# Patient Record
Sex: FEMALE | Race: WHITE | NOT HISPANIC OR LATINO | Employment: PART TIME | URBAN - METROPOLITAN AREA
[De-identification: names, ages, dates, MRNs, and addresses within clinical notes are randomized per-mention and may not be internally consistent; named-entity substitution may affect disease eponyms.]

---

## 2023-05-15 ENCOUNTER — APPOINTMENT (OUTPATIENT)
Dept: PRIMARY CARE | Facility: CLINIC | Age: 73
End: 2023-05-15
Payer: MEDICARE

## 2023-08-14 ENCOUNTER — OFFICE VISIT (OUTPATIENT)
Dept: PRIMARY CARE | Facility: CLINIC | Age: 73
End: 2023-08-14
Payer: MEDICARE

## 2023-08-14 ENCOUNTER — LAB (OUTPATIENT)
Dept: LAB | Facility: LAB | Age: 73
End: 2023-08-14
Payer: MEDICARE

## 2023-08-14 VITALS
OXYGEN SATURATION: 99 % | HEIGHT: 67 IN | WEIGHT: 124 LBS | TEMPERATURE: 97.2 F | HEART RATE: 88 BPM | BODY MASS INDEX: 19.46 KG/M2 | DIASTOLIC BLOOD PRESSURE: 81 MMHG | SYSTOLIC BLOOD PRESSURE: 159 MMHG

## 2023-08-14 DIAGNOSIS — R53.83 OTHER FATIGUE: ICD-10-CM

## 2023-08-14 DIAGNOSIS — Z13.31 SCREENING FOR DEPRESSION: ICD-10-CM

## 2023-08-14 DIAGNOSIS — Z13.31 DEPRESSION SCREEN: ICD-10-CM

## 2023-08-14 DIAGNOSIS — Z12.11 COLON CANCER SCREENING: ICD-10-CM

## 2023-08-14 DIAGNOSIS — E55.9 VITAMIN D DEFICIENCY: ICD-10-CM

## 2023-08-14 DIAGNOSIS — M81.0 AGE-RELATED OSTEOPOROSIS WITHOUT CURRENT PATHOLOGICAL FRACTURE: ICD-10-CM

## 2023-08-14 DIAGNOSIS — I10 PRIMARY HYPERTENSION: ICD-10-CM

## 2023-08-14 DIAGNOSIS — Z00.00 WELL ADULT EXAM: Primary | ICD-10-CM

## 2023-08-14 DIAGNOSIS — Z23 NEED FOR VACCINATION: ICD-10-CM

## 2023-08-14 DIAGNOSIS — R87.610 ATYPICAL SQUAMOUS CELLS OF UNDETERMINED SIGNIFICANCE ON CYTOLOGIC SMEAR OF CERVIX (ASC-US): ICD-10-CM

## 2023-08-14 DIAGNOSIS — K21.9 GASTROESOPHAGEAL REFLUX DISEASE WITHOUT ESOPHAGITIS: ICD-10-CM

## 2023-08-14 DIAGNOSIS — Z12.31 SCREENING MAMMOGRAM, ENCOUNTER FOR: ICD-10-CM

## 2023-08-14 DIAGNOSIS — Z90.5 SINGLE KIDNEY: ICD-10-CM

## 2023-08-14 PROBLEM — B94.8 POST-VIRAL DISORDER: Status: ACTIVE | Noted: 2023-08-14

## 2023-08-14 PROBLEM — B97.7: Status: ACTIVE | Noted: 2023-08-14

## 2023-08-14 PROBLEM — N94.9 VAGINAL DISCOMFORT: Status: ACTIVE | Noted: 2023-08-14

## 2023-08-14 PROBLEM — E61.1 LOW IRON: Status: RESOLVED | Noted: 2023-08-14 | Resolved: 2023-08-14

## 2023-08-14 PROBLEM — R87.620 VAGINAL PAP SMEAR WITH ASC-US: Status: ACTIVE | Noted: 2023-08-14

## 2023-08-14 PROBLEM — N95.1 MENOPAUSAL SYMPTOMS: Status: ACTIVE | Noted: 2023-08-14

## 2023-08-14 PROBLEM — E61.1 LOW IRON: Status: ACTIVE | Noted: 2023-08-14

## 2023-08-14 PROBLEM — B94.8 POST-VIRAL DISORDER: Status: RESOLVED | Noted: 2023-08-14 | Resolved: 2023-08-14

## 2023-08-14 LAB
ALANINE AMINOTRANSFERASE (SGPT) (U/L) IN SER/PLAS: 13 U/L (ref 7–45)
ALBUMIN (G/DL) IN SER/PLAS: 4.3 G/DL (ref 3.4–5)
ALKALINE PHOSPHATASE (U/L) IN SER/PLAS: 56 U/L (ref 33–136)
ANION GAP IN SER/PLAS: 14 MMOL/L (ref 10–20)
APPEARANCE, URINE: CLEAR
ASPARTATE AMINOTRANSFERASE (SGOT) (U/L) IN SER/PLAS: 17 U/L (ref 9–39)
BACTERIA, URINE: ABNORMAL /HPF
BASOPHILS (10*3/UL) IN BLOOD BY AUTOMATED COUNT: 0.04 X10E9/L (ref 0–0.1)
BASOPHILS/100 LEUKOCYTES IN BLOOD BY AUTOMATED COUNT: 0.6 % (ref 0–2)
BILIRUBIN TOTAL (MG/DL) IN SER/PLAS: 0.8 MG/DL (ref 0–1.2)
BILIRUBIN, URINE: NEGATIVE
BLOOD, URINE: NEGATIVE
CALCIDIOL (25 OH VITAMIN D3) (NG/ML) IN SER/PLAS: 48 NG/ML
CALCIUM (MG/DL) IN SER/PLAS: 9.6 MG/DL (ref 8.6–10.3)
CARBON DIOXIDE, TOTAL (MMOL/L) IN SER/PLAS: 27 MMOL/L (ref 21–32)
CHLORIDE (MMOL/L) IN SER/PLAS: 101 MMOL/L (ref 98–107)
CHOLESTEROL (MG/DL) IN SER/PLAS: 204 MG/DL (ref 0–199)
CHOLESTEROL IN HDL (MG/DL) IN SER/PLAS: 66 MG/DL
CHOLESTEROL/HDL RATIO: 3.1
COBALAMIN (VITAMIN B12) (PG/ML) IN SER/PLAS: 251 PG/ML (ref 211–911)
COLOR, URINE: ABNORMAL
CREATININE (MG/DL) IN SER/PLAS: 0.74 MG/DL (ref 0.5–1.05)
EOSINOPHILS (10*3/UL) IN BLOOD BY AUTOMATED COUNT: 0.32 X10E9/L (ref 0–0.4)
EOSINOPHILS/100 LEUKOCYTES IN BLOOD BY AUTOMATED COUNT: 4.7 % (ref 0–6)
ERYTHROCYTE DISTRIBUTION WIDTH (RATIO) BY AUTOMATED COUNT: 12.8 % (ref 11.5–14.5)
ERYTHROCYTE MEAN CORPUSCULAR HEMOGLOBIN CONCENTRATION (G/DL) BY AUTOMATED: 33.1 G/DL (ref 32–36)
ERYTHROCYTE MEAN CORPUSCULAR VOLUME (FL) BY AUTOMATED COUNT: 93 FL (ref 80–100)
ERYTHROCYTES (10*6/UL) IN BLOOD BY AUTOMATED COUNT: 4.23 X10E12/L (ref 4–5.2)
GFR FEMALE: 86 ML/MIN/1.73M2
GLUCOSE (MG/DL) IN SER/PLAS: 95 MG/DL (ref 74–99)
GLUCOSE, URINE: NEGATIVE MG/DL
HEMATOCRIT (%) IN BLOOD BY AUTOMATED COUNT: 39.3 % (ref 36–46)
HEMOGLOBIN (G/DL) IN BLOOD: 13 G/DL (ref 12–16)
IMMATURE GRANULOCYTES/100 LEUKOCYTES IN BLOOD BY AUTOMATED COUNT: 0.1 % (ref 0–0.9)
KETONES, URINE: NEGATIVE MG/DL
LDL: 117 MG/DL (ref 0–99)
LEUKOCYTE ESTERASE, URINE: ABNORMAL
LEUKOCYTES (10*3/UL) IN BLOOD BY AUTOMATED COUNT: 6.8 X10E9/L (ref 4.4–11.3)
LYMPHOCYTES (10*3/UL) IN BLOOD BY AUTOMATED COUNT: 1.79 X10E9/L (ref 0.8–3)
LYMPHOCYTES/100 LEUKOCYTES IN BLOOD BY AUTOMATED COUNT: 26.5 % (ref 13–44)
MAGNESIUM (MG/DL) IN SER/PLAS: 1.86 MG/DL (ref 1.6–2.4)
MONOCYTES (10*3/UL) IN BLOOD BY AUTOMATED COUNT: 0.55 X10E9/L (ref 0.05–0.8)
MONOCYTES/100 LEUKOCYTES IN BLOOD BY AUTOMATED COUNT: 8.1 % (ref 2–10)
NEUTROPHILS (10*3/UL) IN BLOOD BY AUTOMATED COUNT: 4.05 X10E9/L (ref 1.6–5.5)
NEUTROPHILS/100 LEUKOCYTES IN BLOOD BY AUTOMATED COUNT: 60 % (ref 40–80)
NITRITE, URINE: NEGATIVE
PH, URINE: 7 (ref 5–8)
PLATELETS (10*3/UL) IN BLOOD AUTOMATED COUNT: 228 X10E9/L (ref 150–450)
POTASSIUM (MMOL/L) IN SER/PLAS: 3.6 MMOL/L (ref 3.5–5.3)
PROTEIN TOTAL: 7.4 G/DL (ref 6.4–8.2)
PROTEIN, URINE: NEGATIVE MG/DL
RBC, URINE: ABNORMAL /HPF (ref 0–5)
SODIUM (MMOL/L) IN SER/PLAS: 138 MMOL/L (ref 136–145)
SPECIFIC GRAVITY, URINE: 1 (ref 1–1.03)
SQUAMOUS EPITHELIAL CELLS, URINE: <1 /HPF
THYROTROPIN (MIU/L) IN SER/PLAS BY DETECTION LIMIT <= 0.05 MIU/L: 3.25 MIU/L (ref 0.44–3.98)
TRIGLYCERIDE (MG/DL) IN SER/PLAS: 105 MG/DL (ref 0–149)
UREA NITROGEN (MG/DL) IN SER/PLAS: 10 MG/DL (ref 6–23)
UROBILINOGEN, URINE: <2 MG/DL (ref 0–1.9)
VLDL: 21 MG/DL (ref 0–40)
WBC, URINE: 6 /HPF (ref 0–5)

## 2023-08-14 PROCEDURE — G0446 INTENS BEHAVE THER CARDIO DX: HCPCS | Performed by: INTERNAL MEDICINE

## 2023-08-14 PROCEDURE — 1159F MED LIST DOCD IN RCRD: CPT | Performed by: INTERNAL MEDICINE

## 2023-08-14 PROCEDURE — G0439 PPPS, SUBSEQ VISIT: HCPCS | Performed by: INTERNAL MEDICINE

## 2023-08-14 PROCEDURE — 36415 COLL VENOUS BLD VENIPUNCTURE: CPT

## 2023-08-14 PROCEDURE — 80061 LIPID PANEL: CPT

## 2023-08-14 PROCEDURE — 3077F SYST BP >= 140 MM HG: CPT | Performed by: INTERNAL MEDICINE

## 2023-08-14 PROCEDURE — 90471 IMMUNIZATION ADMIN: CPT | Performed by: INTERNAL MEDICINE

## 2023-08-14 PROCEDURE — 1036F TOBACCO NON-USER: CPT | Performed by: INTERNAL MEDICINE

## 2023-08-14 PROCEDURE — 81001 URINALYSIS AUTO W/SCOPE: CPT

## 2023-08-14 PROCEDURE — 99214 OFFICE O/P EST MOD 30 MIN: CPT | Performed by: INTERNAL MEDICINE

## 2023-08-14 PROCEDURE — 1160F RVW MEDS BY RX/DR IN RCRD: CPT | Performed by: INTERNAL MEDICINE

## 2023-08-14 PROCEDURE — 1126F AMNT PAIN NOTED NONE PRSNT: CPT | Performed by: INTERNAL MEDICINE

## 2023-08-14 PROCEDURE — G0444 DEPRESSION SCREEN ANNUAL: HCPCS | Performed by: INTERNAL MEDICINE

## 2023-08-14 PROCEDURE — 90715 TDAP VACCINE 7 YRS/> IM: CPT | Performed by: INTERNAL MEDICINE

## 2023-08-14 PROCEDURE — 80053 COMPREHEN METABOLIC PANEL: CPT

## 2023-08-14 PROCEDURE — 93000 ELECTROCARDIOGRAM COMPLETE: CPT | Performed by: INTERNAL MEDICINE

## 2023-08-14 PROCEDURE — 82607 VITAMIN B-12: CPT

## 2023-08-14 PROCEDURE — 84443 ASSAY THYROID STIM HORMONE: CPT

## 2023-08-14 PROCEDURE — 83735 ASSAY OF MAGNESIUM: CPT

## 2023-08-14 PROCEDURE — 85025 COMPLETE CBC W/AUTO DIFF WBC: CPT

## 2023-08-14 PROCEDURE — 3079F DIAST BP 80-89 MM HG: CPT | Performed by: INTERNAL MEDICINE

## 2023-08-14 PROCEDURE — 82306 VITAMIN D 25 HYDROXY: CPT

## 2023-08-14 RX ORDER — CHOLECALCIFEROL (VITAMIN D3) 125 MCG
125 CAPSULE ORAL EVERY OTHER DAY
COMMUNITY
Start: 2022-02-02

## 2023-08-14 RX ORDER — ASCORBIC ACID 250 MG
250 TABLET ORAL DAILY
COMMUNITY

## 2023-08-14 RX ORDER — BUTYROSPERMUM PARKII(SHEA BUTTER), SIMMONDSIA CHINENSIS (JOJOBA) SEED OIL, ALOE BARBADENSIS LEAF EXTRACT .01; 1; 3.5 G/100G; G/100G; G/100G
250 LIQUID TOPICAL 2 TIMES DAILY
COMMUNITY
End: 2024-02-13 | Stop reason: ALTCHOICE

## 2023-08-14 RX ORDER — LISINOPRIL 2.5 MG/1
2.5 TABLET ORAL DAILY
COMMUNITY
End: 2023-08-14 | Stop reason: DRUGHIGH

## 2023-08-14 RX ORDER — LISINOPRIL 5 MG/1
5 TABLET ORAL DAILY
Qty: 30 TABLET | Refills: 5 | Status: SHIPPED | OUTPATIENT
Start: 2023-08-14 | End: 2023-09-08

## 2023-08-14 RX ORDER — LYSINE HCL 500 MG
TABLET ORAL
COMMUNITY
Start: 2018-05-04

## 2023-08-14 ASSESSMENT — PATIENT HEALTH QUESTIONNAIRE - PHQ9
SUM OF ALL RESPONSES TO PHQ9 QUESTIONS 1 AND 2: 0
1. LITTLE INTEREST OR PLEASURE IN DOING THINGS: NOT AT ALL
2. FEELING DOWN, DEPRESSED OR HOPELESS: NOT AT ALL

## 2023-08-14 NOTE — PROGRESS NOTES
Chief Complaint:   Medicare Wellness Exam/Comprehensive Problem Focused Follow Up and Physical Exam    HPI:  72 y/o F with essential HTN, GERD, osteoporosis here for Medicare Wellness. In 2021 she had a Pap smear revealing ASCUS and HPV (not type 16 or 18) and saw OB/GYN for colposcopy. Would like to return to OB/GYN for monitoring of this. Reports intermittent GERD sx for which she takes dahiana but would like another OTC recommendation. On Calcium and Vit D for osteoporosis- declined bisphosphonate at last visit.    Home BP readings: average 130s/70s-80s    Dahiana helps reflux    Social: lives with , works at a [employer] service in [place], drinks 1 glass of wine per day, former social smoking (quit 1990 or 1992) for approx 20 yrs    Exercise: corine chi once per week, daily weight lifting, sit ups and other home exercises    Diet: primary plant-based, occasional dairy    Patient Care Team:  Hailey Stern MD as PCP - General  Hailey Stern MD as PCP - [facility] [place]   Active Problem List  Patient Active Problem List   Diagnosis    Single kidney    Menopausal symptoms    Age-related osteoporosis without current pathological fracture    Essential hypertension    Vaginal discomfort    Vitamin D deficiency    Vaginal Pap smear with ASC-US    Allergic rhinitis    Bicornuate uterus    Endometriosis    Esophageal reflux    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     Comprehensive Medical/Surgical/Social/Family History  Past Medical History:   Diagnosis Date    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 08/14/2023    Hypo-osmolality and hyponatremia 02/02/2022    Low sodium levels    Iron deficiency 01/31/2022    Low iron    Low iron 08/14/2023    Other conditions influencing health status     Patient denies significant medical history    Personal history of other endocrine, nutritional and metabolic disease 01/31/2022    History of hypokalemia    Personal history of other medical  "treatment     H/O bone density study    Personal history of other medical treatment     H/O chest x-ray    Personal history of other specified conditions 05/04/2018    History of fatigue    Post-viral disorder 08/14/2023     Past Surgical History:   Procedure Laterality Date    BREAST LUMPECTOMY  05/04/2018    Breast Surgery Lumpectomy    OTHER SURGICAL HISTORY  05/04/2018    Hysteroscopy     Social History     Social History Narrative    Not on file     Tobacco/Alcohol/Opioid use, as well as Illicit Drug Use was screened for/reviewed and documented in Social Documentation section of the chart and medication list as appropriate    Allergies and Medications  Cat dander, Nickel, and Other  Current Outpatient Medications   Medication Instructions    ascorbic acid (VITAMIN C) 250 mg, oral, Daily    calcium carbonate-vit D3-min 600 mg calcium- 400 unit tablet oral    cholecalciferol (VITAMIN D-3) 125 mcg, oral, Every other day    HAWTHORN NATHAN ORAL oral    lisinopril 5 mg, oral, Daily    saccharomyces boulardii (FLORASTOR) 250 mg, oral, 2 times daily     Medications and Supplements  prescribed by me and other practitioners or clinical pharmacist (such as prescriptions, OTC's, herbal therapies and supplements) were reviewed and documented in the medical record.      Activities of Daily Living  In your present state of health, do you have any difficulty performing the following activities?:   Preparing food and eating?: No  Bathing yourself: No  Getting dressed: No  Using the toilet:No  Moving around from place to place: No  In the past year have you fallen or had a near fall?:No  Able to manage finances independently: Yes  Able to perform grocery shopping: Yes  Able to manage medications independently: Yes  Able to do housework independently: Yes  Patient self-assessment of health status? Excellent    Depression Screen  (Note: if answer to either of the following is \"Yes\", then a more complete depression screening is " indicated)   Q1: Over the past two weeks, have you felt down, depressed or hopeless? No  Q2: Over the past two weeks, have you felt little interest or pleasure in doing things? No    Current exercise habits: [employer] Chi, home exercises   Dietary issues discussed: Yes  Hearing difficulties: No  Safe in current home environment: Yes  Visual Acuity assessed: No  Cognitive Impairment No    Advance directives  Advanced Care Planning (including a Living Will, Healthcare POA, as well as specific end of life choices and/or directives), was discussed with the patient and/or surrogate, voluntarily, and documented in the Problem List of the medical record.      Cardiac Risk Assessment  Cardiovascular risk was discussed and, if needed, lifestyle modifications recommended, including nutritional choices, exercise, and elimination of habits contributing to risk. We agreed on a plan to reduce the current cardiovascular risk based on above discussion as needed.  Aspirin use/disuse was discussed and documented in the Problem List of the medical record after reviewing the updated guidelines below:     Consider low dose Aspirin ( mg) use if the benefit for cardiovascular disease prevention outweighs risk for bleeding complications.   In general, low dose ASA should be considered:  In patients WITHOUT prior MI/stroke/PAD (primary prevention):   a. Age <60: Use if 10-year cardiovascular disease risk >20%, with discussion of risks and benefits with patient  b. Age 60-<70: Use if 10-year cardiovascular disease risk >20% and low bleeding (e.g., gastrointenstinal) risk, with discussion of risks and benefits with patient  c. Age >=70: Do not use    In patients WITH prior MI/stroke/PAD (secondary prevention):   Generally use unless extremely high bleeding (e.g., gastrointenstinal) risk, with discussion of risks and benefits with patient    ROS otherwise negative aside from what was mentioned above in HPI.    Gen:  no  fever  HEENT:  no trouble swallowing  CV:  no dyspnea, cyanosis  Lungs:  no shortness of breath  GI:  no constipation, no blood in stool  Vascular:  no edema  Neuro:   no weakness  Skin:  no rash  MS:no joint swelling  Gu:  no urinary complaints  All other systems have been reviewed and are negative for complaint    Vitals  Vitals:    08/14/23 1011   BP: 159/81   Pulse: 88   Temp: 36.2 °C (97.2 °F)   SpO2: 99%     Objective   Physical Exam  General Appearance:  Alert and oriented.  NAD  HEENT:  Tm's normal , throat clear, no erythema  Lungs, CTAB  Skin:  no suspicious lesions,  warm and dry  Head :  Normocephalic  Oral Cavity; Clear mucosa moist  Neck/thyroid:  neck supple, full rom, no cervical lymphadenopathy  no thyromegaly  Heart:  RRR  no murmurs  Abdomen:  Normal , bs present, soft, nontender, not distended, no masses palpated  Extremities:  No clubbing, cyanosis, or edema  Neurologic:  Nonfocal  Psych: alert, normal mood    A/P:  72 y/o F with HTN, osteoporosis, GERD- overall doing well. With regards to her ASCUS and HPV infection- will refer to OB/GYN for monitoring. Patient declines pneumonia and shingles vaccines but accepts Tdap today. Also declines bisphosphonate therapy for her osteoporosis- will continue Vit D and Ca supplementation and repeat DEXA at next wellness visit. Also increase lisinopril to 5 mg due to elevated BP here and at home. Rest of plan as below:    Iona was seen today for medicare annual wellness visit subsequent.  Diagnoses and all orders for this visit:  Well adult exam (Primary)  Vitamin D deficiency  -     Vitamin D, Total; Future  -     Vitamin B12; Future  -     TSH with reflex to Free T4 if abnormal; Future  -     Comprehensive Metabolic Panel; Future  -     CBC and Auto Differential; Future  -     Magnesium; Future  -     Lipid Panel; Future  Age-related osteoporosis without current pathological fracture  -     Vitamin D, Total; Future  -     Vitamin B12; Future  -     TSH  with reflex to Free T4 if abnormal; Future  -     Comprehensive Metabolic Panel; Future  -     CBC and Auto Differential; Future  -     Magnesium; Future  -     Lipid Panel; Future  Single kidney  -     Vitamin D, Total; Future  -     Vitamin B12; Future  -     TSH with reflex to Free T4 if abnormal; Future  -     Comprehensive Metabolic Panel; Future  -     CBC and Auto Differential; Future  -     Urinalysis with Reflex Microscopic; Future  -     Magnesium; Future  -     Lipid Panel; Future  Other fatigue  -     TSH with reflex to Free T4 if abnormal; Future  -     Magnesium; Future  -     Lipid Panel; Future  Depression screen  -     Magnesium; Future  -     Lipid Panel; Future  Screening mammogram, encounter for  -     BI mammo bilateral screening tomosynthesis; Future  -     Magnesium; Future  -     Lipid Panel; Future  Colon cancer screening  -     Cologuard® colon cancer screening; Future  -     Cologuard® colon cancer screening  -     Magnesium; Future  -     Lipid Panel; Future  Primary hypertension  -     lisinopril 5 mg tablet; Take 1 tablet (5 mg) by mouth once daily.  -     ECG 12 lead  -     CT cardiac scoring wo IV contrast; Future  -     Magnesium; Future  -     Lipid Panel; Future  Atypical squamous cells of undetermined significance on cytologic smear of cervix (ASC-US)  -     Referral to Gynecology; Future  -     Magnesium; Future  -     Lipid Panel; Future  Need for vaccination  -     Tdap vaccine, age 10 years and older (BOOSTRIX)  -     Magnesium; Future  -     Lipid Panel; Future  Screening for depression  Gastroesophageal reflux disease without esophagitis  Comments:  use prilosec for 2 weeks otc then prn, fu if not better       During the course of the visit the patient was educated and counseled about age appropriate screening and preventive services. Completed preventive screenings were documented in the chart and orders were placed for outstanding screenings/procedures as documented in the  Assessment and Plan.    Patient Instructions (the written plan) was given to the patient at check out.    Iona was seen today for medicare annual wellness visit subsequent.  Diagnoses and all orders for this visit:  Well adult exam (Primary)  Vitamin D deficiency  -     Vitamin D, Total; Future  -     Vitamin B12; Future  -     TSH with reflex to Free T4 if abnormal; Future  -     Comprehensive Metabolic Panel; Future  -     CBC and Auto Differential; Future  -     Magnesium; Future  -     Lipid Panel; Future  Age-related osteoporosis without current pathological fracture  -     Vitamin D, Total; Future  -     Vitamin B12; Future  -     TSH with reflex to Free T4 if abnormal; Future  -     Comprehensive Metabolic Panel; Future  -     CBC and Auto Differential; Future  -     Magnesium; Future  -     Lipid Panel; Future  Single kidney  -     Vitamin D, Total; Future  -     Vitamin B12; Future  -     TSH with reflex to Free T4 if abnormal; Future  -     Comprehensive Metabolic Panel; Future  -     CBC and Auto Differential; Future  -     Urinalysis with Reflex Microscopic; Future  -     Magnesium; Future  -     Lipid Panel; Future  Other fatigue  -     TSH with reflex to Free T4 if abnormal; Future  -     Magnesium; Future  -     Lipid Panel; Future  Depression screen  -     Magnesium; Future  -     Lipid Panel; Future  Screening mammogram, encounter for  -     BI mammo bilateral screening tomosynthesis; Future  -     Magnesium; Future  -     Lipid Panel; Future  Colon cancer screening  -     Cologuard® colon cancer screening; Future  -     Cologuard® colon cancer screening  -     Magnesium; Future  -     Lipid Panel; Future  Primary hypertension  -     lisinopril 5 mg tablet; Take 1 tablet (5 mg) by mouth once daily.  -     ECG 12 lead  -     CT cardiac scoring wo IV contrast; Future  -     Magnesium; Future  -     Lipid Panel; Future  Atypical squamous cells of undetermined significance on cytologic smear of  cervix (ASC-US)  -     Referral to Gynecology; Future  -     Magnesium; Future  -     Lipid Panel; Future  Need for vaccination  -     Tdap vaccine, age 10 years and older (BOOSTRIX)  -     Magnesium; Future  -     Lipid Panel; Future  Screening for depression  Gastroesophageal reflux disease without esophagitis  Comments:  use prilosec for 2 weeks otc then prn, fu if not better    I have reviewed the residents h and p.  I agree and have edited any necessary changes.     Chronic conditions reviewed in the assessment and plan.    Continue medications unless specified otherwise.  Previous labs reviewed.      Annual Wellness exam completed   Preventive Health history reviewed:  Vaccines today: none  Labs ordered    Depression Screening done  Advanced Directives Discussion Completed  Cardiovascular risk discussed and if needed, lifestyle modifications recommended, including nutritional choices, exercise, and elimination of habits contributing to risk.  We agreed on a plan to reduce the current cardiovascular risk.  See ecalc ASCVD Risk  Plus for data discussed regarding risk and risk reduction opportunities.  Aspirin use/disuse was discussed after reviewing the updated guidelines.

## 2023-09-08 DIAGNOSIS — I10 PRIMARY HYPERTENSION: ICD-10-CM

## 2023-09-08 RX ORDER — LISINOPRIL 5 MG/1
5 TABLET ORAL DAILY
Qty: 30 TABLET | Refills: 11 | Status: SHIPPED | OUTPATIENT
Start: 2023-09-08

## 2023-10-20 NOTE — PROGRESS NOTES
Iona Hilton is a 73 y.o. G     HPI:    Last Gyn Visit:  12/9/2021 colposcopy   atrophy but otherwise nml; no Bx done  Last pap: 11/3/2021 ASCUS +HR other  Mammogram: 5/10/2021  Colonoscopy:    See above history with first abnormal Pap  Patient has a lifelong history of normal Paps  She has been in a monogamous relationship for the last 30 years  No postmenopausal bleeding  Colposcopy in follow-up was unremarkable patient is here today to  to repeat Pap  Dr. Baker had recommended local estrogen but unfortunately the patient did not use any  She does have problems with vaginal atrophy    CONSTITUTIONAL: Alert and in no acute distress. Well developed, well nourished.   HEAD AND FACE: Head and face: Normal.    EYES: Normal external exam - nonicteric sclera, extraocular movements intact (EOMI) and no ptosis.   EARS, NOSE, MOUTH, AND THROAT: External inspection of ears and nose: Normal.    NECK: No neck asymmetry. Supple.   PULMONARY: No respiratory distress.   MUSCULOSKELETAL: No joint swelling seen, normal movements of all extremities.   SKIN: Normal skin color and pigmentation, normal skin turgor, and no rash.   NEUROLOGIC: Non-focal. Grossly intact.   PSYCHIATRIC: Alert and oriented x 3. Affect normal to patient baseline. Mood: Appropriate.      Assessment and Plan:   Pap likely secondary to vaginal atrophy and not HR HPV   I offered to repeat her Pap today but think it would be more prudent for her to take estrogen for at least 3 months and then return for repeat Pap  Patient agrees and will start Vagifem as directed; return to the office in 3 to 4 months for repeat Pap

## 2023-10-24 ENCOUNTER — OFFICE VISIT (OUTPATIENT)
Dept: OBSTETRICS AND GYNECOLOGY | Facility: CLINIC | Age: 73
End: 2023-10-24
Payer: MEDICARE

## 2023-10-24 VITALS
HEIGHT: 67 IN | BODY MASS INDEX: 20.25 KG/M2 | DIASTOLIC BLOOD PRESSURE: 56 MMHG | SYSTOLIC BLOOD PRESSURE: 118 MMHG | WEIGHT: 129 LBS

## 2023-10-24 DIAGNOSIS — N95.2 VAGINAL ATROPHY: ICD-10-CM

## 2023-10-24 PROCEDURE — 1126F AMNT PAIN NOTED NONE PRSNT: CPT | Performed by: OBSTETRICS & GYNECOLOGY

## 2023-10-24 PROCEDURE — 1159F MED LIST DOCD IN RCRD: CPT | Performed by: OBSTETRICS & GYNECOLOGY

## 2023-10-24 PROCEDURE — 3074F SYST BP LT 130 MM HG: CPT | Performed by: OBSTETRICS & GYNECOLOGY

## 2023-10-24 PROCEDURE — 1160F RVW MEDS BY RX/DR IN RCRD: CPT | Performed by: OBSTETRICS & GYNECOLOGY

## 2023-10-24 PROCEDURE — 3078F DIAST BP <80 MM HG: CPT | Performed by: OBSTETRICS & GYNECOLOGY

## 2023-10-24 PROCEDURE — 99213 OFFICE O/P EST LOW 20 MIN: CPT | Performed by: OBSTETRICS & GYNECOLOGY

## 2023-10-24 PROCEDURE — 1036F TOBACCO NON-USER: CPT | Performed by: OBSTETRICS & GYNECOLOGY

## 2023-10-24 RX ORDER — ESTRADIOL 10 UG/1
10 INSERT VAGINAL NIGHTLY
Qty: 24 TABLET | Refills: 3 | Status: SHIPPED | OUTPATIENT
Start: 2023-10-24 | End: 2024-02-13

## 2023-10-24 ASSESSMENT — PAIN SCALES - GENERAL: PAINLEVEL: 0-NO PAIN

## 2023-11-10 LAB — NONINV COLON CA DNA+OCC BLD SCRN STL QL: NEGATIVE

## 2024-02-13 ENCOUNTER — LAB (OUTPATIENT)
Dept: LAB | Facility: LAB | Age: 74
End: 2024-02-13
Payer: MEDICARE

## 2024-02-13 ENCOUNTER — OFFICE VISIT (OUTPATIENT)
Dept: PRIMARY CARE | Facility: CLINIC | Age: 74
End: 2024-02-13
Payer: MEDICARE

## 2024-02-13 ENCOUNTER — HOSPITAL ENCOUNTER (OUTPATIENT)
Dept: RADIOLOGY | Facility: CLINIC | Age: 74
Discharge: HOME | End: 2024-02-13
Payer: MEDICARE

## 2024-02-13 VITALS
BODY MASS INDEX: 20.09 KG/M2 | DIASTOLIC BLOOD PRESSURE: 73 MMHG | OXYGEN SATURATION: 99 % | HEART RATE: 88 BPM | HEIGHT: 66 IN | SYSTOLIC BLOOD PRESSURE: 145 MMHG | WEIGHT: 125 LBS | TEMPERATURE: 97.7 F

## 2024-02-13 DIAGNOSIS — E55.9 VITAMIN D DEFICIENCY: ICD-10-CM

## 2024-02-13 DIAGNOSIS — R93.89 ABNORMAL CHEST X-RAY: Primary | ICD-10-CM

## 2024-02-13 DIAGNOSIS — T78.40XA ALLERGY, INITIAL ENCOUNTER: ICD-10-CM

## 2024-02-13 DIAGNOSIS — R05.9 COUGH, UNSPECIFIED TYPE: ICD-10-CM

## 2024-02-13 DIAGNOSIS — J01.00 SUBACUTE MAXILLARY SINUSITIS: Primary | ICD-10-CM

## 2024-02-13 DIAGNOSIS — J01.00 SUBACUTE MAXILLARY SINUSITIS: ICD-10-CM

## 2024-02-13 DIAGNOSIS — E53.8 VITAMIN B 12 DEFICIENCY: ICD-10-CM

## 2024-02-13 DIAGNOSIS — R93.89 ABNORMAL CHEST X-RAY: ICD-10-CM

## 2024-02-13 DIAGNOSIS — R03.0 WHITE COAT SYNDROME WITHOUT DIAGNOSIS OF HYPERTENSION: ICD-10-CM

## 2024-02-13 LAB
25(OH)D3 SERPL-MCNC: 50 NG/ML (ref 30–100)
BASOPHILS # BLD AUTO: 0.04 X10*3/UL (ref 0–0.1)
BASOPHILS NFR BLD AUTO: 0.6 %
EOSINOPHIL # BLD AUTO: 0.35 X10*3/UL (ref 0–0.4)
EOSINOPHIL NFR BLD AUTO: 5.3 %
ERYTHROCYTE [DISTWIDTH] IN BLOOD BY AUTOMATED COUNT: 13 % (ref 11.5–14.5)
HCT VFR BLD AUTO: 36.6 % (ref 36–46)
HGB BLD-MCNC: 12.1 G/DL (ref 12–16)
IMM GRANULOCYTES # BLD AUTO: 0.02 X10*3/UL (ref 0–0.5)
IMM GRANULOCYTES NFR BLD AUTO: 0.3 % (ref 0–0.9)
LYMPHOCYTES # BLD AUTO: 1.76 X10*3/UL (ref 0.8–3)
LYMPHOCYTES NFR BLD AUTO: 26.8 %
MCH RBC QN AUTO: 30.1 PG (ref 26–34)
MCHC RBC AUTO-ENTMCNC: 33.1 G/DL (ref 32–36)
MCV RBC AUTO: 91 FL (ref 80–100)
MONOCYTES # BLD AUTO: 0.68 X10*3/UL (ref 0.05–0.8)
MONOCYTES NFR BLD AUTO: 10.4 %
NEUTROPHILS # BLD AUTO: 3.71 X10*3/UL (ref 1.6–5.5)
NEUTROPHILS NFR BLD AUTO: 56.6 %
NRBC BLD-RTO: 0 /100 WBCS (ref 0–0)
PLATELET # BLD AUTO: 204 X10*3/UL (ref 150–450)
RBC # BLD AUTO: 4.02 X10*6/UL (ref 4–5.2)
VIT B12 SERPL-MCNC: 359 PG/ML (ref 211–911)
WBC # BLD AUTO: 6.6 X10*3/UL (ref 4.4–11.3)

## 2024-02-13 PROCEDURE — 99214 OFFICE O/P EST MOD 30 MIN: CPT | Performed by: INTERNAL MEDICINE

## 2024-02-13 PROCEDURE — 82785 ASSAY OF IGE: CPT

## 2024-02-13 PROCEDURE — 1126F AMNT PAIN NOTED NONE PRSNT: CPT | Performed by: INTERNAL MEDICINE

## 2024-02-13 PROCEDURE — 82607 VITAMIN B-12: CPT

## 2024-02-13 PROCEDURE — 82565 ASSAY OF CREATININE: CPT

## 2024-02-13 PROCEDURE — 71046 X-RAY EXAM CHEST 2 VIEWS: CPT | Performed by: RADIOLOGY

## 2024-02-13 PROCEDURE — 36415 COLL VENOUS BLD VENIPUNCTURE: CPT

## 2024-02-13 PROCEDURE — 3078F DIAST BP <80 MM HG: CPT | Performed by: INTERNAL MEDICINE

## 2024-02-13 PROCEDURE — 1159F MED LIST DOCD IN RCRD: CPT | Performed by: INTERNAL MEDICINE

## 2024-02-13 PROCEDURE — 1036F TOBACCO NON-USER: CPT | Performed by: INTERNAL MEDICINE

## 2024-02-13 PROCEDURE — 85025 COMPLETE CBC W/AUTO DIFF WBC: CPT

## 2024-02-13 PROCEDURE — 82306 VITAMIN D 25 HYDROXY: CPT

## 2024-02-13 PROCEDURE — 71046 X-RAY EXAM CHEST 2 VIEWS: CPT

## 2024-02-13 PROCEDURE — 3077F SYST BP >= 140 MM HG: CPT | Performed by: INTERNAL MEDICINE

## 2024-02-13 PROCEDURE — 86003 ALLG SPEC IGE CRUDE XTRC EA: CPT

## 2024-02-13 RX ORDER — AZITHROMYCIN 250 MG/1
TABLET, FILM COATED ORAL
Qty: 6 TABLET | Refills: 0 | Status: SHIPPED | OUTPATIENT
Start: 2024-02-13 | End: 2024-02-18

## 2024-02-13 ASSESSMENT — PATIENT HEALTH QUESTIONNAIRE - PHQ9
1. LITTLE INTEREST OR PLEASURE IN DOING THINGS: NOT AT ALL
SUM OF ALL RESPONSES TO PHQ9 QUESTIONS 1 AND 2: 0
2. FEELING DOWN, DEPRESSED OR HOPELESS: NOT AT ALL

## 2024-02-13 NOTE — PROGRESS NOTES
"Subjective   Patient ID: Iona Hilton is a 73 y.o. female who presents for Cough (Was ill in  and has a lingering cough and sometimes has mucous and sometimes is a dry cough/Has had a few episodes of choking while eating).  HPI   flu like sx no testing done  Cough, congestion, no fever    Fatigue until now  Chest overall better  Throat still w phlegm  No fever  Hard to swallow    Review of Systems  Gen:  no fever  HEENT:  no trouble swallowing  CV:  no dyspnea, cyanosis  Lungs:  no shortness of breath  GI:  no constipation, no blood in stool  Vascular:  no edema  Neuro:   no weakness  Skin:  no rash  MS:no joint swelling  Gu:  no urinary complaints  All other systems have been reviewed and are negative for complaint    /73   Pulse 88   Temp 36.5 °C (97.7 °F) (Temporal)   Ht 1.664 m (5' 5.5\")   Wt 56.7 kg (125 lb)   SpO2 99%   BMI 20.48 kg/m²   Objective   Physical Exam  Lab Results   Component Value Date    WBC 6.6 2024    HGB 12.1 2024    HCT 36.6 2024    MCV 91 2024     2024     Lab Results   Component Value Date    GLUCOSE 95 2023    CALCIUM 9.6 2023     2023    K 3.6 2023    CO2 27 2023     2023    BUN 10 2023    CREATININE 0.84 2024     Social History     Socioeconomic History    Marital status:      Spouse name: None    Number of children: None    Years of education: None    Highest education level: None   Occupational History    None   Tobacco Use    Smoking status: Former     Types: Cigarettes     Quit date:      Years since quittin.1    Smokeless tobacco: Never   Vaping Use    Vaping Use: Never used   Substance and Sexual Activity    Alcohol use: Yes     Comment: wine one per day    Drug use: Never    Sexual activity: Yes   Other Topics Concern    None   Social History Narrative    None     Social Determinants of Health     Financial Resource Strain: Not on file   Food " Insecurity: Not on file   Transportation Needs: Not on file   Physical Activity: Not on file   Stress: Not on file   Social Connections: Not on file   Intimate Partner Violence: Not on file   Housing Stability: Not on file     Family History   Problem Relation Name Age of Onset    Other (heart valve replct) Mother  90    Heart attack Father  47    Hypertension Sister         General:  Alert and in  NAD  Heent:  tms nl, throat clear.     Lungs, CTAB  Skin:  no suspicious lesions,  warm and dry  Head :  Normocephalic  Neck/thyroid:  neck supple, full rom, no cervical lymphadenopathy  no thyromegaly  Heart:  RRR  no murmurs  Abdomen:  Normal , bs present, soft, nontender, not distended, no masses palpated  Extremities:  No clubbing, cyanosis, or edema  Neurologic:  Nonfocal  Psych: alert, normal mood      Iona was seen today for cough.  Diagnoses and all orders for this visit:  Subacute maxillary sinusitis (Primary)  -     azithromycin (Zithromax) 250 mg tablet; Take 2 tablets (500 mg) by mouth once daily for 1 day, THEN 1 tablet (250 mg) once daily for 4 days. Take 2 tabs (500 mg) by mouth today, than 1 daily for 4 days..  -     CBC and Auto Differential; Future  -     Food Allergy Profile IgE; Future  -     Respiratory Allergy Profile IgE; Future  -     Vitamin B12; Future  -     Vitamin D 25-Hydroxy,Total (for eval of Vitamin D levels); Future  Cough, unspecified type  -     azithromycin (Zithromax) 250 mg tablet; Take 2 tablets (500 mg) by mouth once daily for 1 day, THEN 1 tablet (250 mg) once daily for 4 days. Take 2 tabs (500 mg) by mouth today, than 1 daily for 4 days..  -     XR chest 2 views; Future  -     CBC and Auto Differential; Future  -     Food Allergy Profile IgE; Future  -     Respiratory Allergy Profile IgE; Future  -     Vitamin B12; Future  -     Vitamin D 25-Hydroxy,Total (for eval of Vitamin D levels); Future  Vitamin B 12 deficiency  -     CBC and Auto Differential; Future  -     Food  Allergy Profile IgE; Future  -     Respiratory Allergy Profile IgE; Future  -     Vitamin B12; Future  -     Vitamin D 25-Hydroxy,Total (for eval of Vitamin D levels); Future  Allergy, initial encounter  -     Food Allergy Profile IgE; Future  -     Respiratory Allergy Profile IgE; Future  Vitamin D deficiency  -     Vitamin D 25-Hydroxy,Total (for eval of Vitamin D levels); Future  White coat syndrome without diagnosis of hypertension  Comments:  check bp at home  fu if numbers above 135/85    Follow up if symptoms do not resolve or worsen.

## 2024-02-14 LAB
A ALTERNATA IGE QN: 0.64 KU/L
A FUMIGATUS IGE QN: <0.1 KU/L
BERMUDA GRASS IGE QN: <0.1 KU/L
BOXELDER IGE QN: 0.1 KU/L
C HERBARUM IGE QN: <0.1 KU/L
CALIF WALNUT POLN IGE QN: 0.17 KU/L
CAT DANDER IGE QN: <0.1 KU/L
CLAM IGE QN: <0.1 KU/L
CMN PIGWEED IGE QN: 0.1 KU/L
CODFISH IGE QN: <0.1 KU/L
COMMON RAGWEED IGE QN: 0.58 KU/L
CORN IGE QN: <0.1
COTTONWOOD IGE QN: 0.18 KU/L
CREAT SERPL-MCNC: 0.84 MG/DL (ref 0.5–1.05)
D FARINAE IGE QN: <0.1 KU/L
D PTERONYSS IGE QN: <0.1 KU/L
DOG DANDER IGE QN: <0.1 KU/L
EGFRCR SERPLBLD CKD-EPI 2021: 74 ML/MIN/1.73M*2
EGG WHITE IGE QN: <0.1 KU/L
ENGL PLANTAIN IGE QN: <0.1 KU/L
GOOSEFOOT IGE QN: <0.1 KU/L
JOHNSON GRASS IGE QN: <0.1 KU/L
KENT BLUE GRASS IGE QN: 0.21 KU/L
LONDON PLANE IGE QN: 0.14 KU/L
MILK IGE QN: 0.13 KU/L
MT JUNIPER IGE QN: 0.15 KU/L
P NOTATUM IGE QN: <0.1 KU/L
PEANUT IGE QN: 0.24 KU/L
PECAN/HICK TREE IGE QN: 0.15 KU/L
ROACH IGE QN: <0.1 KU/L
SALTWORT IGE QN: 0.11 KU/L
SCALLOP IGE QN: <0.1 KU/L
SESAME SEED IGE QN: 0.24 KU/L
SHEEP SORREL IGE QN: <0.1 KU/L
SHRIMP IGE QN: <0.1 KU/L
SILVER BIRCH IGE QN: <0.1 KU/L
SOYBEAN IGE QN: <0.1 KU/L
TIMOTHY IGE QN: 0.24 KU/L
TOTAL IGE SMQN RAST: 153 KU/L
WALNUT IGE QN: 0.11 KU/L
WHEAT IGE QN: 0.16 KU/L
WHITE ASH IGE QN: 0.88 KU/L
WHITE ELM IGE QN: 0.19 KU/L
WHITE MULBERRY IGE QN: <0.1 KU/L
WHITE OAK IGE QN: <0.1 KU/L

## 2024-02-15 ENCOUNTER — TELEPHONE (OUTPATIENT)
Dept: PRIMARY CARE | Facility: CLINIC | Age: 74
End: 2024-02-15
Payer: MEDICARE

## 2024-02-15 NOTE — TELEPHONE ENCOUNTER
Patient states you wanted to know what Vitamin D3 brand with K2  [facility] solution on [employer] or [employer] time  And 5,000 IU

## 2024-02-16 DIAGNOSIS — Z91.018 MULTIPLE FOOD ALLERGIES: ICD-10-CM

## 2024-03-04 ENCOUNTER — HOSPITAL ENCOUNTER (OUTPATIENT)
Dept: RADIOLOGY | Facility: CLINIC | Age: 74
Discharge: HOME | End: 2024-03-04
Payer: MEDICARE

## 2024-03-04 DIAGNOSIS — R93.89 ABNORMAL CHEST X-RAY: ICD-10-CM

## 2024-03-04 PROCEDURE — 71260 CT THORAX DX C+: CPT

## 2024-03-04 PROCEDURE — 71260 CT THORAX DX C+: CPT | Performed by: RADIOLOGY

## 2024-03-04 PROCEDURE — 2550000001 HC RX 255 CONTRASTS: Performed by: INTERNAL MEDICINE

## 2024-03-04 RX ADMIN — IOHEXOL 50 ML: 350 INJECTION, SOLUTION INTRAVENOUS at 10:29

## 2024-03-18 DIAGNOSIS — R91.1 PULMONARY NODULE: ICD-10-CM

## 2024-03-19 ENCOUNTER — CONSULT (OUTPATIENT)
Dept: ALLERGY | Facility: CLINIC | Age: 74
End: 2024-03-19
Payer: MEDICARE

## 2024-03-19 VITALS
TEMPERATURE: 97.8 F | BODY MASS INDEX: 20.09 KG/M2 | RESPIRATION RATE: 17 BRPM | OXYGEN SATURATION: 97 % | SYSTOLIC BLOOD PRESSURE: 120 MMHG | WEIGHT: 125 LBS | HEIGHT: 66 IN | DIASTOLIC BLOOD PRESSURE: 78 MMHG | HEART RATE: 73 BPM

## 2024-03-19 DIAGNOSIS — Z91.018 MULTIPLE FOOD ALLERGIES: ICD-10-CM

## 2024-03-19 PROCEDURE — 99203 OFFICE O/P NEW LOW 30 MIN: CPT | Performed by: ALLERGY & IMMUNOLOGY

## 2024-03-19 NOTE — PATIENT INSTRUCTIONS
No food allergy-continue current diet. No need for EPI PEN.  Mild allergy to Ragweed and Alternaria mold only. Other labs equivocal so may or may not cause rhinits.  Use as needed medication.

## 2024-03-19 NOTE — PROGRESS NOTES
Patient ID: Iona Hilton is a 73 y.o. female.     Chief Complaint: NPV her for allergy evaluataion  History Of Present Illness  Iona Hilton is a 73 y.o. female with PMx allergy symptoms presenting for consultation.   Right after New year, had lingering pnd and cough.  Had CXR/CT chest and labs. Here to review labs results.  The CT of chest showed scattered pulmonary nodules < 3mm, mild atherosclerosis and mild hiatal hernia.      Food Allergy  No.  No history of food reactions and consumes a plant based diet.    Eczema/ Atopic Dermatitis  No    Asthma  No    Rhinoconjunctivitis  Intermittent  Saline daily    Drug Allergy   No    Insect Allergy   No    Infections  No history of frequent or recurrent infections      Review of Systems    Pertinent positives and negatives have been assessed in the HPI. All other systems have been reviewed and are negative except as noted in the HPI.    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    Family History  Family History   Problem Relation Name Age of Onset    Other (heart valve replct) Mother  90    Heart attack Father  47    Hypertension Sister         No history of food allergy or atopic disease in the family.    Surgical History  She has a past surgical history that includes Breast lumpectomy (05/04/2018) and Other surgical history (05/04/2018).    Social/Environmental History  She reports that she quit smoking about 32 years ago. Her smoking use  "included cigarettes. She has never used smokeless tobacco. She reports current alcohol use. She reports that she does not use drugs.    Home: Lives in a house   Floors: Wood and carpeting mixed  Air Conditioning: Central  Smoker: No  Pets: No  Infestations: No  Molds: No  Occupation: mentor/training managers    MEDICATIONS  Current Outpatient Medications on File Prior to Visit   Medication Sig Dispense Refill    ascorbic acid (Vitamin C) 250 mg tablet Take 1 tablet (250 mg) by mouth once daily.      calcium carbonate-vit D3-min 600 mg calcium- 400 unit tablet Take by mouth.      cholecalciferol (Vitamin D-3) 125 MCG (5000 UT) capsule Take 1 capsule (125 mcg) by mouth every other day.      HAWTHORN BERRY ORAL Take by mouth.      lisinopril 5 mg tablet TAKE 1 TABLET BY MOUTH EVERY DAY 30 tablet 11     No current facility-administered medications on file prior to visit.       Physical Exam  Visit Vitals  /78   Pulse 73   Temp 36.6 °C (97.8 °F) (Temporal)   Resp 17   Ht 1.664 m (5' 5.5\")   Wt 56.7 kg (125 lb)   SpO2 97%   BMI 20.48 kg/m²   OB Status Postmenopausal   Smoking Status Former   BSA 1.62 m²       Wt Readings from Last 1 Encounters:   03/19/24 56.7 kg (125 lb)       Physical Exam    General: Well appearing, no acute distress  Head: Normocephalic, atraumatic, neck supple without lymphadenopathy  Eyes: EOMI, non-injected  Ears: Tm's pale  Nose: No nasal crease, nares patent,  minimal discharge  Throat: Normal dentition, no erythema  Heart: Regular rate and rhythm  Lungs: Clear to auscultation bilaterally, effort normal  Abdomen: Soft, non-tender, normal bowel sounds  Extremities: Moves all extremities symmetrically, no edema  Skin: No rashes/lesions  Psych: normal mood and affect    LAB RESULTS:  CBC:  Recent Labs     02/13/24  1017 08/14/23  1134 01/31/22  1047   WBC 6.6 6.8 6.8   HGB 12.1 13.0 12.4   HCT 36.6 39.3 37.4    228 196   MCV 91 93 95   EOSABS 0.35 0.32 0.33       CMP:  Recent Labs     " 02/13/24  1017 08/14/23  1134 01/09/23  0937 02/09/22  1330 01/31/22  1047 01/05/22  1250 05/05/21  1134   NA  --  138  --  139 134*   < > 139   K  --  3.6  --  4.0 4.4   < > 3.7   CL  --  101  --  103 100   < > 104   CO2  --  27  --  28 29   < > 27   ANIONGAP  --  14  --  12 9*   < > 12   BUN  --  10  --  12 11   < > 11   CREATININE 0.84 0.74  --  0.68 0.71   < > 0.67   EGFR 74  --   --   --   --   --   --    MG  --  1.86 1.97  --   --   --  2.00    < > = values in this interval not displayed.     Recent Labs     08/14/23  1134 01/31/22  1047 01/05/22  1250   ALBUMIN 4.3 4.0 4.1   ALKPHOS 56 52 59   ALT 13 11 15   AST 17 14 18   BILITOT 0.8 0.7 0.5       ALLERGY:   Lab Results   Component Value Date    ICIGE 153 02/13/2024    WHITEASH 0.88 (Mod) 02/13/2024    SILVERBIRCH <0.10 02/13/2024    BOXELDER 0.10 (Equiv IgE) 02/13/2024    MOUNTJUNIPER 0.15 (Equiv IgE) 02/13/2024    COTTONWOOD 0.18 (Equiv IgE) 02/13/2024    ELM 0.19 (Equiv IgE) 02/13/2024    MULBERRY <0.10 02/13/2024    PECANHICKORY 0.15 (Equiv IgE) 02/13/2024    MAPLESYCAMOR 0.14 (Equiv IgE) 02/13/2024    OAK <0.10 02/13/2024    BERMUDAGR <0.10 02/13/2024    JOHNSONGR <0.10 02/13/2024    BLUEGRASS 0.21 (Equiv IgE) 02/13/2024    TIMOTHYGRASS 0.24 (Equiv IgE) 02/13/2024     Lab Results   Component Value Date    LAMBQUART <0.10 02/13/2024    PIGWEED 0.10 (Equiv IgE) 02/13/2024    COMRAGWEED 0.58 (Low) 02/13/2024    RUSSIANT 0.11 (Equiv IgE) 02/13/2024    SHEEPSOR <0.10 02/13/2024    PLANTAIN <0.10 02/13/2024    CATEPI <0.10 02/13/2024    DOGEPI <0.10 02/13/2024    ALTERNA 0.64 (Low) 02/13/2024    CLADHERB <0.10 02/13/2024    ICA04 <0.10 02/13/2024    PENICILLIUM <0.10 02/13/2024    DERMFAR <0.10 02/13/2024    DERMPTE <0.10 02/13/2024    COCKR <0.10 02/13/2024     Lab Results   Component Value Date    PEANUT 0.24 (Equiv IgE) 02/13/2024    WALNUT 0.11 (Equiv IgE) 02/13/2024    WALNUT 0.17 (Equiv IgE) 02/13/2024    SHRIMP <0.10 02/13/2024    CLAM <0.10 02/13/2024     SCALLOP <0.10 02/13/2024       Recent Labs     02/13/24  1017   ICIGE 153     Recent Labs     02/13/24  1017 08/14/23  1134 01/31/22  1047   EOSABS 0.35 0.32 0.33       HEME/ENDO:  Recent Labs     08/14/23  1134 01/31/22  1047 08/24/21  1738 04/08/21  1515   TSH 3.25 2.19  --  2.11   FERRITIN  --  72 75 98   IRONSAT  --  42 21* 10*         Assessment/Plan   Assessment:  Low positive immunoCAP for rosalio tree and Alternaria mold reviewed. Discussed avoidance and medications.  Remainder of immunoCAP for food and environmental triggers are negative or equivocal and not considered significant.  Cough has now resolved s/p illness.  Small nodules on lung scan.  Plan:  Rosalio tree pollen and Alternaria avoidance with as needed medication  Resume regular diet without concern for food allergy  Continued general monitoring based on risk factors for pulmonary nodules        Carlene Cano DO

## 2024-05-28 ENCOUNTER — APPOINTMENT (OUTPATIENT)
Dept: ALLERGY | Facility: CLINIC | Age: 74
End: 2024-05-28
Payer: MEDICARE

## 2024-08-19 ENCOUNTER — APPOINTMENT (OUTPATIENT)
Dept: PRIMARY CARE | Facility: CLINIC | Age: 74
End: 2024-08-19
Payer: MEDICARE

## 2024-08-29 DIAGNOSIS — Z12.31 ENCOUNTER FOR SCREENING MAMMOGRAM FOR BREAST CANCER: ICD-10-CM

## 2024-09-12 DIAGNOSIS — I10 PRIMARY HYPERTENSION: ICD-10-CM

## 2024-09-12 RX ORDER — LISINOPRIL 5 MG/1
5 TABLET ORAL DAILY
Qty: 90 TABLET | Refills: 1 | Status: SHIPPED | OUTPATIENT
Start: 2024-09-12

## 2024-10-22 ENCOUNTER — APPOINTMENT (OUTPATIENT)
Dept: CARDIOLOGY | Facility: HOSPITAL | Age: 74
DRG: 522 | End: 2024-10-22
Payer: MEDICARE

## 2024-10-22 ENCOUNTER — APPOINTMENT (OUTPATIENT)
Dept: RADIOLOGY | Facility: HOSPITAL | Age: 74
DRG: 522 | End: 2024-10-22
Payer: MEDICARE

## 2024-10-22 ENCOUNTER — HOSPITAL ENCOUNTER (INPATIENT)
Facility: HOSPITAL | Age: 74
DRG: 522 | End: 2024-10-22
Attending: EMERGENCY MEDICINE | Admitting: STUDENT IN AN ORGANIZED HEALTH CARE EDUCATION/TRAINING PROGRAM
Payer: MEDICARE

## 2024-10-22 DIAGNOSIS — S72.001A CLOSED FRACTURE OF NECK OF RIGHT FEMUR, INITIAL ENCOUNTER: Primary | ICD-10-CM

## 2024-10-22 DIAGNOSIS — S72.001A CLOSED DISPLACED FRACTURE OF RIGHT FEMORAL NECK: ICD-10-CM

## 2024-10-22 LAB
ALBUMIN SERPL BCP-MCNC: 3.9 G/DL (ref 3.4–5)
ALP SERPL-CCNC: 48 U/L (ref 33–136)
ALT SERPL W P-5'-P-CCNC: 13 U/L (ref 7–45)
ANION GAP SERPL CALC-SCNC: 13 MMOL/L (ref 10–20)
AST SERPL W P-5'-P-CCNC: 13 U/L (ref 9–39)
BASOPHILS # BLD AUTO: 0.02 X10*3/UL (ref 0–0.1)
BASOPHILS NFR BLD AUTO: 0.2 %
BILIRUB SERPL-MCNC: 0.3 MG/DL (ref 0–1.2)
BUN SERPL-MCNC: 18 MG/DL (ref 6–23)
CALCIUM SERPL-MCNC: 9 MG/DL (ref 8.6–10.3)
CHLORIDE SERPL-SCNC: 103 MMOL/L (ref 98–107)
CO2 SERPL-SCNC: 25 MMOL/L (ref 21–32)
CREAT SERPL-MCNC: 0.76 MG/DL (ref 0.5–1.05)
EGFRCR SERPLBLD CKD-EPI 2021: 83 ML/MIN/1.73M*2
EOSINOPHIL # BLD AUTO: 0.21 X10*3/UL (ref 0–0.4)
EOSINOPHIL NFR BLD AUTO: 1.7 %
ERYTHROCYTE [DISTWIDTH] IN BLOOD BY AUTOMATED COUNT: 12.9 % (ref 11.5–14.5)
GLUCOSE SERPL-MCNC: 141 MG/DL (ref 74–99)
HCT VFR BLD AUTO: 33 % (ref 36–46)
HGB BLD-MCNC: 11.3 G/DL (ref 12–16)
IMM GRANULOCYTES # BLD AUTO: 0.04 X10*3/UL (ref 0–0.5)
IMM GRANULOCYTES NFR BLD AUTO: 0.3 % (ref 0–0.9)
LYMPHOCYTES # BLD AUTO: 1.59 X10*3/UL (ref 0.8–3)
LYMPHOCYTES NFR BLD AUTO: 13.2 %
MCH RBC QN AUTO: 31.4 PG (ref 26–34)
MCHC RBC AUTO-ENTMCNC: 34.2 G/DL (ref 32–36)
MCV RBC AUTO: 92 FL (ref 80–100)
MONOCYTES # BLD AUTO: 1 X10*3/UL (ref 0.05–0.8)
MONOCYTES NFR BLD AUTO: 8.3 %
NEUTROPHILS # BLD AUTO: 9.17 X10*3/UL (ref 1.6–5.5)
NEUTROPHILS NFR BLD AUTO: 76.3 %
NRBC BLD-RTO: 0 /100 WBCS (ref 0–0)
PLATELET # BLD AUTO: 205 X10*3/UL (ref 150–450)
POTASSIUM SERPL-SCNC: 3.9 MMOL/L (ref 3.5–5.3)
PROT SERPL-MCNC: 6.5 G/DL (ref 6.4–8.2)
RBC # BLD AUTO: 3.6 X10*6/UL (ref 4–5.2)
SODIUM SERPL-SCNC: 137 MMOL/L (ref 136–145)
WBC # BLD AUTO: 12 X10*3/UL (ref 4.4–11.3)

## 2024-10-22 PROCEDURE — 85025 COMPLETE CBC W/AUTO DIFF WBC: CPT | Performed by: EMERGENCY MEDICINE

## 2024-10-22 PROCEDURE — 71045 X-RAY EXAM CHEST 1 VIEW: CPT

## 2024-10-22 PROCEDURE — 93005 ELECTROCARDIOGRAM TRACING: CPT

## 2024-10-22 PROCEDURE — 99285 EMERGENCY DEPT VISIT HI MDM: CPT

## 2024-10-22 PROCEDURE — 73502 X-RAY EXAM HIP UNI 2-3 VIEWS: CPT | Mod: RT

## 2024-10-22 PROCEDURE — 73700 CT LOWER EXTREMITY W/O DYE: CPT | Mod: RT

## 2024-10-22 PROCEDURE — 73502 X-RAY EXAM HIP UNI 2-3 VIEWS: CPT | Mod: RIGHT SIDE | Performed by: RADIOLOGY

## 2024-10-22 PROCEDURE — 36415 COLL VENOUS BLD VENIPUNCTURE: CPT | Performed by: EMERGENCY MEDICINE

## 2024-10-22 PROCEDURE — 80053 COMPREHEN METABOLIC PANEL: CPT | Performed by: EMERGENCY MEDICINE

## 2024-10-22 PROCEDURE — 99285 EMERGENCY DEPT VISIT HI MDM: CPT | Performed by: EMERGENCY MEDICINE

## 2024-10-22 ASSESSMENT — LIFESTYLE VARIABLES
EVER FELT BAD OR GUILTY ABOUT YOUR DRINKING: NO
HAVE PEOPLE ANNOYED YOU BY CRITICIZING YOUR DRINKING: NO
EVER HAD A DRINK FIRST THING IN THE MORNING TO STEADY YOUR NERVES TO GET RID OF A HANGOVER: NO
TOTAL SCORE: 0
HAVE YOU EVER FELT YOU SHOULD CUT DOWN ON YOUR DRINKING: NO

## 2024-10-22 ASSESSMENT — COLUMBIA-SUICIDE SEVERITY RATING SCALE - C-SSRS
2. HAVE YOU ACTUALLY HAD ANY THOUGHTS OF KILLING YOURSELF?: NO
1. IN THE PAST MONTH, HAVE YOU WISHED YOU WERE DEAD OR WISHED YOU COULD GO TO SLEEP AND NOT WAKE UP?: NO
6. HAVE YOU EVER DONE ANYTHING, STARTED TO DO ANYTHING, OR PREPARED TO DO ANYTHING TO END YOUR LIFE?: NO

## 2024-10-22 ASSESSMENT — PAIN - FUNCTIONAL ASSESSMENT: PAIN_FUNCTIONAL_ASSESSMENT: 0-10

## 2024-10-22 ASSESSMENT — PAIN SCALES - GENERAL: PAINLEVEL_OUTOF10: 6

## 2024-10-22 ASSESSMENT — PAIN DESCRIPTION - ORIENTATION: ORIENTATION: RIGHT

## 2024-10-22 ASSESSMENT — PAIN DESCRIPTION - LOCATION: LOCATION: HIP

## 2024-10-22 ASSESSMENT — PAIN DESCRIPTION - DESCRIPTORS: DESCRIPTORS: THROBBING

## 2024-10-23 ENCOUNTER — ANESTHESIA EVENT (OUTPATIENT)
Dept: OPERATING ROOM | Facility: HOSPITAL | Age: 74
End: 2024-10-23
Payer: MEDICARE

## 2024-10-23 ENCOUNTER — APPOINTMENT (OUTPATIENT)
Dept: RADIOLOGY | Facility: HOSPITAL | Age: 74
DRG: 522 | End: 2024-10-23
Payer: MEDICARE

## 2024-10-23 ENCOUNTER — ANESTHESIA (OUTPATIENT)
Dept: OPERATING ROOM | Facility: HOSPITAL | Age: 74
End: 2024-10-23
Payer: MEDICARE

## 2024-10-23 PROBLEM — N30.00 ACUTE CYSTITIS WITHOUT HEMATURIA: Status: ACTIVE | Noted: 2024-10-23

## 2024-10-23 PROBLEM — S72.001A CLOSED FRACTURE OF NECK OF RIGHT FEMUR: Status: RESOLVED | Noted: 2024-10-22 | Resolved: 2024-10-23

## 2024-10-23 PROBLEM — D72.828 NEUTROPHILIC LEUKOCYTOSIS: Status: ACTIVE | Noted: 2024-10-23

## 2024-10-23 PROBLEM — S72.001A CLOSED FRACTURE OF NECK OF RIGHT FEMUR, INITIAL ENCOUNTER: Status: ACTIVE | Noted: 2024-10-23

## 2024-10-23 PROBLEM — S72.001A CLOSED DISPLACED FRACTURE OF RIGHT FEMORAL NECK: Status: RESOLVED | Noted: 2024-10-22 | Resolved: 2024-10-23

## 2024-10-23 LAB
ABO GROUP (TYPE) IN BLOOD: NORMAL
ANION GAP SERPL CALC-SCNC: 12 MMOL/L (ref 10–20)
ANTIBODY SCREEN: NORMAL
APPEARANCE UR: CLEAR
APTT PPP: 28 SECONDS (ref 27–38)
ATRIAL RATE: 95 BPM
BACTERIA #/AREA URNS AUTO: ABNORMAL /HPF
BILIRUB UR STRIP.AUTO-MCNC: NEGATIVE MG/DL
BUN SERPL-MCNC: 14 MG/DL (ref 6–23)
CALCIUM SERPL-MCNC: 8.8 MG/DL (ref 8.6–10.3)
CHLORIDE SERPL-SCNC: 107 MMOL/L (ref 98–107)
CO2 SERPL-SCNC: 24 MMOL/L (ref 21–32)
COLOR UR: YELLOW
CREAT SERPL-MCNC: 0.59 MG/DL (ref 0.5–1.05)
EGFRCR SERPLBLD CKD-EPI 2021: >90 ML/MIN/1.73M*2
ERYTHROCYTE [DISTWIDTH] IN BLOOD BY AUTOMATED COUNT: 12.9 % (ref 11.5–14.5)
GLUCOSE SERPL-MCNC: 125 MG/DL (ref 74–99)
GLUCOSE UR STRIP.AUTO-MCNC: NORMAL MG/DL
HCT VFR BLD AUTO: 32.9 % (ref 36–46)
HGB BLD-MCNC: 11.2 G/DL (ref 12–16)
HOLD SPECIMEN: NORMAL
INR PPP: 1 (ref 0.9–1.1)
KETONES UR STRIP.AUTO-MCNC: ABNORMAL MG/DL
LEUKOCYTE ESTERASE UR QL STRIP.AUTO: ABNORMAL
MAGNESIUM SERPL-MCNC: 1.88 MG/DL (ref 1.6–2.4)
MCH RBC QN AUTO: 30.6 PG (ref 26–34)
MCHC RBC AUTO-ENTMCNC: 34 G/DL (ref 32–36)
MCV RBC AUTO: 90 FL (ref 80–100)
MUCOUS THREADS #/AREA URNS AUTO: ABNORMAL /LPF
NITRITE UR QL STRIP.AUTO: ABNORMAL
NRBC BLD-RTO: 0 /100 WBCS (ref 0–0)
P AXIS: 74 DEGREES
P OFFSET: 190 MS
P ONSET: 141 MS
PH UR STRIP.AUTO: 6 [PH]
PHOSPHATE SERPL-MCNC: 3.6 MG/DL (ref 2.5–4.9)
PLATELET # BLD AUTO: 201 X10*3/UL (ref 150–450)
POTASSIUM SERPL-SCNC: 4.2 MMOL/L (ref 3.5–5.3)
PR INTERVAL: 150 MS
PROT UR STRIP.AUTO-MCNC: NEGATIVE MG/DL
PROTHROMBIN TIME: 11.1 SECONDS (ref 9.8–12.8)
Q ONSET: 216 MS
QRS COUNT: 16 BEATS
QRS DURATION: 86 MS
QT INTERVAL: 374 MS
QTC CALCULATION(BAZETT): 469 MS
QTC FREDERICIA: 435 MS
R AXIS: 84 DEGREES
RBC # BLD AUTO: 3.66 X10*6/UL (ref 4–5.2)
RBC # UR STRIP.AUTO: NEGATIVE /UL
RBC #/AREA URNS AUTO: ABNORMAL /HPF
RH FACTOR (ANTIGEN D): NORMAL
SODIUM SERPL-SCNC: 139 MMOL/L (ref 136–145)
SP GR UR STRIP.AUTO: 1.02
T AXIS: 27 DEGREES
T OFFSET: 403 MS
UROBILINOGEN UR STRIP.AUTO-MCNC: NORMAL MG/DL
VENTRICULAR RATE: 95 BPM
WBC # BLD AUTO: 11.9 X10*3/UL (ref 4.4–11.3)
WBC #/AREA URNS AUTO: ABNORMAL /HPF

## 2024-10-23 PROCEDURE — 85610 PROTHROMBIN TIME: CPT

## 2024-10-23 PROCEDURE — 99100 ANES PT EXTEME AGE<1 YR&>70: CPT | Performed by: ANESTHESIOLOGY

## 2024-10-23 PROCEDURE — 2780000003 HC OR 278 NO HCPCS: Performed by: ORTHOPAEDIC SURGERY

## 2024-10-23 PROCEDURE — A27236 PR FEMORAL FX, OPEN TX: Performed by: NURSE ANESTHETIST, CERTIFIED REGISTERED

## 2024-10-23 PROCEDURE — 27236 TREAT THIGH FRACTURE: CPT | Performed by: PHYSICIAN ASSISTANT

## 2024-10-23 PROCEDURE — 83735 ASSAY OF MAGNESIUM: CPT

## 2024-10-23 PROCEDURE — 72170 X-RAY EXAM OF PELVIS: CPT | Performed by: RADIOLOGY

## 2024-10-23 PROCEDURE — 2500000004 HC RX 250 GENERAL PHARMACY W/ HCPCS (ALT 636 FOR OP/ED): Mod: JZ | Performed by: PHYSICIAN ASSISTANT

## 2024-10-23 PROCEDURE — 1100000001 HC PRIVATE ROOM DAILY

## 2024-10-23 PROCEDURE — C1776 JOINT DEVICE (IMPLANTABLE): HCPCS | Performed by: ORTHOPAEDIC SURGERY

## 2024-10-23 PROCEDURE — 36415 COLL VENOUS BLD VENIPUNCTURE: CPT

## 2024-10-23 PROCEDURE — 3600000010 HC OR TIME - EACH INCREMENTAL 1 MINUTE - PROCEDURE LEVEL FIVE: Performed by: ORTHOPAEDIC SURGERY

## 2024-10-23 PROCEDURE — 2720000007 HC OR 272 NO HCPCS: Performed by: ORTHOPAEDIC SURGERY

## 2024-10-23 PROCEDURE — 7100000002 HC RECOVERY ROOM TIME - EACH INCREMENTAL 1 MINUTE: Performed by: ORTHOPAEDIC SURGERY

## 2024-10-23 PROCEDURE — 81001 URINALYSIS AUTO W/SCOPE: CPT

## 2024-10-23 PROCEDURE — 2500000005 HC RX 250 GENERAL PHARMACY W/O HCPCS: Performed by: ANESTHESIOLOGY

## 2024-10-23 PROCEDURE — A6213 FOAM DRG >16<=48 SQ IN W/BDR: HCPCS | Performed by: ORTHOPAEDIC SURGERY

## 2024-10-23 PROCEDURE — 2500000004 HC RX 250 GENERAL PHARMACY W/ HCPCS (ALT 636 FOR OP/ED): Mod: JZ | Performed by: NURSE PRACTITIONER

## 2024-10-23 PROCEDURE — 2500000004 HC RX 250 GENERAL PHARMACY W/ HCPCS (ALT 636 FOR OP/ED): Performed by: NURSE ANESTHETIST, CERTIFIED REGISTERED

## 2024-10-23 PROCEDURE — 82374 ASSAY BLOOD CARBON DIOXIDE: CPT

## 2024-10-23 PROCEDURE — 2500000005 HC RX 250 GENERAL PHARMACY W/O HCPCS

## 2024-10-23 PROCEDURE — 85027 COMPLETE CBC AUTOMATED: CPT

## 2024-10-23 PROCEDURE — 86900 BLOOD TYPING SEROLOGIC ABO: CPT

## 2024-10-23 PROCEDURE — 27236 TREAT THIGH FRACTURE: CPT | Performed by: ORTHOPAEDIC SURGERY

## 2024-10-23 PROCEDURE — A27236 PR FEMORAL FX, OPEN TX: Performed by: ANESTHESIOLOGY

## 2024-10-23 PROCEDURE — 3600000005 HC OR TIME - INITIAL BASE CHARGE - PROCEDURE LEVEL FIVE: Performed by: ORTHOPAEDIC SURGERY

## 2024-10-23 PROCEDURE — 3700000001 HC GENERAL ANESTHESIA TIME - INITIAL BASE CHARGE: Performed by: ORTHOPAEDIC SURGERY

## 2024-10-23 PROCEDURE — 99223 1ST HOSP IP/OBS HIGH 75: CPT | Performed by: ORTHOPAEDIC SURGERY

## 2024-10-23 PROCEDURE — 84100 ASSAY OF PHOSPHORUS: CPT

## 2024-10-23 PROCEDURE — 3700000002 HC GENERAL ANESTHESIA TIME - EACH INCREMENTAL 1 MINUTE: Performed by: ORTHOPAEDIC SURGERY

## 2024-10-23 PROCEDURE — 99221 1ST HOSP IP/OBS SF/LOW 40: CPT

## 2024-10-23 PROCEDURE — 72170 X-RAY EXAM OF PELVIS: CPT

## 2024-10-23 PROCEDURE — 7100000001 HC RECOVERY ROOM TIME - INITIAL BASE CHARGE: Performed by: ORTHOPAEDIC SURGERY

## 2024-10-23 PROCEDURE — 87186 SC STD MICRODIL/AGAR DIL: CPT | Mod: STJLAB

## 2024-10-23 PROCEDURE — 2500000001 HC RX 250 WO HCPCS SELF ADMINISTERED DRUGS (ALT 637 FOR MEDICARE OP): Performed by: PHYSICIAN ASSISTANT

## 2024-10-23 PROCEDURE — 2500000001 HC RX 250 WO HCPCS SELF ADMINISTERED DRUGS (ALT 637 FOR MEDICARE OP)

## 2024-10-23 PROCEDURE — 2500000001 HC RX 250 WO HCPCS SELF ADMINISTERED DRUGS (ALT 637 FOR MEDICARE OP): Performed by: NURSE PRACTITIONER

## 2024-10-23 PROCEDURE — P9045 ALBUMIN (HUMAN), 5%, 250 ML: HCPCS | Mod: JZ | Performed by: NURSE ANESTHETIST, CERTIFIED REGISTERED

## 2024-10-23 PROCEDURE — 99222 1ST HOSP IP/OBS MODERATE 55: CPT

## 2024-10-23 PROCEDURE — 2500000004 HC RX 250 GENERAL PHARMACY W/ HCPCS (ALT 636 FOR OP/ED)

## 2024-10-23 PROCEDURE — 2500000004 HC RX 250 GENERAL PHARMACY W/ HCPCS (ALT 636 FOR OP/ED): Performed by: ANESTHESIOLOGY

## 2024-10-23 DEVICE — 132 DEGREE CEMENTED HIP STEM
Type: IMPLANTABLE DEVICE | Site: HIP | Status: FUNCTIONAL
Brand: ACCOLADE

## 2024-10-23 DEVICE — DISTAL SPACER
Type: IMPLANTABLE DEVICE | Site: HIP | Status: FUNCTIONAL
Brand: ACCOLADE

## 2024-10-23 DEVICE — V40 FEMORAL HEAD
Type: IMPLANTABLE DEVICE | Site: HIP | Status: FUNCTIONAL
Brand: V40 HEAD

## 2024-10-23 DEVICE — SIMPLEX® HV IS A FAST-SETTING ACRYLIC RESIN FOR USE IN BONE SURGERY. MIXING THE TWO SEPARATE STERILE COMPONENTS PRODUCES A DUCTILE BONE CEMENT WHICH, AFTER HARDENING, FIXES THE IMPLANT AND TRANSFERS STRESSES PRODUCED DURING MOVEMENT EVENLY TO THE BONE. SIMPLEX® HV CEMENT POWDER ALSO CONTAINS INSOLUBLE ZIRCONIUM DIOXIDE AS AN X-RAY CONTRAST MEDIUM. SIMPLEX® HV DOES NOT EMIT A SIGNAL AND DOES NOT POSE A SAFETY RISK IN A MAGNETIC RESONANCE ENVIRONMENT.
Type: IMPLANTABLE DEVICE | Site: HIP | Status: FUNCTIONAL
Brand: SIMPLEX HV

## 2024-10-23 DEVICE — BONE CEMENT, PREP KIT, FEMORAL: Type: IMPLANTABLE DEVICE | Site: HIP | Status: FUNCTIONAL

## 2024-10-23 DEVICE — BIPOLAR COMPONENT
Type: IMPLANTABLE DEVICE | Site: HIP | Status: FUNCTIONAL
Brand: UHR

## 2024-10-23 RX ORDER — ENOXAPARIN SODIUM 100 MG/ML
40 INJECTION SUBCUTANEOUS DAILY
Status: DISCONTINUED | OUTPATIENT
Start: 2024-10-23 | End: 2024-10-28 | Stop reason: HOSPADM

## 2024-10-23 RX ORDER — ALBUMIN HUMAN 50 G/1000ML
SOLUTION INTRAVENOUS AS NEEDED
Status: DISCONTINUED | OUTPATIENT
Start: 2024-10-23 | End: 2024-10-23

## 2024-10-23 RX ORDER — TALC
3 POWDER (GRAM) TOPICAL NIGHTLY PRN
Status: DISCONTINUED | OUTPATIENT
Start: 2024-10-23 | End: 2024-10-23

## 2024-10-23 RX ORDER — MORPHINE SULFATE 2 MG/ML
2 INJECTION, SOLUTION INTRAMUSCULAR; INTRAVENOUS EVERY 2 HOUR PRN
Status: DISCONTINUED | OUTPATIENT
Start: 2024-10-23 | End: 2024-10-28 | Stop reason: HOSPADM

## 2024-10-23 RX ORDER — ONDANSETRON HYDROCHLORIDE 2 MG/ML
4 INJECTION, SOLUTION INTRAVENOUS EVERY 8 HOURS PRN
Status: DISCONTINUED | OUTPATIENT
Start: 2024-10-23 | End: 2024-10-23

## 2024-10-23 RX ORDER — LIDOCAINE HYDROCHLORIDE 20 MG/ML
INJECTION, SOLUTION INFILTRATION; PERINEURAL AS NEEDED
Status: DISCONTINUED | OUTPATIENT
Start: 2024-10-23 | End: 2024-10-23

## 2024-10-23 RX ORDER — CEFAZOLIN 1 G/1
INJECTION, POWDER, FOR SOLUTION INTRAVENOUS AS NEEDED
Status: DISCONTINUED | OUTPATIENT
Start: 2024-10-23 | End: 2024-10-23

## 2024-10-23 RX ORDER — FENTANYL CITRATE 50 UG/ML
12.5 INJECTION, SOLUTION INTRAMUSCULAR; INTRAVENOUS EVERY 5 MIN PRN
Status: DISCONTINUED | OUTPATIENT
Start: 2024-10-23 | End: 2024-10-23 | Stop reason: HOSPADM

## 2024-10-23 RX ORDER — METOCLOPRAMIDE 10 MG/1
10 TABLET ORAL EVERY 6 HOURS PRN
Status: DISCONTINUED | OUTPATIENT
Start: 2024-10-23 | End: 2024-10-28 | Stop reason: HOSPADM

## 2024-10-23 RX ORDER — PHENYLEPHRINE HCL IN 0.9% NACL 1 MG/10 ML
SYRINGE (ML) INTRAVENOUS AS NEEDED
Status: DISCONTINUED | OUTPATIENT
Start: 2024-10-23 | End: 2024-10-23

## 2024-10-23 RX ORDER — ROCURONIUM BROMIDE 10 MG/ML
INJECTION, SOLUTION INTRAVENOUS AS NEEDED
Status: DISCONTINUED | OUTPATIENT
Start: 2024-10-23 | End: 2024-10-23

## 2024-10-23 RX ORDER — BISACODYL 10 MG/1
10 SUPPOSITORY RECTAL DAILY PRN
Status: CANCELLED | OUTPATIENT
Start: 2024-10-23

## 2024-10-23 RX ORDER — ACETAMINOPHEN 325 MG/1
650 TABLET ORAL EVERY 4 HOURS PRN
Status: DISCONTINUED | OUTPATIENT
Start: 2024-10-23 | End: 2024-10-28 | Stop reason: HOSPADM

## 2024-10-23 RX ORDER — SODIUM CHLORIDE, SODIUM LACTATE, POTASSIUM CHLORIDE, CALCIUM CHLORIDE 600; 310; 30; 20 MG/100ML; MG/100ML; MG/100ML; MG/100ML
50 INJECTION, SOLUTION INTRAVENOUS CONTINUOUS
Status: ACTIVE | OUTPATIENT
Start: 2024-10-23 | End: 2024-10-24

## 2024-10-23 RX ORDER — SODIUM CHLORIDE 9 MG/ML
100 INJECTION, SOLUTION INTRAVENOUS CONTINUOUS
Status: ACTIVE | OUTPATIENT
Start: 2024-10-23 | End: 2024-10-24

## 2024-10-23 RX ORDER — CEFAZOLIN SODIUM 2 G/100ML
2 INJECTION, SOLUTION INTRAVENOUS ONCE
Status: COMPLETED | OUTPATIENT
Start: 2024-10-23 | End: 2024-10-23

## 2024-10-23 RX ORDER — BISACODYL 10 MG/1
10 SUPPOSITORY RECTAL DAILY PRN
Status: DISCONTINUED | OUTPATIENT
Start: 2024-10-23 | End: 2024-10-28 | Stop reason: HOSPADM

## 2024-10-23 RX ORDER — LIDOCAINE 560 MG/1
2 PATCH PERCUTANEOUS; TOPICAL; TRANSDERMAL DAILY
Status: DISCONTINUED | OUTPATIENT
Start: 2024-10-23 | End: 2024-10-28 | Stop reason: HOSPADM

## 2024-10-23 RX ORDER — MIDAZOLAM HYDROCHLORIDE 1 MG/ML
1 INJECTION, SOLUTION INTRAMUSCULAR; INTRAVENOUS ONCE AS NEEDED
Status: DISCONTINUED | OUTPATIENT
Start: 2024-10-23 | End: 2024-10-23 | Stop reason: HOSPADM

## 2024-10-23 RX ORDER — METOCLOPRAMIDE HYDROCHLORIDE 5 MG/ML
10 INJECTION INTRAMUSCULAR; INTRAVENOUS EVERY 6 HOURS PRN
Status: DISCONTINUED | OUTPATIENT
Start: 2024-10-23 | End: 2024-10-28 | Stop reason: HOSPADM

## 2024-10-23 RX ORDER — HYDROMORPHONE HYDROCHLORIDE 1 MG/ML
INJECTION, SOLUTION INTRAMUSCULAR; INTRAVENOUS; SUBCUTANEOUS AS NEEDED
Status: DISCONTINUED | OUTPATIENT
Start: 2024-10-23 | End: 2024-10-23

## 2024-10-23 RX ORDER — HYDRALAZINE HYDROCHLORIDE 20 MG/ML
5 INJECTION INTRAMUSCULAR; INTRAVENOUS EVERY 30 MIN PRN
Status: DISCONTINUED | OUTPATIENT
Start: 2024-10-23 | End: 2024-10-23 | Stop reason: HOSPADM

## 2024-10-23 RX ORDER — OXYCODONE HYDROCHLORIDE 5 MG/1
5 TABLET ORAL EVERY 6 HOURS PRN
Status: DISCONTINUED | OUTPATIENT
Start: 2024-10-23 | End: 2024-10-24

## 2024-10-23 RX ORDER — BISACODYL 5 MG
10 TABLET, DELAYED RELEASE (ENTERIC COATED) ORAL DAILY PRN
Status: DISCONTINUED | OUTPATIENT
Start: 2024-10-23 | End: 2024-10-28 | Stop reason: HOSPADM

## 2024-10-23 RX ORDER — POLYETHYLENE GLYCOL 3350 17 G/17G
17 POWDER, FOR SOLUTION ORAL DAILY
Status: CANCELLED | OUTPATIENT
Start: 2024-10-23

## 2024-10-23 RX ORDER — OXYCODONE HYDROCHLORIDE 10 MG/1
10 TABLET ORAL EVERY 4 HOURS PRN
Status: DISCONTINUED | OUTPATIENT
Start: 2024-10-23 | End: 2024-10-24

## 2024-10-23 RX ORDER — CEFTRIAXONE 1 G/50ML
1 INJECTION, SOLUTION INTRAVENOUS EVERY 24 HOURS
Status: COMPLETED | OUTPATIENT
Start: 2024-10-23 | End: 2024-10-27

## 2024-10-23 RX ORDER — DIPHENHYDRAMINE HYDROCHLORIDE 50 MG/ML
12.5 INJECTION INTRAMUSCULAR; INTRAVENOUS EVERY 6 HOURS PRN
Status: DISCONTINUED | OUTPATIENT
Start: 2024-10-23 | End: 2024-10-28 | Stop reason: HOSPADM

## 2024-10-23 RX ORDER — ONDANSETRON HYDROCHLORIDE 2 MG/ML
4 INJECTION, SOLUTION INTRAVENOUS EVERY 8 HOURS PRN
Status: DISCONTINUED | OUTPATIENT
Start: 2024-10-23 | End: 2024-10-28 | Stop reason: HOSPADM

## 2024-10-23 RX ORDER — ACETAMINOPHEN 160 MG/5ML
650 SOLUTION ORAL EVERY 6 HOURS
Status: DISCONTINUED | OUTPATIENT
Start: 2024-10-23 | End: 2024-10-28 | Stop reason: HOSPADM

## 2024-10-23 RX ORDER — ACETAMINOPHEN 325 MG/1
975 TABLET ORAL ONCE
Status: DISCONTINUED | OUTPATIENT
Start: 2024-10-23 | End: 2024-10-23 | Stop reason: HOSPADM

## 2024-10-23 RX ORDER — ONDANSETRON HYDROCHLORIDE 2 MG/ML
4 INJECTION, SOLUTION INTRAVENOUS ONCE AS NEEDED
Status: DISCONTINUED | OUTPATIENT
Start: 2024-10-23 | End: 2024-10-23 | Stop reason: HOSPADM

## 2024-10-23 RX ORDER — POLYETHYLENE GLYCOL 3350 17 G/17G
17 POWDER, FOR SOLUTION ORAL DAILY
Status: DISCONTINUED | OUTPATIENT
Start: 2024-10-23 | End: 2024-10-28 | Stop reason: HOSPADM

## 2024-10-23 RX ORDER — ACETAMINOPHEN 160 MG/5ML
650 SOLUTION ORAL EVERY 6 HOURS PRN
Status: DISCONTINUED | OUTPATIENT
Start: 2024-10-23 | End: 2024-10-23

## 2024-10-23 RX ORDER — MIDAZOLAM HYDROCHLORIDE 1 MG/ML
INJECTION, SOLUTION INTRAMUSCULAR; INTRAVENOUS AS NEEDED
Status: DISCONTINUED | OUTPATIENT
Start: 2024-10-23 | End: 2024-10-23

## 2024-10-23 RX ORDER — PROPOFOL 10 MG/ML
INJECTION, EMULSION INTRAVENOUS AS NEEDED
Status: DISCONTINUED | OUTPATIENT
Start: 2024-10-23 | End: 2024-10-23

## 2024-10-23 RX ORDER — DIPHENHYDRAMINE HYDROCHLORIDE 50 MG/ML
12.5 INJECTION INTRAMUSCULAR; INTRAVENOUS ONCE AS NEEDED
Status: DISCONTINUED | OUTPATIENT
Start: 2024-10-23 | End: 2024-10-23 | Stop reason: HOSPADM

## 2024-10-23 RX ORDER — BISACODYL 5 MG
10 TABLET, DELAYED RELEASE (ENTERIC COATED) ORAL DAILY PRN
Status: CANCELLED | OUTPATIENT
Start: 2024-10-23

## 2024-10-23 RX ORDER — HYDROMORPHONE HYDROCHLORIDE 1 MG/ML
1 INJECTION, SOLUTION INTRAMUSCULAR; INTRAVENOUS; SUBCUTANEOUS EVERY 5 MIN PRN
Status: DISCONTINUED | OUTPATIENT
Start: 2024-10-23 | End: 2024-10-23 | Stop reason: HOSPADM

## 2024-10-23 RX ORDER — ALBUTEROL SULFATE 0.83 MG/ML
2.5 SOLUTION RESPIRATORY (INHALATION) ONCE AS NEEDED
Status: DISCONTINUED | OUTPATIENT
Start: 2024-10-23 | End: 2024-10-23 | Stop reason: HOSPADM

## 2024-10-23 RX ORDER — FENTANYL CITRATE 50 UG/ML
INJECTION, SOLUTION INTRAMUSCULAR; INTRAVENOUS AS NEEDED
Status: DISCONTINUED | OUTPATIENT
Start: 2024-10-23 | End: 2024-10-23

## 2024-10-23 RX ORDER — MEPERIDINE HYDROCHLORIDE 50 MG/ML
12.5 INJECTION INTRAMUSCULAR; INTRAVENOUS; SUBCUTANEOUS EVERY 10 MIN PRN
Status: DISCONTINUED | OUTPATIENT
Start: 2024-10-23 | End: 2024-10-23 | Stop reason: HOSPADM

## 2024-10-23 RX ORDER — NALOXONE HYDROCHLORIDE 0.4 MG/ML
0.2 INJECTION, SOLUTION INTRAMUSCULAR; INTRAVENOUS; SUBCUTANEOUS EVERY 5 MIN PRN
Status: DISCONTINUED | OUTPATIENT
Start: 2024-10-23 | End: 2024-10-28 | Stop reason: HOSPADM

## 2024-10-23 RX ORDER — ONDANSETRON 4 MG/1
4 TABLET, ORALLY DISINTEGRATING ORAL EVERY 8 HOURS PRN
Status: DISCONTINUED | OUTPATIENT
Start: 2024-10-23 | End: 2024-10-28 | Stop reason: HOSPADM

## 2024-10-23 RX ORDER — POLYETHYLENE GLYCOL 3350 17 G/17G
17 POWDER, FOR SOLUTION ORAL DAILY PRN
Status: DISCONTINUED | OUTPATIENT
Start: 2024-10-23 | End: 2024-10-23

## 2024-10-23 RX ORDER — LIDOCAINE HYDROCHLORIDE 10 MG/ML
0.1 INJECTION, SOLUTION INFILTRATION; PERINEURAL ONCE
Status: DISCONTINUED | OUTPATIENT
Start: 2024-10-23 | End: 2024-10-23 | Stop reason: HOSPADM

## 2024-10-23 RX ORDER — ONDANSETRON HYDROCHLORIDE 2 MG/ML
INJECTION, SOLUTION INTRAVENOUS AS NEEDED
Status: DISCONTINUED | OUTPATIENT
Start: 2024-10-23 | End: 2024-10-23

## 2024-10-23 RX ORDER — SODIUM CHLORIDE, SODIUM LACTATE, POTASSIUM CHLORIDE, CALCIUM CHLORIDE 600; 310; 30; 20 MG/100ML; MG/100ML; MG/100ML; MG/100ML
100 INJECTION, SOLUTION INTRAVENOUS CONTINUOUS
Status: DISCONTINUED | OUTPATIENT
Start: 2024-10-23 | End: 2024-10-23 | Stop reason: HOSPADM

## 2024-10-23 RX ORDER — CEFAZOLIN SODIUM 2 G/100ML
2 INJECTION, SOLUTION INTRAVENOUS EVERY 8 HOURS
Status: COMPLETED | OUTPATIENT
Start: 2024-10-23 | End: 2024-10-24

## 2024-10-23 RX ORDER — OXYCODONE HYDROCHLORIDE 10 MG/1
10 TABLET ORAL EVERY 4 HOURS PRN
Status: DISCONTINUED | OUTPATIENT
Start: 2024-10-23 | End: 2024-10-23 | Stop reason: HOSPADM

## 2024-10-23 SDOH — ECONOMIC STABILITY: HOUSING INSECURITY: AT ANY TIME IN THE PAST 12 MONTHS, WERE YOU HOMELESS OR LIVING IN A SHELTER (INCLUDING NOW)?: NO

## 2024-10-23 SDOH — SOCIAL STABILITY: SOCIAL INSECURITY: DO YOU FEEL UNSAFE GOING BACK TO THE PLACE WHERE YOU ARE LIVING?: NO

## 2024-10-23 SDOH — SOCIAL STABILITY: SOCIAL INSECURITY: WITHIN THE LAST YEAR, HAVE YOU BEEN HUMILIATED OR EMOTIONALLY ABUSED IN OTHER WAYS BY YOUR PARTNER OR EX-PARTNER?: NO

## 2024-10-23 SDOH — SOCIAL STABILITY: SOCIAL INSECURITY
WITHIN THE LAST YEAR, HAVE YOU BEEN KICKED, HIT, SLAPPED, OR OTHERWISE PHYSICALLY HURT BY YOUR PARTNER OR EX-PARTNER?: NO

## 2024-10-23 SDOH — HEALTH STABILITY: MENTAL HEALTH: CURRENT SMOKER: 0

## 2024-10-23 SDOH — SOCIAL STABILITY: SOCIAL INSECURITY: WITHIN THE LAST YEAR, HAVE YOU BEEN AFRAID OF YOUR PARTNER OR EX-PARTNER?: NO

## 2024-10-23 SDOH — ECONOMIC STABILITY: HOUSING INSECURITY: IN THE LAST 12 MONTHS, WAS THERE A TIME WHEN YOU WERE NOT ABLE TO PAY THE MORTGAGE OR RENT ON TIME?: NO

## 2024-10-23 SDOH — SOCIAL STABILITY: SOCIAL INSECURITY: HAVE YOU HAD THOUGHTS OF HARMING ANYONE ELSE?: NO

## 2024-10-23 SDOH — ECONOMIC STABILITY: FOOD INSECURITY: HOW HARD IS IT FOR YOU TO PAY FOR THE VERY BASICS LIKE FOOD, HOUSING, MEDICAL CARE, AND HEATING?: NOT HARD AT ALL

## 2024-10-23 SDOH — ECONOMIC STABILITY: FOOD INSECURITY: WITHIN THE PAST 12 MONTHS, YOU WORRIED THAT YOUR FOOD WOULD RUN OUT BEFORE YOU GOT THE MONEY TO BUY MORE.: NEVER TRUE

## 2024-10-23 SDOH — ECONOMIC STABILITY: TRANSPORTATION INSECURITY: IN THE PAST 12 MONTHS, HAS LACK OF TRANSPORTATION KEPT YOU FROM MEDICAL APPOINTMENTS OR FROM GETTING MEDICATIONS?: NO

## 2024-10-23 SDOH — SOCIAL STABILITY: SOCIAL INSECURITY: DOES ANYONE TRY TO KEEP YOU FROM HAVING/CONTACTING OTHER FRIENDS OR DOING THINGS OUTSIDE YOUR HOME?: NO

## 2024-10-23 SDOH — SOCIAL STABILITY: SOCIAL INSECURITY: HAVE YOU HAD ANY THOUGHTS OF HARMING ANYONE ELSE?: NO

## 2024-10-23 SDOH — SOCIAL STABILITY: SOCIAL INSECURITY: ABUSE: ADULT

## 2024-10-23 SDOH — SOCIAL STABILITY: SOCIAL INSECURITY
WITHIN THE LAST YEAR, HAVE YOU BEEN RAPED OR FORCED TO HAVE ANY KIND OF SEXUAL ACTIVITY BY YOUR PARTNER OR EX-PARTNER?: NO

## 2024-10-23 SDOH — ECONOMIC STABILITY: INCOME INSECURITY: IN THE PAST 12 MONTHS HAS THE ELECTRIC, GAS, OIL, OR WATER COMPANY THREATENED TO SHUT OFF SERVICES IN YOUR HOME?: NO

## 2024-10-23 SDOH — ECONOMIC STABILITY: FOOD INSECURITY: WITHIN THE PAST 12 MONTHS, THE FOOD YOU BOUGHT JUST DIDN'T LAST AND YOU DIDN'T HAVE MONEY TO GET MORE.: NEVER TRUE

## 2024-10-23 SDOH — SOCIAL STABILITY: SOCIAL INSECURITY: WERE YOU ABLE TO COMPLETE ALL THE BEHAVIORAL HEALTH SCREENINGS?: YES

## 2024-10-23 SDOH — SOCIAL STABILITY: SOCIAL INSECURITY: ARE YOU OR HAVE YOU BEEN THREATENED OR ABUSED PHYSICALLY, EMOTIONALLY, OR SEXUALLY BY ANYONE?: NO

## 2024-10-23 SDOH — SOCIAL STABILITY: SOCIAL INSECURITY: HAS ANYONE EVER THREATENED TO HURT YOUR FAMILY OR YOUR PETS?: NO

## 2024-10-23 SDOH — SOCIAL STABILITY: SOCIAL INSECURITY: DO YOU FEEL ANYONE HAS EXPLOITED OR TAKEN ADVANTAGE OF YOU FINANCIALLY OR OF YOUR PERSONAL PROPERTY?: NO

## 2024-10-23 SDOH — SOCIAL STABILITY: SOCIAL INSECURITY: ARE THERE ANY APPARENT SIGNS OF INJURIES/BEHAVIORS THAT COULD BE RELATED TO ABUSE/NEGLECT?: NO

## 2024-10-23 ASSESSMENT — PAIN SCALES - GENERAL
PAINLEVEL_OUTOF10: 0 - NO PAIN
PAINLEVEL_OUTOF10: 8
PAINLEVEL_OUTOF10: 4
PAINLEVEL_OUTOF10: 5 - MODERATE PAIN
PAINLEVEL_OUTOF10: 0 - NO PAIN
PAINLEVEL_OUTOF10: 6
PAINLEVEL_OUTOF10: 3
PAINLEVEL_OUTOF10: 6
PAINLEVEL_OUTOF10: 9
PAINLEVEL_OUTOF10: 6
PAINLEVEL_OUTOF10: 0 - NO PAIN
PAINLEVEL_OUTOF10: 6
PAINLEVEL_OUTOF10: 8
PAINLEVEL_OUTOF10: 0 - NO PAIN
PAINLEVEL_OUTOF10: 6
PAINLEVEL_OUTOF10: 8
PAINLEVEL_OUTOF10: 6

## 2024-10-23 ASSESSMENT — COGNITIVE AND FUNCTIONAL STATUS - GENERAL
TOILETING: A LITTLE
MOBILITY SCORE: 22
DRESSING REGULAR UPPER BODY CLOTHING: A LITTLE
MOBILITY SCORE: 22
WALKING IN HOSPITAL ROOM: A LITTLE
DAILY ACTIVITIY SCORE: 23
TURNING FROM BACK TO SIDE WHILE IN FLAT BAD: A LITTLE
HELP NEEDED FOR BATHING: A LITTLE
DRESSING REGULAR LOWER BODY CLOTHING: A LITTLE
CLIMB 3 TO 5 STEPS WITH RAILING: A LITTLE
DAILY ACTIVITIY SCORE: 19
WALKING IN HOSPITAL ROOM: A LITTLE
DAILY ACTIVITIY SCORE: 24
PERSONAL GROOMING: A LITTLE
PATIENT BASELINE BEDBOUND: NO
CLIMB 3 TO 5 STEPS WITH RAILING: A LITTLE
MOBILITY SCORE: 19
WALKING IN HOSPITAL ROOM: A LITTLE
MOVING TO AND FROM BED TO CHAIR: A LITTLE
CLIMB 3 TO 5 STEPS WITH RAILING: A LITTLE
TOILETING: A LITTLE
STANDING UP FROM CHAIR USING ARMS: A LITTLE

## 2024-10-23 ASSESSMENT — ENCOUNTER SYMPTOMS
EYES NEGATIVE: 1
FREQUENCY: 0
HEMATURIA: 0
BACK PAIN: 0
HEMATOLOGIC/LYMPHATIC NEGATIVE: 1
DYSURIA: 0
DIFFICULTY URINATING: 0
FLANK PAIN: 0
ENDOCRINE NEGATIVE: 1
COUGH: 0
CONSTITUTIONAL NEGATIVE: 1
CONSTIPATION: 0
ABDOMINAL PAIN: 0
NERVOUS/ANXIOUS: 0
FATIGUE: 0
DYSPHORIC MOOD: 0
CARDIOVASCULAR NEGATIVE: 1
DIARRHEA: 0
ARTHRALGIAS: 1
PSYCHIATRIC NEGATIVE: 1
COLOR CHANGE: 0
RESPIRATORY NEGATIVE: 1
DIZZINESS: 0
LIGHT-HEADEDNESS: 0
AGITATION: 0
PALPITATIONS: 0
GASTROINTESTINAL NEGATIVE: 1
CHEST TIGHTNESS: 0
WOUND: 0
CONFUSION: 0
SORE THROAT: 0
SHORTNESS OF BREATH: 0
WEAKNESS: 0
CHILLS: 0
TROUBLE SWALLOWING: 0
ABDOMINAL DISTENTION: 0
FEVER: 0

## 2024-10-23 ASSESSMENT — ACTIVITIES OF DAILY LIVING (ADL)
HEARING - RIGHT EAR: FUNCTIONAL
JUDGMENT_ADEQUATE_SAFELY_COMPLETE_DAILY_ACTIVITIES: YES
PATIENT'S MEMORY ADEQUATE TO SAFELY COMPLETE DAILY ACTIVITIES?: YES
FEEDING YOURSELF: INDEPENDENT
HEARING - LEFT EAR: FUNCTIONAL
WALKS IN HOME: INDEPENDENT
DRESSING YOURSELF: INDEPENDENT
LACK_OF_TRANSPORTATION: NO
ADEQUATE_TO_COMPLETE_ADL: YES
BATHING: INDEPENDENT
GROOMING: INDEPENDENT
TOILETING: INDEPENDENT
ASSISTIVE_DEVICE: EYEGLASSES;CONTACTS
LACK_OF_TRANSPORTATION: NO
LACK_OF_TRANSPORTATION: NO

## 2024-10-23 ASSESSMENT — PAIN DESCRIPTION - DESCRIPTORS: DESCRIPTORS: ACHING

## 2024-10-23 ASSESSMENT — PAIN DESCRIPTION - LOCATION
LOCATION: HIP

## 2024-10-23 ASSESSMENT — PAIN - FUNCTIONAL ASSESSMENT
PAIN_FUNCTIONAL_ASSESSMENT: 0-10

## 2024-10-23 ASSESSMENT — LIFESTYLE VARIABLES
AUDIT-C TOTAL SCORE: 7
SKIP TO QUESTIONS 9-10: 0
HOW OFTEN DURING THE LAST YEAR HAVE YOU FAILED TO DO WHAT WAS NORMALLY EXPECTED FROM YOU BECAUSE OF DRINKING: NEVER
HOW OFTEN DURING THE LAST YEAR HAVE YOU NEEDED AN ALCOHOLIC DRINK FIRST THING IN THE MORNING TO GET YOURSELF GOING AFTER A NIGHT OF HEAVY DRINKING: NEVER
AUDIT-C TOTAL SCORE: 7
HAS A RELATIVE, FRIEND, DOCTOR, OR ANOTHER HEALTH PROFESSIONAL EXPRESSED CONCERN ABOUT YOUR DRINKING OR SUGGESTED YOU CUT DOWN: NO
HOW OFTEN DO YOU HAVE A DRINK CONTAINING ALCOHOL: 4 OR MORE TIMES A WEEK
HOW OFTEN DURING THE LAST YEAR HAVE YOU BEEN UNABLE TO REMEMBER WHAT HAPPENED THE NIGHT BEFORE BECAUSE YOU HAD BEEN DRINKING: NEVER
HOW OFTEN DURING THE LAST YEAR HAVE YOU FOUND THAT YOU WERE NOT ABLE TO STOP DRINKING ONCE YOU HAD STARTED: NEVER
HAVE YOU OR SOMEONE ELSE BEEN INJURED AS A RESULT OF YOUR DRINKING: NO
HOW OFTEN DO YOU HAVE 6 OR MORE DRINKS ON ONE OCCASION: WEEKLY
AUDIT TOTAL SCORE: 7
HOW MANY STANDARD DRINKS CONTAINING ALCOHOL DO YOU HAVE ON A TYPICAL DAY: 1 OR 2
HOW OFTEN DURING THE LAST YEAR HAVE YOU HAD A FEELING OF GUILT OR REMORSE AFTER DRINKING: NEVER
AUDIT TOTAL SCORE: 0

## 2024-10-23 ASSESSMENT — VISUAL ACUITY: OU: 1

## 2024-10-23 ASSESSMENT — PAIN DESCRIPTION - ORIENTATION
ORIENTATION: RIGHT

## 2024-10-23 ASSESSMENT — PATIENT HEALTH QUESTIONNAIRE - PHQ9
2. FEELING DOWN, DEPRESSED OR HOPELESS: NOT AT ALL
1. LITTLE INTEREST OR PLEASURE IN DOING THINGS: NOT AT ALL
SUM OF ALL RESPONSES TO PHQ9 QUESTIONS 1 & 2: 0

## 2024-10-23 ASSESSMENT — PAIN SCALES - PAIN ASSESSMENT IN ADVANCED DEMENTIA (PAINAD): TOTALSCORE: MEDICATION (SEE MAR)

## 2024-10-23 NOTE — H&P
History Of Present Illness  Iona Hilton is a 74 y.o. female with past medical history of vitamin B12 deficiency, vitamin D deficiency, osteoporosis, congenital solitary kidney, GERD, and HTN (on lisinopril) presents to [facility] on 10/22/2024 from home with after a mechanical fall.    Patient states she was attempting to change her close earlier in the evening when she lost her balance causing her to fall on the right hip/buttocks.  She denies hitting her head or loss of consciousness.  She says she was unable to move after the fall, but attempted to slowly boost herself up against the door frame of the bathroom.  Ultimately, she was able to crawl down 6 stairs to reach her phone to call for help.  Currently she is having a throbbing pain in the right hip region which is rated an 8/10.  Of note, patient does not want to be given any IV or p.o. narcotics at this time.  She also mentions that she has always had a difficulty with swallowing pills since she was a child.  She is agreeable to as needed Tylenol suspension and scheduled lidocaine patch for pain management at this time.  Apart from right hip pain, patient has no other concerns at this time.  She denies any recent fever/chills, headache, dizziness, lightheadedness, chest pain, palpitations, shortness of breath, cough, abdominal pain, nausea, vomiting, diarrhea, constipation, dysuria or hematuria.    She has no prior history of general anesthesia complications.  Denies recent tobacco use.  Denies use of recreational drugs such as marijuana.  She endorses drinking 1 beer with dinner around 7 to 8 PM on 10/22.    ED Course:  Vital signs: Temp. 98.2F, HR 99 bpm, RR 18, /63, SPO2 99% on room air   CMP: Glucose 141; otherwise grossly unremarkable  CBC w/diff: WBC 12.0, H&H 11.3 and 33.0 respectively, neutrophils 9.17  UA: +2 nitrites, 75 leukocyte esterase, +2 bacteria, 11-20 WBC  Urine culture process  CXR: No airspace consolidation or pleural effusion  XR  right hip: Acute right femoral neck fracture  CT right hip: Acute impacted angulated and displaced right femoral neck fracture.  Large rectal stool volume.  EKG: Normal sinus rhythm, ventricular rate 95 bpm,  ms, QRS 86 ms, QTc 469 ms. No ST elevation or depression noted.   Disposition: Patient admitted for orthopedic surgery service with plan for possible surgical intervention with Dr. Rust on 10/23.  Medicine team was consulted for preop clearance.    Past Medical History  Past Medical History:   Diagnosis Date    Allergic     Congenital single kidney     GERD (gastroesophageal reflux disease)     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 08/14/2023    Hypertension     Hypo-osmolality and hyponatremia 02/02/2022    Low sodium levels    Hypokalemia 01/31/2022    Iron deficiency 01/31/2022    Low iron    Low iron 08/14/2023    Other conditions influencing health status     Patient denies significant medical history    Post-viral disorder 08/14/2023    Vitamin B12 deficiency     Vitamin D deficiency       Surgical History  She has a past surgical history that includes Breast lumpectomy (05/04/2018) and Other surgical history (05/04/2018).     Social History  She reports that she quit smoking about 32 years ago. Her smoking use included cigarettes. She has never used smokeless tobacco. She reports current alcohol use. She reports that she does not use drugs.    Family History  Family History   Problem Relation Name Age of Onset    Other (heart valve replct) Mother  90    Heart attack Father  47    Hypertension Sister          Allergies  Nickel and Other    Review of Systems   Constitutional:  Negative for chills, fatigue and fever.   HENT:  Negative for hearing loss, sore throat and trouble swallowing.    Eyes:  Negative for visual disturbance.   Respiratory:  Negative for cough, chest tightness and shortness of breath.    Cardiovascular:  Negative for chest pain and palpitations.   Gastrointestinal:   Negative for abdominal distention, abdominal pain, constipation and diarrhea.   Genitourinary:  Negative for difficulty urinating, dysuria, flank pain, frequency, hematuria and urgency.   Musculoskeletal:  Positive for gait problem. Negative for back pain.        Right sided hip/buttocks pain   Skin:  Negative for color change, rash and wound.   Neurological:  Negative for dizziness, syncope, weakness and light-headedness.   Psychiatric/Behavioral:  Negative for agitation, confusion and dysphoric mood. The patient is not nervous/anxious.       Physical Exam  Vitals reviewed.   Constitutional:       General: She is not in acute distress.     Appearance: Normal appearance.   HENT:      Head: Normocephalic and atraumatic.      Right Ear: Hearing and external ear normal.      Left Ear: Hearing and external ear normal.      Mouth/Throat:      Mouth: Mucous membranes are moist.      Pharynx: Oropharynx is clear.   Eyes:      General: Lids are normal. Vision grossly intact.      Pupils: Pupils are equal, round, and reactive to light.   Cardiovascular:      Rate and Rhythm: Normal rate and regular rhythm.      Pulses: Normal pulses.           Radial pulses are 2+ on the right side and 2+ on the left side.        Dorsalis pedis pulses are 2+ on the right side and 2+ on the left side.        Posterior tibial pulses are 2+ on the right side and 2+ on the left side.      Heart sounds: S1 normal and S2 normal. No murmur heard.  Pulmonary:      Effort: Pulmonary effort is normal. No tachypnea or accessory muscle usage.      Breath sounds: Normal breath sounds and air entry. No wheezing or rales.   Abdominal:      General: Bowel sounds are normal. There is no distension.      Palpations: Abdomen is soft.      Tenderness: There is no abdominal tenderness.   Musculoskeletal:      Right upper leg: Tenderness (to palpation over right trochanter region) present.      Right lower leg: No edema.      Left lower leg: No edema.       "Comments: Limited/decreased ROM of right leg.   Pain with minimal internal/external rotation of right hip.  Unable to bend right lower extremity without pain   Skin:     General: Skin is warm and dry.      Capillary Refill: Capillary refill takes less than 2 seconds.      Coloration: Skin is not cyanotic or jaundiced.      Findings: No bruising, ecchymosis, erythema or rash.   Neurological:      General: No focal deficit present.      Mental Status: She is alert and oriented to person, place, and time. Mental status is at baseline.      Sensory: Sensation is intact.      Comments: bilateral hand grasps and foot push/pulls equal   Psychiatric:         Attention and Perception: Attention normal.         Mood and Affect: Mood and affect normal.         Speech: Speech normal.         Behavior: Behavior normal. Behavior is cooperative.       Last Recorded Vitals  Blood pressure 148/70, pulse 96, temperature 37.3 °C (99.1 °F), temperature source Temporal, resp. rate 17, height 1.702 m (5' 7\"), weight 54.4 kg (120 lb), SpO2 99%.  Visit Vitals  /70 (BP Location: Right arm, Patient Position: Lying)   Pulse 96   Temp 37.3 °C (99.1 °F) (Temporal)   Resp 17   Ht 1.702 m (5' 7\")   Wt 54.4 kg (120 lb)   SpO2 99%   BMI 18.79 kg/m²   OB Status Postmenopausal   Smoking Status Former   BSA 1.6 m²     Weight: 54.4 kg (120 lb)   Pain Assessment: 0-10  0-10 (Numeric) Pain Score: 8  Pain Location: Hip  Pain Orientation: Right  Pain Descriptors: Throbbing    Relevant Results  Results for orders placed or performed during the hospital encounter of 10/22/24 (from the past 24 hours)   CBC and Auto Differential   Result Value Ref Range    WBC 12.0 (H) 4.4 - 11.3 x10*3/uL    nRBC 0.0 0.0 - 0.0 /100 WBCs    RBC 3.60 (L) 4.00 - 5.20 x10*6/uL    Hemoglobin 11.3 (L) 12.0 - 16.0 g/dL    Hematocrit 33.0 (L) 36.0 - 46.0 %    MCV 92 80 - 100 fL    MCH 31.4 26.0 - 34.0 pg    MCHC 34.2 32.0 - 36.0 g/dL    RDW 12.9 11.5 - 14.5 %    Platelets 205 " 150 - 450 x10*3/uL    Neutrophils % 76.3 40.0 - 80.0 %    Immature Granulocytes %, Automated 0.3 0.0 - 0.9 %    Lymphocytes % 13.2 13.0 - 44.0 %    Monocytes % 8.3 2.0 - 10.0 %    Eosinophils % 1.7 0.0 - 6.0 %    Basophils % 0.2 0.0 - 2.0 %    Neutrophils Absolute 9.17 (H) 1.60 - 5.50 x10*3/uL    Immature Granulocytes Absolute, Automated 0.04 0.00 - 0.50 x10*3/uL    Lymphocytes Absolute 1.59 0.80 - 3.00 x10*3/uL    Monocytes Absolute 1.00 (H) 0.05 - 0.80 x10*3/uL    Eosinophils Absolute 0.21 0.00 - 0.40 x10*3/uL    Basophils Absolute 0.02 0.00 - 0.10 x10*3/uL   Comprehensive metabolic panel   Result Value Ref Range    Glucose 141 (H) 74 - 99 mg/dL    Sodium 137 136 - 145 mmol/L    Potassium 3.9 3.5 - 5.3 mmol/L    Chloride 103 98 - 107 mmol/L    Bicarbonate 25 21 - 32 mmol/L    Anion Gap 13 10 - 20 mmol/L    Urea Nitrogen 18 6 - 23 mg/dL    Creatinine 0.76 0.50 - 1.05 mg/dL    eGFR 83 >60 mL/min/1.73m*2    Calcium 9.0 8.6 - 10.3 mg/dL    Albumin 3.9 3.4 - 5.0 g/dL    Alkaline Phosphatase 48 33 - 136 U/L    Total Protein 6.5 6.4 - 8.2 g/dL    AST 13 9 - 39 U/L    Bilirubin, Total 0.3 0.0 - 1.2 mg/dL    ALT 13 7 - 45 U/L   Urinalysis with Reflex Culture and Microscopic   Result Value Ref Range    Color, Urine Yellow Light-Yellow, Yellow, Dark-Yellow    Appearance, Urine Clear Clear    Specific Gravity, Urine 1.019 1.005 - 1.035    pH, Urine 6.0 5.0, 5.5, 6.0, 6.5, 7.0, 7.5, 8.0    Protein, Urine NEGATIVE NEGATIVE, 10 (TRACE), 20 (TRACE) mg/dL    Glucose, Urine Normal Normal mg/dL    Blood, Urine NEGATIVE NEGATIVE    Ketones, Urine 20 (1+) (A) NEGATIVE mg/dL    Bilirubin, Urine NEGATIVE NEGATIVE    Urobilinogen, Urine Normal Normal mg/dL    Nitrite, Urine 2+ (A) NEGATIVE    Leukocyte Esterase, Urine 75 Marcy/µL (A) NEGATIVE   Microscopic Only, Urine   Result Value Ref Range    WBC, Urine 11-20 (A) 1-5, NONE /HPF    RBC, Urine 3-5 NONE, 1-2, 3-5 /HPF    Bacteria, Urine 2+ (A) NONE SEEN /HPF    Mucus, Urine FEW Reference  range not established. /LPF      CT hip right wo IV contrast    Result Date: 10/23/2024  Interpreted By:  Omar Sena, STUDY: CT HIP RIGHT WO IV CONTRAST;  10/22/2024 11:47 pm   INDICATION: Signs/Symptoms:preop planning.   COMPARISON: Same-day hip radiographs   ACCESSION NUMBER(S): [id number]   ORDERING CLINICIAN: ROGERS SINGH   TECHNIQUE: Axial CT images of the right hip with coronal and sagittal reconstructed images obtained without intravenous contrast.   FINDINGS:   There is an acute fracture of the right femoral neck mildly comminuted and impacted. Fracture demonstrates anterosuperior displacement by approximately 1/2 bone width as well as mild apex-anterior angulation and varus angulation. There is soft tissue swelling about the fracture site.   Left parasymphyseal inferior pubic ramus bone island. Large rectal stool volume.       1. Acute impacted angulated and displaced right femoral neck fracture. 2. Large rectal stool volume. Please correlate for fecal impaction.   Signed by: Omar Sena 10/23/2024 12:43 AM Dictation workstation:   [id number]    XR chest 1 view    Result Date: 10/22/2024  Interpreted By:  Adolfo Davis, STUDY: XR CHEST 1 VIEW;  10/22/2024 10:55 pm   INDICATION: Signs/Symptoms:preop.   COMPARISON: 2/13/2024   ACCESSION NUMBER(S): [id number]   ORDERING CLINICIAN: ROGERS SINGH   FINDINGS: The cardiac silhouette is normal in size. No focal airspace consolidation or pleural effusion. No pneumothorax. Scoliotic curvature of the thoracolumbar spine.       No airspace consolidation or pleural effusion.   MACRO: None   Signed by: Adolfo Davis 10/22/2024 11:48 PM Dictation workstation:   [id number]    XR hip right with pelvis when performed 2 or 3 views    Result Date: 10/22/2024  Interpreted By:  Adolfo Davis, STUDY: XR HIP RIGHT WITH PELVIS WHEN PERFORMED 2 OR 3 VIEWS; ;  10/22/2024 10:55 pm   INDICATION: Signs/Symptoms:fall, ttp.     COMPARISON: None.    ACCESSION NUMBER(S): [id number]   ORDERING CLINICIAN: DAMIAN MONROY   FINDINGS: There is acute right femoral neck fracture. There is lateral and superior displacement of distal fracture fragment. Levoconvex curvature of the imaged lower lumbar spine. There is osteopenia.       Acute right femoral neck fracture.     MACRO: None   Signed by: Adolfo Davis 10/22/2024 11:47 PM Dictation workstation:   [id number]        Home Medications  Prior to Admission medications    Medication Sig Start Date End Date Taking? Authorizing Provider   ascorbic acid (Vitamin C) 250 mg tablet Take 1 tablet (250 mg) by mouth once daily.    Historical Provider, MD   calcium carbonate-vit D3-min 600 mg calcium- 400 unit tablet Take by mouth. 5/4/18   Historical Provider, MD   cholecalciferol (Vitamin D-3) 125 MCG (5000 UT) capsule Take 1 capsule (125 mcg) by mouth every other day. 2/2/22   Historical Provider, MD CHARLES NATHAN ORAL Take by mouth. 5/24/11   Historical Provider, MD   lisinopril 5 mg tablet TAKE 1 TABLET BY MOUTH EVERY DAY 9/12/24   Alanna Albrecht, APRN-CNP       Medications  Scheduled medications  cefTRIAXone, 1 g, intravenous, q24h  lidocaine, 2 patch, transdermal, Daily      Continuous medications  sodium chloride 0.9%, 100 mL/hr, Last Rate: 100 mL/hr (10/23/24 0243)      PRN medications  PRN medications: acetaminophen, ondansetron        Assessment/Plan   Assessment & Plan  Closed fracture of neck of right femur, initial encounter    Neutrophilic leukocytosis    Acute cystitis without hematuria        Plan:  Code Status: Full Code   IVFs: 0.9% sodium chloride at 100 mL/h continuous while n.p.o.  Abx: 1g IV rocephin x 5 doses for UTI  Meds: Patient currently refusing IV or PO pain medicine other than Tylenol at this time  Tylenol 650 mg p.o. suspension every 6 hours as needed for pain 1-6 or greater/headaches,   Lidocaine 4% patch x 2 transdermal to right hip region  Ondansetron 4 mg IV every 8 hours as needed  nausea/vomiting  Recommend beginning bowel regimen once patient is no longer n.p.o.  Diet: N.p.o.  Vital signs with BP, HR, & RR Q 4 hours.  Pulse ox Q 4 hours.   Admission height and weight.  Labs ordered: CBC, BMP, Mg, Phos.  Type and screen.  ABO/Rh verification.  Coagulation screen.  -Neutrophilic leukocytosis, likely in the setting of UTI and recent fall  Test ordered: Deferred to attending  Monitor / trend labs while inpatient.  Monitor and replace electrolytes as needed.  Aspiration precautions.  Fall precautions   Strict bedrest until postop PT/OT evaluation  PT/OT eval and treat as indicated-hold until after surgery  Call physician for temperature < 36.5 or >38.0 degrees celsius.  Call physician for pulse <50 or >120.  Call physician for respiratory rate <10 or >22.  Call physician for systolic blood pressure <90 or >170.  Call physician for diastolic blood pressure < 50 or >100.  Call physician for pulse ox <92%.  See orders for further plan of care ordered.     VTE prophylaxis:   Recommend starting subcutaneous Lovenox after surgical intervention, deferred to attending.  BLE SCDs.    Medication reconciliation to be completed when nursing/pharmacy  are able to verify patient's accurate home medications.    See additional orders for further plan of care. Any further evaluation and management per attending and consulting physicians.      This note has been transcribed using Dragon voice recognition system and there is a possibility of unintentional typing misprints.  Any information found to be copied from previous providers is done in the best interest of the patient to provide accurate, quality, and continuity of care.     I spent 45 minutes in the professional and overall care of this patient.      NESSA Ernandez-[facility]

## 2024-10-23 NOTE — OP NOTE
Hip Hemiarthroplasty (R) Operative Note     Date: 10/23/2024  OR Location: [facility] OR    Name: Iona Hilton, : 1950, Age: 74 y.o., MRN: [id number], Sex: female      Preoperative diagnosis: Right femoral neck fracture, displaced    Postoperative diagnosis: Right femoral neck fracture, displaced    Procedure planned: Right hip hemiarthroplasty    Procedure performed: Right hip hemiarthroplasty    Surgeon: Kt Nino D.O.    Assistant: PHOENIX Horton  The physician assistant was present to the entire case. Given the nature of the disease process and the procedure to be performed a skilled surgical assistant was necessary during the case. The assistant was necessary in order to hold retractors and directly assist in the operation. A certified scrub tech was at the back table managing instruments and supplies for the surgical case.    Anesthesia: General    Estimated blood loss: Approximately 150 cc    Drains: None    Tourniquet: None    Specimens: None    Implants: Cemented Liseth bipolar hemiarthroplasty components    Indications for procedure: The patient sustained injury to the right hip.  X-rays demonstrated displaced femoral neck fracture.  Treatment options were discussed including operative and nonoperative strategies.  Recommendations were made for medical optimization and surgical clearance and operative treatment of the injury by way of hip hemiarthroplasty.  After full discussion regarding risks benefits and alternatives the patient elected to proceed forth with surgery.  Informed consent was signed and placed in the chart.    Complications: None noted at the time of surgery    Description of procedure: The patient was taken to the operative suite.  A timeout was performed and the right hip confirmed to be the operative site.  The patient was administered appropriate general anesthesia and appropriate preoperative IV antibiotics.  The patient was then carefully transferred from her hospital  bed to the operating table where she was carefully secured in the lateral common position with the left hip towards the ceiling.  Care was taken to pad all bony prominences and peripheral nerves and to place a well-padded axillary roll to the left thank you chest wall.  The patient was then prepped and draped in the normal sterile fashion.  The planned surgical incision was marked out about the lateral thigh/gluteal region.  This incision would allow for a modified direct lateral approach to the hip.  The 10 blade was used to incise skin.  The Bovie cautery device was used to dissect through the subcutaneous plane down to the iliotibial fascia.  The fascia was divided in line with the incision and the Charnley retractor placed.  The anterior third of the vastus lateralis was then elevated off of the proximal femur along with the anterior half of the gluteus medius.  This exposed the confluence of the capsular plane and the gluteus minimus.  An inverted T shaped capsulotomy was created and at the left leg placed in the anterior leg bag.  The sagittal saw was used to cut the remaining portion of the neck.  The power corkscrew was used to remove the femoral head from the acetabulum.  The femoral head was placed on the back table for sizing.  The box osteotome was used to open the proximal femur followed by the canal finder.  Sequential broaching was undertaken up to the point where we had a good fit.  The trial neck and head components were coupled to the stem and atraumatic techniques used to reduce the construct.  The hip was noted to be stable through range of motion, stable in the sleep position, and leg lengths were deemed equal.  Atraumatic techniques were used to dislocate the construct and the trial components were removed.  Irrigation was performed to the proximal femur and acetabulum.  The cement restrictor was then placed into the femoral canal.  Cement was then applied to the proximal femur using a  pressurized technique.  The definitive femoral stem was then inserted and held in good position until such time as the cement had adequately cured.  The definitive bipolar head component was then coupled to the definitive stem by way of 3 light hammer blows.  Again the construct was atraumatically reduced.  Again the hip was taken through range of motion and deemed stable.  The hip was stable in sleep position and leg lengths deemed equal.  Additional irrigation was performed.  Layered closure was then performed using #2 Ethibond to close the capsular plane and to close the division of the vastus lateralis and gluteus medius.  #1 Vicryl was used to close the iliotibial band and the deep subcutaneous plane.  0 Vicryl was used to close the intermediate subcutaneous plane and 2-0 Vicryl used to close the superficial subcutaneous plane.  Staples were used to close the skin.  A dressing was placed.  The patient was then carefully transferred back to the hospital bed.  The patient was allowed to arise from general anesthesia.  The patient was taken to recovery in stable condition.  Overall the patient tolerated the procedure well.    Disposition: Stable to PACU      Kt Nino  Phone Number: [phone]

## 2024-10-23 NOTE — ANESTHESIA PREPROCEDURE EVALUATION
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    intravenous induction   Anesthetic plan and risks discussed with patient.    Plan discussed with CAA.

## 2024-10-23 NOTE — CARE PLAN
The patient's goals for the shift include sleep, pain control.    The clinical goals for the shift include Pt will demonstrate comfort aeb sleeping in intervals of 3 hrs or greater.    Over the shift, the patient did not make progress toward the following goal of sleeping in intervals or 3 hrs or greater.

## 2024-10-23 NOTE — CONSULTS
Inpatient consult to Medicine  Consult performed by: Armando Bradley DO  Consult ordered by: Dominick Robledo DO  Reason for consult: For pre-op clearance          Reason For Consult  For pre-op clearance     History Of Present Illness  Iona Hilton is a 74 y.o. female  PMH of essential HTN on lisinopril, osteoporosis who presented to [facility] ED s/p ground level fall.  Imaging showed displaced right femoral neck fracture. Plan for surgical repair.  Medicine consulted for surgical risk assessment.  Patient is a relatively healthy 74 YOF on low-dose lisinopril for essential hypertension, she has no prior history of MI/CAD, CHF, CVA or CKD.  Former tobacco smoker (quit 1992).  No history of COPD/emphysema.  She has an active lifestyle and can go up at least 3 flights of stairs without getting out of breath, also notes that she carries laundry baskets while going up stairs without any issue.     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    Surgical History  She has a past surgical history that includes Breast lumpectomy (05/04/2018) and Other surgical history (05/04/2018).     Social History  She reports that she quit smoking about 32 years ago. Her smoking use included cigarettes. She has never used smokeless tobacco. She reports current alcohol use. She reports that she does not use drugs.    Family History  Family History   Problem Relation Name Age of Onset    Other (heart valve replct) Mother  90    Heart attack Father  47    Hypertension Sister          Allergies  Nickel and Other    Review of  Systems   12 pt ROS obtained: positives & pertinent negatives listed in HPI      Physical Exam  Constitutional: A&Ox4, NAD, resting comfortable   Cardiovascular: RRR, S1/S2, no murmurs, rubs, or gallops, radial pulses +2  Pulmonary: CTAB, no respiratory distress, no wheezing, rales or rhonchi, on RA  Abdomen: +BS, soft, non-tender, nondistended, no guarding rigidity or rebound tenderness  Neuro: No focal deficits, normal motor function, normal sensation, follows all commands  Skin- No lesions, contusions, or erythema.  Psychiatric: Judgment intact. Appropriate mood, affect and behavior       Last Recorded Vitals  /71 (BP Location: Right arm, Patient Position: Lying)   Pulse 97   Temp 37.3 °C (99.1 °F) (Temporal)   Resp 16   Wt 54.4 kg (120 lb)   SpO2 97%     Relevant Results  CT hip right wo IV contrast    Result Date: 10/23/2024  Interpreted By:  Omar Sena, STUDY: CT HIP RIGHT WO IV CONTRAST;  10/22/2024 11:47 pm   INDICATION: Signs/Symptoms:preop planning.   COMPARISON: Same-day hip radiographs   ACCESSION NUMBER(S): [id number]   ORDERING CLINICIAN: ROGERS SINGH   TECHNIQUE: Axial CT images of the right hip with coronal and sagittal reconstructed images obtained without intravenous contrast.   FINDINGS:   There is an acute fracture of the right femoral neck mildly comminuted and impacted. Fracture demonstrates anterosuperior displacement by approximately 1/2 bone width as well as mild apex-anterior angulation and varus angulation. There is soft tissue swelling about the fracture site.   Left parasymphyseal inferior pubic ramus bone island. Large rectal stool volume.       1. Acute impacted angulated and displaced right femoral neck fracture. 2. Large rectal stool volume. Please correlate for fecal impaction.   Signed by: Omar Sena 10/23/2024 12:43 AM Dictation workstation:   [id number]    XR chest 1 view    Result Date: 10/22/2024  Interpreted By:  Adolfo Davis, STUDY: XR  CHEST 1 VIEW;  10/22/2024 10:55 pm   INDICATION: Signs/Symptoms:preop.   COMPARISON: 2/13/2024   ACCESSION NUMBER(S): [id number]   ORDERING CLINICIAN: ROGERS SINGH   FINDINGS: The cardiac silhouette is normal in size. No focal airspace consolidation or pleural effusion. No pneumothorax. Scoliotic curvature of the thoracolumbar spine.       No airspace consolidation or pleural effusion.   MACRO: None   Signed by: Adolfo Davis 10/22/2024 11:48 PM Dictation workstation:   [id number]    XR hip right with pelvis when performed 2 or 3 views    Result Date: 10/22/2024  Interpreted By:  Adolfo Davis, STUDY: XR HIP RIGHT WITH PELVIS WHEN PERFORMED 2 OR 3 VIEWS; ;  10/22/2024 10:55 pm   INDICATION: Signs/Symptoms:fall, ttp.     COMPARISON: None.   ACCESSION NUMBER(S): [id number]   ORDERING CLINICIAN: DAMIAN MONROY   FINDINGS: There is acute right femoral neck fracture. There is lateral and superior displacement of distal fracture fragment. Levoconvex curvature of the imaged lower lumbar spine. There is osteopenia.       Acute right femoral neck fracture.     MACRO: None   Signed by: Adolfo Davis 10/22/2024 11:47 PM Dictation workstation:   [id number]       Assessment/Plan     74 YOF PMH of essential HTN on lisinopril, osteoporosis who presented to [facility] ED s/p ground level fall.  Imaging showed displaced right femoral neck fracture. Plan for surgical repair.  Medicine consulted for surgical risk assessment.  Patient is a relatively healthy 74 YOF on low-dose lisinopril for essential hypertension, she has no prior history of MI/CAD, CHF, CVA or CKD.  Former tobacco smoker (quit 1992).  No history of COPD/emphysema.  She has an active lifestyle and can go up at least 3 flights of stairs without getting out of breath, also notes that she carries laundry baskets while going up stairs without any issue.  She denies any exertional chest pain, dyspnea or presyncope.  She has a RCRI score of 0.  She is low risk (3.9%)  for 30 day risk of death, MI, or or cardiac arrest, medically optimized for OR.     Can reconsult medicine if any further medicine related issues.    Armando Bradley, DO

## 2024-10-23 NOTE — H&P
History Of Present Illness  Iona Hilton is a 74 y.o. female presenting status post fall.  X-rays demonstrated displaced right femoral neck fracture.  Orthopedic team was contacted for evaluation and treatment..     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    Surgical History  She has a past surgical history that includes Breast lumpectomy (05/04/2018) and Other surgical history (05/04/2018).     Social History  She reports that she quit smoking about 32 years ago. Her smoking use included cigarettes. She has never used smokeless tobacco. She reports current alcohol use. She reports that she does not use drugs.    Family History  Family History   Problem Relation Name Age of Onset    Other (heart valve replct) Mother  90    Heart attack Father  47    Hypertension Sister          Allergies  Nickel and Other    Review of Systems   Constitutional: Negative.    HENT: Negative.     Eyes: Negative.    Respiratory: Negative.     Cardiovascular: Negative.    Gastrointestinal: Negative.    Endocrine: Negative.    Genitourinary: Negative.    Musculoskeletal:  Positive for arthralgias and gait problem.   Skin: Negative.    Hematological: Negative.    Psychiatric/Behavioral: Negative.          Physical Exam  Constitutional:       Appearance: Normal appearance.   HENT:      Head: Normocephalic and atraumatic.      Nose: Nose normal.      Mouth/Throat:      Mouth: Mucous membranes are moist.   Eyes:      Extraocular Movements: Extraocular movements intact.      Pupils: Pupils are equal, round, and reactive to light.  "  Cardiovascular:      Rate and Rhythm: Normal rate and regular rhythm.   Pulmonary:      Effort: Pulmonary effort is normal.   Abdominal:      General: Abdomen is flat.      Palpations: Abdomen is soft.   Musculoskeletal:         General: Swelling, tenderness and deformity present.      Cervical back: Normal range of motion and neck supple.   Skin:     Capillary Refill: Capillary refill takes less than 2 seconds.   Neurological:      General: No focal deficit present.      Mental Status: She is alert.   Psychiatric:         Mood and Affect: Mood normal.     Evaluation of the right lower extremity finds tenderness about the right hip.  Pain with logroll maneuver.  Unable to perform straight leg raise.  Supple compartments to thigh leg and foot.  No open wounds.  No signs of infection.  Intact to EHL FHL dorsiflexion plantarflexion to motor.  Intact to sensory through tibial sural saphenous deep and superficial peroneal nerves to light touch.  No lymphedema or lymphatic streaking.  No open wounds.     Last Recorded Vitals  Blood pressure 146/70, pulse 81, temperature 36 °C (96.8 °F), temperature source Temporal, resp. rate 18, height 1.702 m (5' 7\"), weight 54.4 kg (120 lb), SpO2 97%.    Relevant Results      Scheduled medications  [Transfer Hold] cefTRIAXone, 1 g, intravenous, q24h  lidocaine, 2 patch, transdermal, Daily      Continuous medications  sodium chloride 0.9%, 100 mL/hr, Last Rate: 100 mL/hr (10/23/24 0513)      PRN medications  PRN medications: acetaminophen, ondansetron  Results for orders placed or performed during the hospital encounter of 10/22/24 (from the past 24 hours)   CBC and Auto Differential   Result Value Ref Range    WBC 12.0 (H) 4.4 - 11.3 x10*3/uL    nRBC 0.0 0.0 - 0.0 /100 WBCs    RBC 3.60 (L) 4.00 - 5.20 x10*6/uL    Hemoglobin 11.3 (L) 12.0 - 16.0 g/dL    Hematocrit 33.0 (L) 36.0 - 46.0 %    MCV 92 80 - 100 fL    MCH 31.4 26.0 - 34.0 pg    MCHC 34.2 32.0 - 36.0 g/dL    RDW 12.9 11.5 - " 14.5 %    Platelets 205 150 - 450 x10*3/uL    Neutrophils % 76.3 40.0 - 80.0 %    Immature Granulocytes %, Automated 0.3 0.0 - 0.9 %    Lymphocytes % 13.2 13.0 - 44.0 %    Monocytes % 8.3 2.0 - 10.0 %    Eosinophils % 1.7 0.0 - 6.0 %    Basophils % 0.2 0.0 - 2.0 %    Neutrophils Absolute 9.17 (H) 1.60 - 5.50 x10*3/uL    Immature Granulocytes Absolute, Automated 0.04 0.00 - 0.50 x10*3/uL    Lymphocytes Absolute 1.59 0.80 - 3.00 x10*3/uL    Monocytes Absolute 1.00 (H) 0.05 - 0.80 x10*3/uL    Eosinophils Absolute 0.21 0.00 - 0.40 x10*3/uL    Basophils Absolute 0.02 0.00 - 0.10 x10*3/uL   Comprehensive metabolic panel   Result Value Ref Range    Glucose 141 (H) 74 - 99 mg/dL    Sodium 137 136 - 145 mmol/L    Potassium 3.9 3.5 - 5.3 mmol/L    Chloride 103 98 - 107 mmol/L    Bicarbonate 25 21 - 32 mmol/L    Anion Gap 13 10 - 20 mmol/L    Urea Nitrogen 18 6 - 23 mg/dL    Creatinine 0.76 0.50 - 1.05 mg/dL    eGFR 83 >60 mL/min/1.73m*2    Calcium 9.0 8.6 - 10.3 mg/dL    Albumin 3.9 3.4 - 5.0 g/dL    Alkaline Phosphatase 48 33 - 136 U/L    Total Protein 6.5 6.4 - 8.2 g/dL    AST 13 9 - 39 U/L    Bilirubin, Total 0.3 0.0 - 1.2 mg/dL    ALT 13 7 - 45 U/L   ECG 12 lead   Result Value Ref Range    Ventricular Rate 95 BPM    Atrial Rate 95 BPM    AL Interval 150 ms    QRS Duration 86 ms    QT Interval 374 ms    QTC Calculation(Bazett) 469 ms    P Axis 74 degrees    R Axis 84 degrees    T Axis 27 degrees    QRS Count 16 beats    Q Onset 216 ms    P Onset 141 ms    P Offset 190 ms    T Offset 403 ms    QTC Fredericia 435 ms   Urinalysis with Reflex Culture and Microscopic   Result Value Ref Range    Color, Urine Yellow Light-Yellow, Yellow, Dark-Yellow    Appearance, Urine Clear Clear    Specific Gravity, Urine 1.019 1.005 - 1.035    pH, Urine 6.0 5.0, 5.5, 6.0, 6.5, 7.0, 7.5, 8.0    Protein, Urine NEGATIVE NEGATIVE, 10 (TRACE), 20 (TRACE) mg/dL    Glucose, Urine Normal Normal mg/dL    Blood, Urine NEGATIVE NEGATIVE    Ketones,  Urine 20 (1+) (A) NEGATIVE mg/dL    Bilirubin, Urine NEGATIVE NEGATIVE    Urobilinogen, Urine Normal Normal mg/dL    Nitrite, Urine 2+ (A) NEGATIVE    Leukocyte Esterase, Urine 75 Marcy/µL (A) NEGATIVE   Microscopic Only, Urine   Result Value Ref Range    WBC, Urine 11-20 (A) 1-5, NONE /HPF    RBC, Urine 3-5 NONE, 1-2, 3-5 /HPF    Bacteria, Urine 2+ (A) NONE SEEN /HPF    Mucus, Urine FEW Reference range not established. /LPF   CBC   Result Value Ref Range    WBC 11.9 (H) 4.4 - 11.3 x10*3/uL    nRBC 0.0 0.0 - 0.0 /100 WBCs    RBC 3.66 (L) 4.00 - 5.20 x10*6/uL    Hemoglobin 11.2 (L) 12.0 - 16.0 g/dL    Hematocrit 32.9 (L) 36.0 - 46.0 %    MCV 90 80 - 100 fL    MCH 30.6 26.0 - 34.0 pg    MCHC 34.0 32.0 - 36.0 g/dL    RDW 12.9 11.5 - 14.5 %    Platelets 201 150 - 450 x10*3/uL   Type and screen   Result Value Ref Range    ABO TYPE B     Rh TYPE POS     ANTIBODY SCREEN NEG    Coagulation Screen   Result Value Ref Range    Protime 11.1 9.8 - 12.8 seconds    INR 1.0 0.9 - 1.1    aPTT 28 27 - 38 seconds   Basic metabolic panel   Result Value Ref Range    Glucose 125 (H) 74 - 99 mg/dL    Sodium 139 136 - 145 mmol/L    Potassium 4.2 3.5 - 5.3 mmol/L    Chloride 107 98 - 107 mmol/L    Bicarbonate 24 21 - 32 mmol/L    Anion Gap 12 10 - 20 mmol/L    Urea Nitrogen 14 6 - 23 mg/dL    Creatinine 0.59 0.50 - 1.05 mg/dL    eGFR >90 >60 mL/min/1.73m*2    Calcium 8.8 8.6 - 10.3 mg/dL   Magnesium   Result Value Ref Range    Magnesium 1.88 1.60 - 2.40 mg/dL   Phosphorus   Result Value Ref Range    Phosphorus 3.6 2.5 - 4.9 mg/dL       Assessment/Plan   Assessment & Plan  Closed fracture of neck of right femur, initial encounter    Neutrophilic leukocytosis    Acute cystitis without hematuria      Assessment: Displaced right femoral neck fracture  Plan: Treatment options were discussed.  We talked about operative and nonoperative strategies.  Recommendations were made for right hip hemiarthroplasty.  Patient elects to proceed forth with right  hip hemiarthroplasty.  We discussed the nickel allergy specifically and how there is some trace nickel within the implant itself.  Patient concedes the risk allergy and elects to proceed forth with standard cemented right hip hemiarthroplasty implant.  Informed consent was signed and placed in the chart.  Plan for right hip hemiarthroplasty later this afternoon.             Kt Nino, DO

## 2024-10-23 NOTE — ANESTHESIA POSTPROCEDURE EVALUATION
Patient: Iona Hilton    Procedure Summary       Date: 10/23/24 Room / Location: [facility] OR 06 / [facility]    Anesthesia Start: 1332 Anesthesia Stop: 1542    Procedure: Hip Hemiarthroplasty (Right: Hip) Diagnosis:       Closed fracture of neck of right femur, initial encounter      Closed displaced fracture of right femoral neck      (Closed fracture of neck of right femur, initial encounter [S72.001A])      (Closed displaced fracture of right femoral neck [S72.001A])    Surgeons: Kt Nino DO Responsible Provider: Wes Hernandez MD    Anesthesia Type: general ASA Status: 2            Anesthesia Type: general    Vitals Value Taken Time   /63 10/23/24 1542   Temp 36.4 10/23/24 1542   Pulse 77 10/23/24 1542   Resp 18 10/23/24 1542   SpO2 100 10/23/24 1542       Anesthesia Post Evaluation    Patient location during evaluation: PACU  Patient participation: complete - patient participated  Level of consciousness: awake  Pain management: adequate  Airway patency: patent  Cardiovascular status: acceptable  Respiratory status: acceptable and face mask  Hydration status: acceptable  Postoperative Nausea and Vomiting: none      No notable events documented.     Hpi Title: Evaluation of Skin Lesions

## 2024-10-23 NOTE — ANESTHESIA PROCEDURE NOTES
Airway  Date/Time: 10/23/2024 1:47 PM  Urgency: elective    Airway not difficult    Staffing  Performed: CAA and NELSON   Authorized by: Wes Hernandez MD    Performed by: AYANA Narvaez  Patient location during procedure: OR    Indications and Patient Condition  Indications for airway management: anesthesia  Spontaneous Ventilation: absent  Sedation level: deep  Preoxygenated: yes  Patient position: sniffing  MILS maintained throughout  Mask difficulty assessment: 1 - vent by mask  Planned trial extubation    Final Airway Details  Final airway type: endotracheal airway      Successful airway: ETT  Cuffed: yes   Successful intubation technique: direct laryngoscopy  Facilitating devices/methods: intubating stylet  Endotracheal tube insertion site: oral  Blade: Nacho  Blade size: #3  ETT size (mm): 7.0  Cormack-Lehane Classification: grade I - full view of glottis  Placement verified by: chest auscultation   Measured from: lips  ETT to lips (cm): 22  Number of attempts at approach: 1

## 2024-10-23 NOTE — PROGRESS NOTES
10/23/24 1050   Discharge Planning   Living Arrangements Spouse/significant other   Support Systems Spouse/significant other   Assistance Needed none   Type of Residence Private residence   Number of Stairs to Enter Residence 3   Number of Stairs Within Residence 13  (7 up and 6 down)   Home or Post Acute Services None   Expected Discharge Disposition Home   Financial Resource Strain   How hard is it for you to pay for the very basics like food, housing, medical care, and heating? Not hard   Housing Stability   In the last 12 months, was there a time when you were not able to pay the mortgage or rent on time? N   In the past 12 months, how many times have you moved where you were living? 1   At any time in the past 12 months, were you homeless or living in a shelter (including now)? N   Transportation Needs   In the past 12 months, has lack of transportation kept you from medical appointments or from getting medications? no   In the past 12 months, has lack of transportation kept you from meetings, work, or from getting things needed for daily living? No     Spoke to patient at bedside to explain my role in discharge planning. Patient states she lives at home with her . Patient does not use any DME, but may be able to borrow a walker after surgery. Surgery scheduled for today around 1pm. Patient states she has wine with dinner most nights, but denies needing any resources or feeling like she could not stop. PCP is Dr Stern. Patient states she prefers to go home when medically ready. Patient will need surgery then therapy evals. CT team to follow.

## 2024-10-23 NOTE — SIGNIFICANT EVENT
74 YOF PMH of essential HTN on lisinopril, osteoporosis who presented to [facility] ED s/p ground level fall.  Imaging showed displaced right femoral neck fracture. Plan for surgical repair.  Medicine consulted for surgical risk assessment.  Patient is a relatively healthy 74 YOF on low-dose lisinopril for essential hypertension, she has no prior history of MI/CAD, CHF, CVA or CKD.  Former tobacco smoker (quit 1992).  No history of COPD/emphysema.  She has an active lifestyle and can go up at least 3 flights of stairs without getting out of breath, also notes that she carries laundry baskets while going up stairs without any issue.  She denies any exertional chest pain, dyspnea or presyncope.  She has a RCRI score of 0.  She is low risk (3.9%) for 30 day risk of death, MI, or or cardiac arrest, medically optimized for OR.    PE  Constitutional: A&Ox4, NAD, resting comfortable   Cardiovascular: RRR, S1/S2, no murmurs, rubs, or gallops, radial pulses +2  Pulmonary: CTAB, no respiratory distress, no wheezing, rales or rhonchi, on RA  Abdomen: +BS, soft, non-tender, nondistended, no guarding rigidity or rebound tenderness  Neuro: No focal deficits, normal motor function, normal sensation, follows all commands  Skin- No lesions, contusions, or erythema.  Psychiatric: Judgment intact. Appropriate mood, affect and behavior    Armando Bradley DO  Internal Medicine

## 2024-10-23 NOTE — ED PROVIDER NOTES
EMERGENCY DEPARTMENT ENCOUNTER      Pt Name: Iona Hilton  MRN: [id number]  Birthdate 1950  Date of evaluation: 10/22/2024  Provider: Dominick Robledo DO    CHIEF COMPLAINT       Chief Complaint   Patient presents with    Injury     Right hip, pt states she was taking off her jeans when she tripped and fell. Landed on right hip     HISTORY OF PRESENT ILLNESS    Iona Hilton is a 74 y.o. year old female who presents to the ER for mechanical ground level fall, lost balance while putting on pajamas, fell on right buttock.  Stood up after falling while leaning against door frame. No head injury, no LOC, no knee pain, no LLE pain. No no numbness or tingling.   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      calcium carbonate-vit D3-min 600 mg calcium- 400 unit tablet Take 1 tablet by mouth once daily.      cholecalciferol (Vitamin D-3) 125 MCG (5000 UT) capsule Take 1 capsule (125 mcg) by mouth every other day.      HAWTHORN NATHAN ORAL Take 1 tablet by mouth once daily.      lisinopril 5 mg tablet TAKE 1 TABLET BY MOUTH EVERY DAY  Qty: 90 tablet, Refills: 1    Associated  Diagnoses: Primary hypertension           SURGICAL HISTORY       Past Surgical History:   Procedure Laterality Date    BREAST LUMPECTOMY  2018    Breast Surgery Lumpectomy    OTHER SURGICAL HISTORY  2018    Hysteroscopy     ALLERGIES     Nickel and Other  FAMILY HISTORY       Family History   Problem Relation Name Age of Onset    Other (heart valve replct) Mother  90    Heart attack Father  47    Hypertension Sister       SOCIAL HISTORY       Social History     Tobacco Use    Smoking status: Former     Current packs/day: 0.00     Types: Cigarettes     Quit date:      Years since quittin.8    Smokeless tobacco: Never   Vaping Use    Vaping status: Never Used   Substance Use Topics    Alcohol use: Yes     Comment: wine one per day    Drug use: Never     PHYSICAL EXAM  (up to 7 for level 4, 8 or more for level 5)     ED Triage Vitals [10/22/24 2205]   Temperature Heart Rate Respirations BP   36.8 °C (98.2 °F) 99 18 115/63      Pulse Ox Temp Source Heart Rate Source Patient Position   99 % Temporal Monitor Sitting      BP Location FiO2 (%)     Right arm --       Physical Exam  Vitals and nursing note reviewed.   Constitutional:       General: She is not in acute distress.     Appearance: Normal appearance. She is not ill-appearing.   HENT:      Head: Normocephalic and atraumatic. No raccoon eyes, Clarke's sign, contusion or laceration.      Jaw: No trismus or malocclusion.      Right Ear: External ear normal.      Left Ear: External ear normal.      Mouth/Throat:      Mouth: Mucous membranes are moist.      Pharynx: Oropharynx is clear.   Eyes:      Extraocular Movements: Extraocular movements intact.      Pupils: Pupils are equal, round, and reactive to light.   Neck:      Trachea: No tracheal deviation.   Cardiovascular:      Rate and Rhythm: Normal rate and regular rhythm.      Pulses: Normal pulses.           Dorsalis pedis pulses are 2+ on the right side and 2+ on the left side.         Posterior tibial pulses are 2+ on the right side and 2+ on the left side.   Pulmonary:      Effort: No respiratory distress.      Breath sounds: No wheezing, rhonchi or rales.   Chest:      Chest wall: No tenderness.   Abdominal:      General: Abdomen is flat.      Palpations: Abdomen is soft. There is no mass.      Tenderness: There is no abdominal tenderness.   Musculoskeletal:         General: No signs of injury. Tenderness: R greater trochanter.     Right hip: Decreased range of motion.      Left hip: Normal.      Right upper leg: Tenderness: Greater trochanter.      Right lower leg: No lacerations or tenderness. No edema.      Left lower leg: No edema.      Right ankle: No tenderness. Normal range of motion.      Right foot: Normal range of motion. No deformity or tenderness.   Skin:     Coloration: Skin is not jaundiced or pale.      Findings: No petechiae, rash or wound.   Neurological:      Mental Status: She is alert.   Psychiatric:         Speech: Speech normal.         Thought Content: Thought content does not include homicidal or suicidal ideation.        DIAGNOSTIC RESULTS   LABS:  Labs Reviewed   URINE CULTURE - Abnormal       Result Value    Urine Culture 20,000 - 80,000 Escherichia coli (*)    CBC WITH AUTO DIFFERENTIAL - Abnormal    WBC 12.0 (*)     nRBC 0.0      RBC 3.60 (*)     Hemoglobin 11.3 (*)     Hematocrit 33.0 (*)     MCV 92      MCH 31.4      MCHC 34.2      RDW 12.9      Platelets 205      Neutrophils % 76.3      Immature Granulocytes %, Automated 0.3      Lymphocytes % 13.2      Monocytes % 8.3      Eosinophils % 1.7      Basophils % 0.2      Neutrophils Absolute 9.17 (*)     Immature Granulocytes Absolute, Automated 0.04      Lymphocytes Absolute 1.59      Monocytes Absolute 1.00 (*)     Eosinophils Absolute 0.21      Basophils Absolute 0.02     COMPREHENSIVE METABOLIC PANEL - Abnormal    Glucose 141 (*)     Sodium 137      Potassium 3.9      Chloride 103      Bicarbonate 25      Anion  Gap 13      Urea Nitrogen 18      Creatinine 0.76      eGFR 83      Calcium 9.0      Albumin 3.9      Alkaline Phosphatase 48      Total Protein 6.5      AST 13      Bilirubin, Total 0.3      ALT 13     BASIC METABOLIC PANEL - Abnormal    Glucose 125 (*)     Sodium 139      Potassium 4.2      Chloride 107      Bicarbonate 24      Anion Gap 12      Urea Nitrogen 14      Creatinine 0.59      eGFR >90      Calcium 8.8     CBC - Abnormal    WBC 11.9 (*)     nRBC 0.0      RBC 3.66 (*)     Hemoglobin 11.2 (*)     Hematocrit 32.9 (*)     MCV 90      MCH 30.6      MCHC 34.0      RDW 12.9      Platelets 201     URINALYSIS WITH REFLEX CULTURE AND MICROSCOPIC - Abnormal    Color, Urine Yellow      Appearance, Urine Clear      Specific Gravity, Urine 1.019      pH, Urine 6.0      Protein, Urine NEGATIVE      Glucose, Urine Normal      Blood, Urine NEGATIVE      Ketones, Urine 20 (1+) (*)     Bilirubin, Urine NEGATIVE      Urobilinogen, Urine Normal      Nitrite, Urine 2+ (*)     Leukocyte Esterase, Urine 75 Marcy/µL (*)    MICROSCOPIC ONLY, URINE - Abnormal    WBC, Urine 11-20 (*)     RBC, Urine 3-5      Bacteria, Urine 2+ (*)     Mucus, Urine FEW     CBC - Abnormal    WBC 8.6      nRBC 0.0      RBC 3.16 (*)     Hemoglobin 9.8 (*)     Hematocrit 29.6 (*)     MCV 94      MCH 31.0      MCHC 33.1      RDW 13.2      Platelets 156     BASIC METABOLIC PANEL - Abnormal    Glucose 114 (*)     Sodium 139      Potassium 4.1      Chloride 106      Bicarbonate 27      Anion Gap 10      Urea Nitrogen 9      Creatinine 0.63      eGFR >90      Calcium 8.6     MAGNESIUM - Normal    Magnesium 1.88     PHOSPHORUS - Normal    Phosphorus 3.6     COAGULATION SCREEN - Normal    Protime 11.1      INR 1.0      aPTT 28      Narrative:     The APTT is no longer used for monitoring Unfractionated Heparin Therapy. For monitoring Heparin Therapy, use the Heparin Assay.   MAGNESIUM - Normal    Magnesium 1.89     TYPE AND SCREEN    ABO TYPE B      Rh TYPE POS       ANTIBODY SCREEN NEG     URINALYSIS WITH REFLEX CULTURE AND MICROSCOPIC    Narrative:     The following orders were created for panel order Urinalysis with Reflex Culture and Microscopic.  Procedure                               Abnormality         Status                     ---------                               -----------         ------                     Urinalysis with Reflex C...[[id number]]  Abnormal            Final result               Extra Urine Gray Tube[[id number]]                            Final result                 Please view results for these tests on the individual orders.   EXTRA URINE GRAY TUBE    Extra Tube Hold for add-ons.       All other labs were within normal range or not returned as of this dictation.  Imaging  XR pelvis 1-2 views   Final Result   1. Right hip hemiarthroplasty without hardware complication.        MACRO:   None.        Signed by: Tamiko Montiel 10/23/2024 7:26 PM   Dictation workstation:   [id number]      CT hip right wo IV contrast   Final Result   1. Acute impacted angulated and displaced right femoral neck fracture.   2. Large rectal stool volume. Please correlate for fecal impaction.        Signed by: Omar Sena 10/23/2024 12:43 AM   Dictation workstation:   [id number]      XR hip right with pelvis when performed 2 or 3 views   Final Result   Acute right femoral neck fracture.             MACRO:   None        Signed by: Adolfo Davis 10/22/2024 11:47 PM   Dictation workstation:   [id number]      XR chest 1 view   Final Result   No airspace consolidation or pleural effusion.        MACRO:   None        Signed by: Adolfo Davis 10/22/2024 11:48 PM   Dictation workstation:   [id number]         Procedure  Procedures  EMERGENCY DEPARTMENT COURSE/MDM:   Medical Decision Making    Vitals:    Vitals:    10/26/24 0819 10/26/24 0941 10/26/24 1558 10/26/24 2000   BP: 113/62 122/64 133/60 155/80   BP Location: Right arm  Right arm Right arm   Patient  Position: Lying  Sitting Lying   Pulse: 82  107 102   Resp: 18  18 16   Temp: 37.2 °C (99 °F)  36.4 °C (97.5 °F) 36.6 °C (97.9 °F)   TempSrc:   Temporal Temporal   SpO2: 99%  100% 100%   Weight:       Height:         Iona Hilton is a female 74 y.o. who presents to the ER for mechanical ground-level fall. On arrival the patients vital signs were: Afebrile, Reguar HR, Normotensive, Regular RR, and Normoxic on room air. History obtained from: patient. RLE no paresthesias, numbness, warm, soft, only ttp over greater trochanter, no s/s of neurovascular compromise. Plan for XR R hip. Analgesia offered, declined.     ED Course as of 10/26/24 2227   Tue Oct 22, 2024   2316 Discussed with ortho, Dr. Cindy Rust, who recommended NPOM, CT R hip w/o contrast for surgical planning, admit to medicine [CB]   Wed Oct 23, 2024   0011 Discussed with Dr. Bradley, medicine, patient is more appropriate for ortho admit, willing to be on consult if needed. [CB]   0019 Comprehensive metabolic panel(!)  Normoglycemic, no acute electrolyte or hepatorenal abnormality [CB]   0019 CBC and Auto Differential(!)  Reactive leukocytosis, otherwise no significant acute anemia thrombocytosis with cytopenia [CB]      ED Course User Index  [CB] Julius Nolan,          Diagnoses as of 10/26/24 2227   Closed fracture of neck of right femur, initial encounter       External Records Reviewed: I reviewed recent and relevant outside records including inpatient notes, outpatient records    Shared decision making for disposition  Patient and/or patient´s representative was counseled regarding labs, imaging, likely diagnosis. All questions were answered. Recommendation was made   for Admission given the need for further escalation of care to inpatient management. Patient agreed and was admitted in stable condition. Admitting team was notified of any pending labs or imaging to ensure continuity of care.     ED Medications administered this visit:     Medications   sodium chloride 0.9% infusion ( intravenous Stopped 10/23/24 1542)   lidocaine 4 % patch 2 patch (0 patches transdermal Medication Removed 10/26/24 2006)   cefTRIAXone (Rocephin) 1 g in dextrose (iso) IV 50 mL (0 g intravenous Stopped 10/26/24 0632)   oxygen (O2) therapy (0 L/min inhalation Stopped 10/23/24 1754)   enoxaparin (Lovenox) syringe 40 mg (40 mg subcutaneous Given 10/26/24 0852)   lactated Ringer's infusion (0 mL/hr intravenous Stopped 10/24/24 0900)   naloxone (Narcan) injection 0.2 mg (has no administration in time range)   acetaminophen (Tylenol) tablet 650 mg (650 mg oral Given 10/23/24 1739)   morphine injection 2 mg (has no administration in time range)   ondansetron ODT (Zofran-ODT) disintegrating tablet 4 mg (has no administration in time range)     Or   ondansetron (Zofran) injection 4 mg (has no administration in time range)   metoclopramide (Reglan) tablet 10 mg (has no administration in time range)     Or   metoclopramide (Reglan) injection 10 mg (has no administration in time range)   bisacodyl (Dulcolax) EC tablet 10 mg (has no administration in time range)   bisacodyl (Dulcolax) suppository 10 mg (has no administration in time range)   polyethylene glycol (Glycolax, Miralax) packet 17 g (17 g oral Not Given 10/26/24 0926)   diphenhydrAMINE (BENADryl) injection 12.5 mg (has no administration in time range)   acetaminophen (Tylenol) oral liquid 650 mg (650 mg oral Given 10/26/24 1718)   lisinopril tablet 5 mg (5 mg oral Given 10/26/24 0852)   traMADol (Ultram) tablet 25 mg (has no administration in time range)   traMADol (Ultram) tablet 50 mg (50 mg oral Given 10/26/24 2217)   ceFAZolin (Ancef) 2 g in dextrose (iso)  mL (0 g intravenous Stopped 10/23/24 0921)   ceFAZolin (Ancef) 2 g in dextrose (iso)  mL (0 g intravenous Stopped 10/24/24 0635)       New Prescriptions from this visit:    Current Discharge Medication List          Follow-up:  No follow-up provider  specified.      Final Impression:   1. Closed fracture of neck of right femur, initial encounter    2. Closed displaced fracture of right femoral neck          Please excuse any misspellings or unintended errors related to the Dragon speech recognition software used to dictate this note.    I reviewed the case with the attending ED physician. The attending ED physician agrees with the plan.      Julius Nolan DO  Resident  10/23/24 0021    The patient was seen by the resident/fellow.  I have personally performed a substantive portion of the encounter.  I have seen and examined the patient; agree with the workup, evaluation, MDM, management and diagnosis.  The care plan has been discussed with the resident; I have reviewed the resident’s note and agree with the documented findings.                                   Dominick Robledo DO  10/26/24 1178

## 2024-10-24 LAB
ANION GAP SERPL CALC-SCNC: 10 MMOL/L (ref 10–20)
BUN SERPL-MCNC: 9 MG/DL (ref 6–23)
CALCIUM SERPL-MCNC: 8.6 MG/DL (ref 8.6–10.3)
CHLORIDE SERPL-SCNC: 106 MMOL/L (ref 98–107)
CO2 SERPL-SCNC: 27 MMOL/L (ref 21–32)
CREAT SERPL-MCNC: 0.63 MG/DL (ref 0.5–1.05)
EGFRCR SERPLBLD CKD-EPI 2021: >90 ML/MIN/1.73M*2
ERYTHROCYTE [DISTWIDTH] IN BLOOD BY AUTOMATED COUNT: 13.2 % (ref 11.5–14.5)
GLUCOSE SERPL-MCNC: 114 MG/DL (ref 74–99)
HCT VFR BLD AUTO: 29.6 % (ref 36–46)
HGB BLD-MCNC: 9.8 G/DL (ref 12–16)
MAGNESIUM SERPL-MCNC: 1.89 MG/DL (ref 1.6–2.4)
MCH RBC QN AUTO: 31 PG (ref 26–34)
MCHC RBC AUTO-ENTMCNC: 33.1 G/DL (ref 32–36)
MCV RBC AUTO: 94 FL (ref 80–100)
NRBC BLD-RTO: 0 /100 WBCS (ref 0–0)
PLATELET # BLD AUTO: 156 X10*3/UL (ref 150–450)
POTASSIUM SERPL-SCNC: 4.1 MMOL/L (ref 3.5–5.3)
RBC # BLD AUTO: 3.16 X10*6/UL (ref 4–5.2)
SODIUM SERPL-SCNC: 139 MMOL/L (ref 136–145)
WBC # BLD AUTO: 8.6 X10*3/UL (ref 4.4–11.3)

## 2024-10-24 PROCEDURE — 97530 THERAPEUTIC ACTIVITIES: CPT | Mod: GP | Performed by: PHYSICAL THERAPIST

## 2024-10-24 PROCEDURE — 97535 SELF CARE MNGMENT TRAINING: CPT | Mod: GO | Performed by: OCCUPATIONAL THERAPIST

## 2024-10-24 PROCEDURE — 2500000004 HC RX 250 GENERAL PHARMACY W/ HCPCS (ALT 636 FOR OP/ED): Performed by: PHYSICIAN ASSISTANT

## 2024-10-24 PROCEDURE — 1100000001 HC PRIVATE ROOM DAILY

## 2024-10-24 PROCEDURE — 36415 COLL VENOUS BLD VENIPUNCTURE: CPT | Performed by: NURSE PRACTITIONER

## 2024-10-24 PROCEDURE — 83735 ASSAY OF MAGNESIUM: CPT | Performed by: PHYSICIAN ASSISTANT

## 2024-10-24 PROCEDURE — 85027 COMPLETE CBC AUTOMATED: CPT | Performed by: NURSE PRACTITIONER

## 2024-10-24 PROCEDURE — 94760 N-INVAS EAR/PLS OXIMETRY 1: CPT

## 2024-10-24 PROCEDURE — 97165 OT EVAL LOW COMPLEX 30 MIN: CPT | Mod: GO | Performed by: OCCUPATIONAL THERAPIST

## 2024-10-24 PROCEDURE — 2500000001 HC RX 250 WO HCPCS SELF ADMINISTERED DRUGS (ALT 637 FOR MEDICARE OP): Performed by: PHYSICIAN ASSISTANT

## 2024-10-24 PROCEDURE — 2500000005 HC RX 250 GENERAL PHARMACY W/O HCPCS: Performed by: PHYSICIAN ASSISTANT

## 2024-10-24 PROCEDURE — 97161 PT EVAL LOW COMPLEX 20 MIN: CPT | Mod: GP | Performed by: PHYSICAL THERAPIST

## 2024-10-24 PROCEDURE — 2500000004 HC RX 250 GENERAL PHARMACY W/ HCPCS (ALT 636 FOR OP/ED): Performed by: NURSE PRACTITIONER

## 2024-10-24 PROCEDURE — 80048 BASIC METABOLIC PNL TOTAL CA: CPT | Performed by: NURSE PRACTITIONER

## 2024-10-24 RX ORDER — TRAMADOL HYDROCHLORIDE 50 MG/1
25 TABLET ORAL EVERY 8 HOURS PRN
Status: DISCONTINUED | OUTPATIENT
Start: 2024-10-24 | End: 2024-10-28 | Stop reason: HOSPADM

## 2024-10-24 RX ORDER — LISINOPRIL 5 MG/1
5 TABLET ORAL DAILY
Status: DISCONTINUED | OUTPATIENT
Start: 2024-10-24 | End: 2024-10-28 | Stop reason: HOSPADM

## 2024-10-24 RX ORDER — TRAMADOL HYDROCHLORIDE 50 MG/1
50 TABLET ORAL EVERY 8 HOURS PRN
Status: DISCONTINUED | OUTPATIENT
Start: 2024-10-24 | End: 2024-10-28 | Stop reason: HOSPADM

## 2024-10-24 ASSESSMENT — PAIN SCALES - GENERAL
PAINLEVEL_OUTOF10: 7
PAINLEVEL_OUTOF10: 3
PAINLEVEL_OUTOF10: 5 - MODERATE PAIN
PAINLEVEL_OUTOF10: 0 - NO PAIN
PAINLEVEL_OUTOF10: 2
PAINLEVEL_OUTOF10: 9
PAINLEVEL_OUTOF10: 9
PAINLEVEL_OUTOF10: 5 - MODERATE PAIN

## 2024-10-24 ASSESSMENT — PAIN - FUNCTIONAL ASSESSMENT
PAIN_FUNCTIONAL_ASSESSMENT: 0-10

## 2024-10-24 ASSESSMENT — COGNITIVE AND FUNCTIONAL STATUS - GENERAL
MOVING FROM LYING ON BACK TO SITTING ON SIDE OF FLAT BED WITH BEDRAILS: A LOT
DRESSING REGULAR LOWER BODY CLOTHING: A LOT
DAILY ACTIVITIY SCORE: 17
TURNING FROM BACK TO SIDE WHILE IN FLAT BAD: A LITTLE
MOVING TO AND FROM BED TO CHAIR: A LOT
PERSONAL GROOMING: A LITTLE
PERSONAL GROOMING: A LITTLE
DAILY ACTIVITIY SCORE: 16
HELP NEEDED FOR BATHING: A LOT
DRESSING REGULAR UPPER BODY CLOTHING: A LITTLE
HELP NEEDED FOR BATHING: A LITTLE
WALKING IN HOSPITAL ROOM: A LOT
STANDING UP FROM CHAIR USING ARMS: A LOT
MOBILITY SCORE: 14
STANDING UP FROM CHAIR USING ARMS: A LOT
WALKING IN HOSPITAL ROOM: A LOT
TURNING FROM BACK TO SIDE WHILE IN FLAT BAD: A LOT
TOILETING: A LOT
MOBILITY SCORE: 12
CLIMB 3 TO 5 STEPS WITH RAILING: A LOT
TOILETING: A LOT
CLIMB 3 TO 5 STEPS WITH RAILING: A LOT
DRESSING REGULAR UPPER BODY CLOTHING: A LITTLE
MOVING FROM LYING ON BACK TO SITTING ON SIDE OF FLAT BED WITH BEDRAILS: A LITTLE
DRESSING REGULAR LOWER BODY CLOTHING: A LOT
MOVING TO AND FROM BED TO CHAIR: A LOT

## 2024-10-24 ASSESSMENT — PAIN DESCRIPTION - LOCATION: LOCATION: HIP

## 2024-10-24 ASSESSMENT — PAIN DESCRIPTION - ORIENTATION: ORIENTATION: RIGHT

## 2024-10-24 ASSESSMENT — ACTIVITIES OF DAILY LIVING (ADL): HOME_MANAGEMENT_TIME_ENTRY: 16

## 2024-10-24 NOTE — PROGRESS NOTES
10/24/24 1448   Discharge Planning   Living Arrangements Spouse/significant other   Support Systems Spouse/significant other   Type of Residence Private residence   Home or Post Acute Services In home services   Type of Home Care Services Home OT;Home PT   Expected Discharge Disposition Home H   Does the patient need discharge transport arranged? Yes   Patient Choice   Provider Choice list and CMS website (https://medicare.gov/care-compare#search) for post-acute Quality and Resource Measure Data were provided and reviewed with: Family     PT [facility] 16, recommending high intensity therapy. Met with patient and spouse at bedside. Discussed AR vs home with PT. Pt lives in split level home with 7 step up to full bath and bedrooms or 6 steps down to family room and walk in shower. At this time pt prefers to go home with [facility] PT. She has not tried the stairs yet.  Pt and spouse will discuss discharge plan and she would like TCC team to follow up with her tomorrow after therapy.

## 2024-10-24 NOTE — CARE PLAN
The patient's goals for the shift include sleep, pain control    The clinical goals for the shift include pt to work with therapy, sit up in the recliner & pain control    Pt worked with therapy & is sitting in the recliner. Voids in the bsc & tolerates her diet. Complaints of pain & has reluctance to take OXY. Prefers to take tramadol, ortho PA made aware

## 2024-10-24 NOTE — PROGRESS NOTES
Physical Therapy    Physical Therapy Evaluation    Patient Name: Iona Hilton  MRN: [id number]  Today's Date: 10/24/2024   Time Calculation  Start Time: 0954  Stop Time: 1025  Time Calculation (min): 31 min  4101/4101-A    Assessment/Plan   PT Assessment  PT Assessment Results: Decreased strength, Decreased endurance, Impaired balance, Decreased mobility, Decreased safety awareness  Rehab Prognosis: Good  Evaluation/Treatment Tolerance: Patient tolerated treatment well  Medical Staff Made Aware: Yes  End of Session Communication: Bedside nurse  Assessment Comment: Pt is a 74 y.o. female admitted for Closed fracture of neck of right femur, initial encounter [S72.001A]  Closed displaced fracture of right femoral neck [S72.001A] on 10/22/2024. Pt below functional level and will benefit from skilled therapy during stay to improve overall functional mobility, strength, ROM, endurance and safety awareness. Upon discharge pt will benefit from high intensity therapy for continued improvement in functional mobility. Therapy will continue to follow and reassess each session.      End of Session Patient Position: Up in chair, Alarm on  IP OR SWING BED PT PLAN  Inpatient or Swing Bed: Inpatient  PT Plan  Treatment/Interventions: Bed mobility, Transfer training, Gait training, Stair training, Balance training, Strengthening, Therapeutic exercise, Therapeutic activity  PT Plan: Ongoing PT  PT Eval Only Reason: At baseline function  PT Frequency: Daily  PT Discharge Recommendations: High intensity level of continued care  Equipment Recommended upon Discharge: Wheeled walker  PT Recommended Transfer Status: Assist x1  PT - OK to Discharge: Yes    Subjective     Current Problem:  1. Closed fracture of neck of right femur, initial encounter  Case Request Operating Room: Hip Hemiarthroplasty    Case Request Operating Room: Hip Hemiarthroplasty      2. 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 Nino  Past Medical History Relevant to Rehab: GERD, HPV, HTN, hyponatremia  Co-Treatment: OT  Co-Treatment Reason: patient safety, 2 person assist  Prior to Session Communication: Bedside nurse  Patient Position Received: Bed, 3 rail up, Alarm on  Preferred Learning Style: verbal  General Comment: Patient agreeable to therapy.    Home Living:  Home Living  Type of Home: House  Lives With: Spouse  Home Layout: Multi-level (split level, 7 up and 4 down with rails)  Home Access: Stairs to enter with rails  Entrance Stairs-Number of Steps: may enter through back adn 1 step to enter, or through garage, 2 steps to enter  Bathroom Shower/Tub: Walk-in shower (on lower level)    Prior Level of Function:  Prior Function Per Pt/Caregiver Report  Level of [place]: Independent with ADLs and functional transfers    Precautions:  Precautions  LE Weight Bearing Status: Weight Bearing as Tolerated  Medical Precautions: Fall precautions  Post-Surgical Precautions: Right hip precautions (posterior)    Vital Signs:     Objective     Pain:  Pain Assessment  Pain Assessment: 0-10  0-10 (Numeric) Pain Score: 9  Pain Type: Surgical pain  Pain Location: Hip  Pain Orientation: Right  Pain Interventions: Cold applied (repositioning, RN notified of  pain)    Cognition:  Cognition  Overall Cognitive Status: Within Functional Limits  Orientation Level: Oriented X4 (anxious)    General Assessments:      Activity Tolerance  Endurance: Tolerates 10 - 20 min exercise with multiple rests                 Static Sitting Balance  Static Sitting-Comment/Number of Minutes: fair  Dynamic Sitting Balance  Dynamic Sitting-Comments: poor due to pain  Static Standing Balance  Static Standing-Comment/Number of Minutes: fair  Dynamic Standing Balance  Dynamic Standing-Comments: fair-    Functional Assessments:     Bed Mobility  Bed Mobility: Yes  Bed Mobility 1  Bed Mobility 1: Supine to sitting  Level of Assistance 1: Maximum assistance  Transfers  Transfer: Yes  Transfer 1  Transfer From 1: Sit to  Transfer to 1: Stand  Technique 1: Sit to stand, Stand to sit  Transfer Device 1: Walker, Gait belt  Transfer Level of Assistance 1: Moderate assistance, +2  Transfers 2  Transfer From 2: Bed to  Transfer to 2:  (BSC)  Technique 2: Stand pivot, To left  Transfer Device 2: Walker, Gait belt  Transfer Level of Assistance 2: Moderate assistance, +2  Transfers 3  Transfer From 3: Sit to  Transfer to 3: Stand  Technique 3: Sit to stand, Stand to sit  Transfer Device 3: Walker, Gait belt  Transfer Level of Assistance 3: Moderate assistance, +2             Extremity/Trunk Assessments:        LLE   LLE : Within Functional Limits    Outcome Measures:     AMPAC Basic Mobility  Turning from your back to your side while in a flat bed without using bedrails: A lot  Moving from lying on your back to sitting on the side of a flat bed without using bedrails: A lot  Moving to and from bed to chair (including a wheelchair): A lot  Standing up from a chair using your arms (e.g. wheelchair or bedside chair): A lot  To walk in hospital room: A lot  Climbing 3-5 steps with railing: A lot  Basic Mobility - Total Score: 12          Goals:  Encounter Problems       Encounter Problems (Active)       PT  Problem       Pt will demonstrate mod I for all bed mobility   (Progressing)       Start:  10/24/24    Expected End:  11/07/24            Pt will demonstrate mod I for all transfers with WW  (Progressing)       Start:  10/24/24    Expected End:  11/07/24            Pt will ambulate 250 ft with WW.  (Progressing)       Start:  10/24/24    Expected End:  11/07/24            Pt will be able to negotiate 2 steps with rail.  (Progressing)       Start:  10/24/24    Expected End:  11/07/24               Pain - Adult            Education Documentation  Precautions, taught by Padmaja Cervantes, PT at 10/24/2024  3:13 PM.  Learner: Patient  Readiness: Acceptance  Method: Explanation  Response: Verbalizes Understanding, Needs Reinforcement    Body Mechanics, taught by Padmaja Cervantes PT at 10/24/2024  3:13 PM.  Learner: Patient  Readiness: Acceptance  Method: Explanation  Response: Verbalizes Understanding, Needs Reinforcement    Mobility Training, taught by Padmaja Cervantes PT at 10/24/2024  3:13 PM.  Learner: Patient  Readiness: Acceptance  Method: Explanation  Response: Verbalizes Understanding, Needs Reinforcement    Education Comments  No comments found.

## 2024-10-24 NOTE — PROGRESS NOTES
"Iona Hilton is a 74 y.o. female on day 1 of admission presenting with Closed fracture of neck of right femur, initial encounter.    Subjective   Ms. Hilton is sitting up in bed, feels good today, minimal right hip pain.  She is looking forward to getting up and working with therapy this morning.  She is voiding without any issues.       Objective     Physical Exam  Vitals reviewed.   HENT:      Head: Normocephalic.      Mouth/Throat:      Mouth: Mucous membranes are moist.      Pharynx: Oropharynx is clear.   Eyes:      Extraocular Movements: Extraocular movements intact.   Cardiovascular:      Rate and Rhythm: Normal rate.   Pulmonary:      Effort: Pulmonary effort is normal.   Abdominal:      Palpations: Abdomen is soft.   Musculoskeletal:      Comments: Right hip dressing is dry and intact.  light touch sensation is intact, ankle dorsiflexion/plantar flexion is intact. DP 2+/2 palpable b   Skin:     General: Skin is warm and dry.      Capillary Refill: Capillary refill takes less than 2 seconds.   Neurological:      General: No focal deficit present.      Mental Status: She is alert. Mental status is at baseline.   Psychiatric:         Mood and Affect: Mood normal.         Last Recorded Vitals  Blood pressure 133/69, pulse 99, temperature 37.7 °C (99.9 °F), temperature source Temporal, resp. rate 16, height 1.702 m (5' 7\"), weight 54.4 kg (120 lb), SpO2 100%.  Intake/Output last 3 Shifts:  I/O last 3 completed shifts:  In: 1700 (31.2 mL/kg) [I.V.:1450 (26.6 mL/kg); IV Piggyback:250]  Out: 100 (1.8 mL/kg) [Blood:100]  Weight: 54.4 kg     Relevant Results      Scheduled medications  acetaminophen, 650 mg, oral, q6h  cefTRIAXone, 1 g, intravenous, q24h  enoxaparin, 40 mg, subcutaneous, Daily  lidocaine, 2 patch, transdermal, Daily  polyethylene glycol, 17 g, oral, Daily      Continuous medications  lactated Ringer's, 50 mL/hr, Last Rate: 50 mL/hr (10/23/24 1753)  oxygen, 2 L/min, Last Rate: Stopped (10/23/24 " 6616)      PRN medications  PRN medications: acetaminophen, bisacodyl, bisacodyl, diphenhydrAMINE, metoclopramide **OR** metoclopramide, morphine, naloxone, ondansetron ODT **OR** ondansetron, oxyCODONE, oxyCODONE  Results for orders placed or performed during the hospital encounter of 10/22/24 (from the past 24 hours)   Basic metabolic panel   Result Value Ref Range    Glucose 114 (H) 74 - 99 mg/dL    Sodium 139 136 - 145 mmol/L    Potassium 4.1 3.5 - 5.3 mmol/L    Chloride 106 98 - 107 mmol/L    Bicarbonate 27 21 - 32 mmol/L    Anion Gap 10 10 - 20 mmol/L    Urea Nitrogen 9 6 - 23 mg/dL    Creatinine 0.63 0.50 - 1.05 mg/dL    eGFR >90 >60 mL/min/1.73m*2    Calcium 8.6 8.6 - 10.3 mg/dL   Magnesium   Result Value Ref Range    Magnesium 1.89 1.60 - 2.40 mg/dL   CBC   Result Value Ref Range    WBC 8.6 4.4 - 11.3 x10*3/uL    nRBC 0.0 0.0 - 0.0 /100 WBCs    RBC 3.16 (L) 4.00 - 5.20 x10*6/uL    Hemoglobin 9.8 (L) 12.0 - 16.0 g/dL    Hematocrit 29.6 (L) 36.0 - 46.0 %    MCV 94 80 - 100 fL    MCH 31.0 26.0 - 34.0 pg    MCHC 33.1 32.0 - 36.0 g/dL    RDW 13.2 11.5 - 14.5 %    Platelets 156 150 - 450 x10*3/uL               XR pelvis 1-2 views    Result Date: 10/23/2024  Interpreted By:  Tamiko Montiel, STUDY: Single view pelvis.   INDICATION: Signs/Symptoms:post op R hemiarthroplasty.   COMPARISON: 10/22/2024.   ACCESSION NUMBER(S): [id number]   ORDERING CLINICIAN: LEONOR CRAMER   FINDINGS: No acute fracture or malalignment. Status post right hip hemiarthroplasty. Hardware is intact without perihardware fractures or lucencies. Left hip joint space is maintained. Postsurgical soft tissue gas in the left gluteal region.       1. Right hip hemiarthroplasty without hardware complication.   MACRO: None.   Signed by: Tamiko Montiel 10/23/2024 7:26 PM Dictation workstation:   [id number]    ECG 12 lead    Result Date: 10/23/2024  Normal sinus rhythm Normal ECG No previous ECGs available    CT hip right wo IV  contrast    Result Date: 10/23/2024  Interpreted By:  Omar Sena, STUDY: CT HIP RIGHT WO IV CONTRAST;  10/22/2024 11:47 pm   INDICATION: Signs/Symptoms:preop planning.   COMPARISON: Same-day hip radiographs   ACCESSION NUMBER(S): [id number]   ORDERING CLINICIAN: ROGERS SINGH   TECHNIQUE: Axial CT images of the right hip with coronal and sagittal reconstructed images obtained without intravenous contrast.   FINDINGS:   There is an acute fracture of the right femoral neck mildly comminuted and impacted. Fracture demonstrates anterosuperior displacement by approximately 1/2 bone width as well as mild apex-anterior angulation and varus angulation. There is soft tissue swelling about the fracture site.   Left parasymphyseal inferior pubic ramus bone island. Large rectal stool volume.       1. Acute impacted angulated and displaced right femoral neck fracture. 2. Large rectal stool volume. Please correlate for fecal impaction.   Signed by: Omar Sena 10/23/2024 12:43 AM Dictation workstation:   [id number]    XR chest 1 view    Result Date: 10/22/2024  Interpreted By:  Adolfo Davis, STUDY: XR CHEST 1 VIEW;  10/22/2024 10:55 pm   INDICATION: Signs/Symptoms:preop.   COMPARISON: 2/13/2024   ACCESSION NUMBER(S): [id number]   ORDERING CLINICIAN: ROGERS SINGH   FINDINGS: The cardiac silhouette is normal in size. No focal airspace consolidation or pleural effusion. No pneumothorax. Scoliotic curvature of the thoracolumbar spine.       No airspace consolidation or pleural effusion.   MACRO: None   Signed by: Adolfo Davis 10/22/2024 11:48 PM Dictation workstation:   [id number]    XR hip right with pelvis when performed 2 or 3 views    Result Date: 10/22/2024  Interpreted By:  Adolfo Davis, STUDY: XR HIP RIGHT WITH PELVIS WHEN PERFORMED 2 OR 3 VIEWS; ;  10/22/2024 10:55 pm   INDICATION: Signs/Symptoms:fall, ttp.     COMPARISON: None.   ACCESSION NUMBER(S): [id number]   ORDERING CLINICIAN:  DAMIAN MAGUIRELANT   FINDINGS: There is acute right femoral neck fracture. There is lateral and superior displacement of distal fracture fragment. Levoconvex curvature of the imaged lower lumbar spine. There is osteopenia.       Acute right femoral neck fracture.     MACRO: None   Signed by: Adolfo Davis 10/22/2024 11:47 PM Dictation workstation:   [id number]                Assessment/Plan   Assessment & Plan  Closed fracture of neck of right femur, initial encounter    Neutrophilic leukocytosis    Acute cystitis without hematuria    POD #1 s/p right hip hemiarthroplasty  Continue PT/OT, WBAT right  LE  Using incentive spirometer   Reviewed am labs  Multimodal pain regimen  Surgical hip dressing to be removed on post op day #7  VTE prophylaxis: lovenox 40mg daily X 30 days  Dispo: TBD after PT eval  Follow up with Dr. Nino in 2 weeks         I spent 25 minutes in the professional and overall care of this patient.      Aurora Woodward PA-C

## 2024-10-24 NOTE — PROGRESS NOTES
Occupational Therapy  Evaluation    Patient Name: Iona Hilton  MRN: [id number]  Today's Date: 10/24/2024  Time Calculation  Start Time: 0953  Stop Time: 1024  Time Calculation (min): 31 min  4101/4101-A    Assessment  IP OT Assessment  OT Assessment: Patient demonstrates decreased independence with self care, decreased independence with functional transfers, decreased endurance, decreased strength and decreased balance.  Patient will benefit from skilled OT to address deficits.  Anticiapte patient will benefit from high intensity therapy post hospital stay as patient below baseline level of function.  Prognosis: Good  Barriers to Discharge: None  Evaluation/Treatment Tolerance: Patient tolerated treatment well  Medical Staff Made Aware: Yes  End of Session Communication: Bedside nurse  End of Session Patient Position: Up in chair, Alarm on    Plan:  Treatment Interventions: ADL retraining, Functional transfer training, Endurance training, Patient/family training  OT Frequency: Daily  OT Discharge Recommendations: High intensity level of continued care  Equipment Recommended upon Discharge: Wheeled walker  OT Recommended Transfer Status: Moderate assist, Assist of 2  OT - OK to Discharge: Yes (to next level of care when medically cleared by physician)    Subjective     Current Problem:  1. Closed fracture of neck of right femur, initial encounter  Case Request Operating Room: Hip Hemiarthroplasty    Case Request Operating Room: Hip Hemiarthroplasty      2. Closed displaced fracture of right femoral neck  Case Request Operating Room: Hip Hemiarthroplasty    Case Request Operating Room: Hip Hemiarthroplasty          General:  General  Reason for Referral: impaired self care  Referred By: Dr. Nino  Past Medical History Relevant to Rehab: GERD, HPV, HTN, hyponatremia  Co-Treatment: PT  Co-Treatment Reason: for discharge planning  Prior to Session Communication: Bedside nurse  Patient Position Received: Bed, 3 rail  up, Alarm on  Preferred Learning Style: verbal  General Comment: Patient agreeable to therapy.    Precautions:  LE Weight Bearing Status: Weight Bearing as Tolerated  Medical Precautions: Fall precautions  Post-Surgical Precautions: Right hip precautions (posterior)      Pain:  Pain Assessment  Pain Assessment: 0-10  0-10 (Numeric) Pain Score: 9  Pain Type: Surgical pain  Pain Location: Hip  Pain Orientation: Right  Pain Interventions: Medication (See MAR), Repositioned, Cold applied    Objective     Cognition:  Orientation Level: Oriented X4  Insight: Mild        Home Living:  Type of Home: House  Lives With: Spouse  Home Layout: Multi-level (split level, 7 up and 4 down with rails)  Home Access: Stairs to enter with rails  Entrance Stairs-Number of Steps: 2  Bathroom Shower/Tub: Walk-in shower (on lower level)  Home Living Comments: Patient does not have any DME     Prior Function:  Level of [place]: Independent with ADLs and functional transfers      ADL:  LE Dressing Assistance: Maximal  Toileting Assistance with Device: Moderate  Toileting Deficit: Bedside commode    Activity Tolerance:  Endurance: Tolerates 10 - 20 min exercise with multiple rests    Bed Mobility/Transfers:   Bed Mobility  Bed Mobility: Yes  Bed Mobility 1  Bed Mobility 1: Supine to sitting  Level of Assistance 1: Maximum assistance  Bed Mobility Comments 1: patient needed cues and extra time  Transfers  Transfer: Yes  Transfer 1  Transfer From 1: Sit to  Transfer to 1: Stand  Technique 1: Sit to stand, Stand to sit  Transfer Device 1: Walker, Gait belt  Transfer Level of Assistance 1: Moderate assistance, +2  Transfers 2  Transfer From 2: Bed to  Transfer to 2:  (BSC)  Technique 2: Stand pivot, To left  Transfer Device 2: Walker, Gait belt  Transfer Level of Assistance 2: Moderate assistance, +2  Transfers 3  Transfer From 3: Sit to  Transfer to 3: Stand  Technique 3: Sit to stand, Stand to sit  Transfer Device 3: Walker, Gait  belt  Transfer Level of Assistance 3: Moderate assistance, +2  Trials/Comments 3: stood from commode and brought chair behind patient      Strength:  Strength Comments: WFL    Outcome Measures: [facility] Daily Activity  Putting on and taking off regular lower body clothing: A lot  Bathing (including washing, rinsing, drying): A lot  Putting on and taking off regular upper body clothing: A little  Toileting, which includes using toilet, bedpan or urinal: A lot  Taking care of personal grooming such as brushing teeth: A little  Eating Meals: None  Daily Activity - Total Score: 16        EDUCATION:  Education  Individual(s) Educated: Patient  Education Provided: Fall precautons, Risk and benefits of OT discussed with patient or other  Plan of Care Discussed and Agreed Upon: yes  Patient Response to Education: Patient/Caregiver Verbalized Understanding of Information      Goals:   Encounter Problems       Encounter Problems (Active)       OT Goals       Patient will complete functional transfers with SBA. (Progressing)       Start:  10/24/24    Expected End:  11/07/24            Patient will complete LE dressing with min A. (Progressing)       Start:  10/24/24    Expected End:  11/07/24            Patient will complete toileting with min A. (Progressing)       Start:  10/24/24    Expected End:  11/07/24

## 2024-10-25 LAB — BACTERIA UR CULT: ABNORMAL

## 2024-10-25 PROCEDURE — 2500000001 HC RX 250 WO HCPCS SELF ADMINISTERED DRUGS (ALT 637 FOR MEDICARE OP): Performed by: PHYSICIAN ASSISTANT

## 2024-10-25 PROCEDURE — 97535 SELF CARE MNGMENT TRAINING: CPT | Mod: GO,CO

## 2024-10-25 PROCEDURE — 1100000001 HC PRIVATE ROOM DAILY

## 2024-10-25 PROCEDURE — 2500000004 HC RX 250 GENERAL PHARMACY W/ HCPCS (ALT 636 FOR OP/ED): Performed by: PHYSICIAN ASSISTANT

## 2024-10-25 PROCEDURE — 97110 THERAPEUTIC EXERCISES: CPT | Mod: GP,CQ

## 2024-10-25 PROCEDURE — 2500000005 HC RX 250 GENERAL PHARMACY W/O HCPCS: Performed by: PHYSICIAN ASSISTANT

## 2024-10-25 PROCEDURE — 0SRR0J9 REPLACEMENT OF RIGHT HIP JOINT, FEMORAL SURFACE WITH SYNTHETIC SUBSTITUTE, CEMENTED, OPEN APPROACH: ICD-10-PCS | Performed by: ORTHOPAEDIC SURGERY

## 2024-10-25 PROCEDURE — 97116 GAIT TRAINING THERAPY: CPT | Mod: GP,CQ

## 2024-10-25 PROCEDURE — 97530 THERAPEUTIC ACTIVITIES: CPT | Mod: GO,CO

## 2024-10-25 PROCEDURE — 2500000004 HC RX 250 GENERAL PHARMACY W/ HCPCS (ALT 636 FOR OP/ED): Performed by: NURSE PRACTITIONER

## 2024-10-25 ASSESSMENT — COGNITIVE AND FUNCTIONAL STATUS - GENERAL
DAILY ACTIVITIY SCORE: 21
WALKING IN HOSPITAL ROOM: A LITTLE
DRESSING REGULAR LOWER BODY CLOTHING: A LITTLE
TURNING FROM BACK TO SIDE WHILE IN FLAT BAD: A LITTLE
HELP NEEDED FOR BATHING: A LITTLE
MOVING TO AND FROM BED TO CHAIR: A LITTLE
STANDING UP FROM CHAIR USING ARMS: A LITTLE
MOBILITY SCORE: 17
CLIMB 3 TO 5 STEPS WITH RAILING: A LOT
TOILETING: A LITTLE
MOVING FROM LYING ON BACK TO SITTING ON SIDE OF FLAT BED WITH BEDRAILS: A LITTLE

## 2024-10-25 ASSESSMENT — PAIN - FUNCTIONAL ASSESSMENT
PAIN_FUNCTIONAL_ASSESSMENT: 0-10

## 2024-10-25 ASSESSMENT — PAIN SCALES - GENERAL
PAINLEVEL_OUTOF10: 7
PAINLEVEL_OUTOF10: 7
PAINLEVEL_OUTOF10: 2
PAINLEVEL_OUTOF10: 7
PAINLEVEL_OUTOF10: 4

## 2024-10-25 ASSESSMENT — PAIN SCALES - PAIN ASSESSMENT IN ADVANCED DEMENTIA (PAINAD): TOTALSCORE: MEDICATION (SEE MAR)

## 2024-10-25 ASSESSMENT — ACTIVITIES OF DAILY LIVING (ADL): HOME_MANAGEMENT_TIME_ENTRY: 30

## 2024-10-25 ASSESSMENT — PAIN DESCRIPTION - ORIENTATION: ORIENTATION: RIGHT

## 2024-10-25 ASSESSMENT — PAIN DESCRIPTION - LOCATION
LOCATION: HIP
LOCATION: HIP

## 2024-10-25 NOTE — CARE PLAN
Problem: Pain  Goal: Turns in bed with improved pain control throughout the shift  Outcome: Progressing  Goal: Walks with improved pain control throughout the shift  Outcome: Progressing  Goal: Performs ADL's with improved pain control throughout shift  Outcome: Progressing  Goal: Free from opioid side effects throughout the shift  Outcome: Progressing  Goal: Free from acute confusion related to pain meds throughout the shift  Outcome: Progressing     Problem: Pain - Adult  Goal: Verbalizes/displays adequate comfort level or baseline comfort level  Outcome: Progressing     Problem: Safety - Adult  Goal: Free from fall injury  Outcome: Progressing     Problem: Discharge Planning  Goal: Discharge to home or other facility with appropriate resources  Outcome: Progressing     Problem: Chronic Conditions and Co-morbidities  Goal: Patient's chronic conditions and co-morbidity symptoms are monitored and maintained or improved  Outcome: Progressing   The patient's goals for the shift include sleep, pain control    The clinical goals for the shift include Pain management    Patient progressing towards goals. Patient is alert and oriented. Remains safe and free of falls/injury this shift. Patient up with 1 assist and walker from recliner/chair to bed. Pain managed with routine and PRN pain meds. Neurovascular checks unchanged this shift. Dressing to right hip CDI. VSS.

## 2024-10-25 NOTE — PROGRESS NOTES
Met with patient and her family at the bedside with patient permission. Discussed acute rehab at length and she is considering. She would like to see how she does on stairs tomorrow before making final decision as she felt she did much better today. She would like a referral to [facility] Acute rehab--requested [facility] send it.    Christi Copeland RN

## 2024-10-25 NOTE — PROGRESS NOTES
"Iona Hilton is a 74 y.o. female on day 2 of admission presenting with Closed fracture of neck of right femur, initial encounter.    Subjective   Ms. Hilton is sitting up in bed waiting for her breakfast to arrive.  She describes mild right hip discomfort which is relieved with tramadol and Tylenol.  She was able to ambulate to her chair during therapy yesterday.       Objective     Physical Exam  Vitals reviewed.   HENT:      Head: Normocephalic.      Mouth/Throat:      Mouth: Mucous membranes are moist.      Pharynx: Oropharynx is clear.   Eyes:      Extraocular Movements: Extraocular movements intact.   Cardiovascular:      Rate and Rhythm: Normal rate.   Pulmonary:      Effort: Pulmonary effort is normal.   Abdominal:      Palpations: Abdomen is soft.   Musculoskeletal:      Comments: Right hip dressing is dry and intact.  light touch sensation is intact, ankle dorsiflexion/plantar flexion is intact. DP 2+/2 palpable     Skin:     General: Skin is warm and dry.      Capillary Refill: Capillary refill takes less than 2 seconds.   Neurological:      General: No focal deficit present.      Mental Status: She is alert. Mental status is at baseline.   Psychiatric:         Mood and Affect: Mood normal.         Last Recorded Vitals  Blood pressure 121/71, pulse 95, temperature 36.1 °C (97 °F), temperature source Temporal, resp. rate 17, height 1.702 m (5' 7\"), weight 54.4 kg (120 lb), SpO2 97%.  Intake/Output last 3 Shifts:  No intake/output data recorded.    Relevant Results      Scheduled medications  acetaminophen, 650 mg, oral, q6h  cefTRIAXone, 1 g, intravenous, q24h  enoxaparin, 40 mg, subcutaneous, Daily  lidocaine, 2 patch, transdermal, Daily  lisinopril, 5 mg, oral, Daily  polyethylene glycol, 17 g, oral, Daily      Continuous medications  oxygen, 2 L/min, Last Rate: Stopped (10/23/24 2984)      PRN medications  PRN medications: acetaminophen, bisacodyl, bisacodyl, diphenhydrAMINE, metoclopramide **OR** " metoclopramide, morphine, naloxone, ondansetron ODT **OR** ondansetron, traMADol, traMADol  No results found for this or any previous visit (from the past 24 hours).              XR pelvis 1-2 views    Result Date: 10/23/2024  Interpreted By:  Tamiko Montiel, STUDY: Single view pelvis.   INDICATION: Signs/Symptoms:post op R hemiarthroplasty.   COMPARISON: 10/22/2024.   ACCESSION NUMBER(S): [id number]   ORDERING CLINICIAN: LEONOR CRAMER   FINDINGS: No acute fracture or malalignment. Status post right hip hemiarthroplasty. Hardware is intact without perihardware fractures or lucencies. Left hip joint space is maintained. Postsurgical soft tissue gas in the left gluteal region.       1. Right hip hemiarthroplasty without hardware complication.   MACRO: None.   Signed by: Tamiko Montiel 10/23/2024 7:26 PM Dictation workstation:   [id number]    ECG 12 lead    Result Date: 10/23/2024  Normal sinus rhythm Normal ECG No previous ECGs available    CT hip right wo IV contrast    Result Date: 10/23/2024  Interpreted By:  Omar Sena, STUDY: CT HIP RIGHT WO IV CONTRAST;  10/22/2024 11:47 pm   INDICATION: Signs/Symptoms:preop planning.   COMPARISON: Same-day hip radiographs   ACCESSION NUMBER(S): [id number]   ORDERING CLINICIAN: ROGERS SINGH   TECHNIQUE: Axial CT images of the right hip with coronal and sagittal reconstructed images obtained without intravenous contrast.   FINDINGS:   There is an acute fracture of the right femoral neck mildly comminuted and impacted. Fracture demonstrates anterosuperior displacement by approximately 1/2 bone width as well as mild apex-anterior angulation and varus angulation. There is soft tissue swelling about the fracture site.   Left parasymphyseal inferior pubic ramus bone island. Large rectal stool volume.       1. Acute impacted angulated and displaced right femoral neck fracture. 2. Large rectal stool volume. Please correlate for fecal impaction.   Signed by:  Omar Nathen 10/23/2024 12:43 AM Dictation workstation:   [id number]    XR chest 1 view    Result Date: 10/22/2024  Interpreted By:  Adolfo Davis, STUDY: XR CHEST 1 VIEW;  10/22/2024 10:55 pm   INDICATION: Signs/Symptoms:preop.   COMPARISON: 2/13/2024   ACCESSION NUMBER(S): [id number]   ORDERING CLINICIAN: ROGERS SINGH   FINDINGS: The cardiac silhouette is normal in size. No focal airspace consolidation or pleural effusion. No pneumothorax. Scoliotic curvature of the thoracolumbar spine.       No airspace consolidation or pleural effusion.   MACRO: None   Signed by: Adolfo Davis 10/22/2024 11:48 PM Dictation workstation:   [id number]    XR hip right with pelvis when performed 2 or 3 views    Result Date: 10/22/2024  Interpreted By:  Adolfo Davis, STUDY: XR HIP RIGHT WITH PELVIS WHEN PERFORMED 2 OR 3 VIEWS; ;  10/22/2024 10:55 pm   INDICATION: Signs/Symptoms:fall, ttp.     COMPARISON: None.   ACCESSION NUMBER(S): [id number]   ORDERING CLINICIAN: DAMIAN MONROY   FINDINGS: There is acute right femoral neck fracture. There is lateral and superior displacement of distal fracture fragment. Levoconvex curvature of the imaged lower lumbar spine. There is osteopenia.       Acute right femoral neck fracture.     MACRO: None   Signed by: Adolfo Davis 10/22/2024 11:47 PM Dictation workstation:   [id number]              Assessment/Plan   Assessment & Plan  Closed fracture of neck of right femur, initial encounter    Neutrophilic leukocytosis    Acute cystitis without hematuria    POD #2 s/p right hip hemiarthroplasty  Continue PT/OT, WBAT right  LE  Using incentive spirometer   Reviewed am labs  Multimodal pain regimen  Surgical hip dressing to be removed on post op day #7  VTE prophylaxis: lovenox 40mg daily X 30 days  Dispo: home/SNF/AR depending on progression in therapy today  Follow up with Dr. Nino in 2 weeks          I spent 25 minutes in the professional and overall care of this  patient.      Aurora Woodward PA-C

## 2024-10-25 NOTE — PROGRESS NOTES
Physical Therapy    Physical Therapy    Physical Therapy Treatment    Patient Name: Iona Hilton  MRN: [id number]  Today's Date: 10/25/2024  Time Calculation  Start Time: 0916  Stop Time: 0946  Time Calculation (min): 30 min     4101/4101-A    Assessment/Plan   PT Assessment  PT Assessment Results: Decreased strength, Decreased endurance, Impaired balance, Decreased mobility, Decreased safety awareness  End of Session Communication: Bedside nurse  End of Session Patient Position: Up in chair, Alarm on  PT Plan  Inpatient/Swing Bed or Outpatient: Inpatient  PT Plan  Treatment/Interventions: Bed mobility, Transfer training, Gait training, Stair training, Balance training, Strengthening, Therapeutic exercise, Therapeutic activity  PT Plan: Ongoing PT  PT Eval Only Reason: At baseline function  PT Frequency: Daily  PT Discharge Recommendations: High intensity level of continued care  Equipment Recommended upon Discharge: Wheeled walker  PT Recommended Transfer Status: Assist x1  PT - OK to Discharge: Yes     10/25/24 0916   PT  Visit   PT Received On 10/25/24   Response to Previous Treatment Patient with no complaints from previous session.   General   Reason for Referral Impaired Mobility   General Comment Patient agreeable to therapy.   Pain Assessment   Pain Assessment 0-10   0-10 (Numeric) Pain Score 7  (with mobility)   Pain Type Surgical pain   Pain Location Hip   Pain Orientation Right   Cognition   Overall Cognitive Status WFL   Therapeutic Exercise   Therapeutic Exercise Performed Yes   Therapeutic Exercise Activity 1 AP,  QS,  GS,  Hip Abduction,  Marchin,  LAQ,  Ball Squeezes  x 20 B with emphasis on R LE   Bed Mobility   Bed Mobility Yes   Bed Mobility 1   Bed Mobility 1 Supine to sitting;Sitting to supine   Level of Assistance 1 Contact guard   Ambulation/Gait Training   Ambulation/Gait Training Performed Yes   Ambulation/Gait Training 1   Surface 1 Level tile   Device 1 Rolling walker   Gait Support  Devices Gait belt   Assistance 1 Contact guard;Minimum assistance   Quality of Gait 1 Inconsistent stride length;NBOS;Decreased step length;Festinating;Diminished heel strike   Comments/Distance (ft) 1 15,45  (Patient needing verbal cues for sequencing and to keep heel on the floor on step through)   Transfers   Transfer Yes   Transfer 1   Technique 1 Sit to stand;Stand to sit   Transfer Device 1 Walker;Gait belt   Transfer Level of Assistance 1 Contact guard   Trials/Comments 1 x4   PT Assessment   PT Assessment Results Decreased strength;Decreased endurance;Impaired balance;Decreased mobility;Decreased safety awareness   End of Session Communication Bedside nurse   End of Session Patient Position Up in chair;Alarm on   PT Plan   Inpatient/Swing Bed or Outpatient Inpatient     Outcome Measures:  [facility] Basic Mobility  Turning from your back to your side while in a flat bed without using bedrails: A little  Moving from lying on your back to sitting on the side of a flat bed without using bedrails: A little  Moving to and from bed to chair (including a wheelchair): A little  Standing up from a chair using your arms (e.g. wheelchair or bedside chair): A little  To walk in hospital room: A little  Climbing 3-5 steps with railing: A lot  Basic Mobility - Total Score: 17                             EDUCATION:     Education Documentation  No documentation found.  Education Comments  No comments found.        GOALS:  Encounter Problems       Encounter Problems (Active)       PT Problem       Pt will demonstrate mod I for all bed mobility   (Progressing)       Start:  10/24/24    Expected End:  11/07/24            Pt will demonstrate mod I for all transfers with WW  (Progressing)       Start:  10/24/24    Expected End:  11/07/24            Pt will ambulate 250 ft with WW.  (Progressing)       Start:  10/24/24    Expected End:  11/07/24            Pt will be able to negotiate 2 steps with rail.  (Progressing)       Start:   10/24/24    Expected End:  11/07/24               Pain - Adult

## 2024-10-25 NOTE — CARE PLAN
Problem: Pain  Goal: Takes deep breaths with improved pain control throughout the shift  Outcome: Progressing     Problem: Pain - Adult  Goal: Verbalizes/displays adequate comfort level or baseline comfort level  Outcome: Progressing     Problem: Safety - Adult  Goal: Free from fall injury  Outcome: Progressing     Problem: Discharge Planning  Goal: Discharge to home or other facility with appropriate resources  Outcome: Progressing     Problem: Chronic Conditions and Co-morbidities  Goal: Patient's chronic conditions and co-morbidity symptoms are monitored and maintained or improved  Outcome: Progressing

## 2024-10-25 NOTE — PROGRESS NOTES
Physical Therapy    Physical Therapy    Physical Therapy Treatment    Patient Name: Iona iHlton  MRN: [id number]  Today's Date: 10/25/2024  Time Calculation  Start Time: 1104  Stop Time: 1135  Time Calculation (min): 31 min     4101/4101-A    Assessment/Plan   PT Assessment  PT Assessment Results: Decreased strength, Decreased endurance, Impaired balance, Decreased mobility, Decreased safety awareness  End of Session Communication: Bedside nurse  End of Session Patient Position: Up in chair, Alarm on  PT Plan  Inpatient/Swing Bed or Outpatient: Inpatient  PT Plan  Treatment/Interventions: Bed mobility, Transfer training, Gait training, Stair training, Balance training, Strengthening, Therapeutic exercise, Therapeutic activity  PT Plan: Ongoing PT  PT Eval Only Reason: At baseline function  PT Frequency: Daily  PT Discharge Recommendations: High intensity level of continued care  Equipment Recommended upon Discharge: Wheeled walker  PT Recommended Transfer Status: Assist x1  PT - OK to Discharge: Yes     10/25/24 1104   PT  Visit   PT Received On 10/25/24   Response to Previous Treatment Patient with no complaints from previous session.   General   Reason for Referral Impaired Mobility Ben artroplasty R LE   General Comment Patient agreeable to therapy.  ( present for second session)   Precautions   LE Weight Bearing Status Weight Bearing as Tolerated   Post-Surgical Precautions Right hip precautions   Precautions Comment Patient voiced understanding   Pain Assessment   Pain Assessment 0-10   0-10 (Numeric) Pain Score 7   Pain Type Surgical pain   Pain Location Hip   Pain Orientation Right   Cognition   Overall Cognitive Status WFL   Orientation Level Oriented X4   Therapeutic Exercise   Therapeutic Exercise Performed Yes   Therapeutic Exercise Activity 1 AP,  QS,  GS,  Hip Abduction,  Marchin,  LAQ,  Ball Squeezes  x 20 B with emphasis on R LE   Bed Mobility   Bed Mobility Yes   Bed Mobility 1   Bed  Mobility 1 Supine to sitting;Sitting to supine   Level of Assistance 1 Contact guard   Ambulation/Gait Training   Ambulation/Gait Training Performed Yes   Ambulation/Gait Training 1   Surface 1 Level tile   Device 1 Rolling walker   Gait Support Devices Gait belt   Assistance 1 Contact guard   Quality of Gait 1 Inconsistent stride length;NBOS;Decreased step length;Festinating;Diminished heel strike   Comments/Distance (ft) 1 15, 70  (Patient demos improved tolerance to gait improved  stability and sequencing)   Transfers   Transfer Yes   Transfer 1   Technique 1 Sit to stand;Stand to sit   Transfer Device 1 Walker;Gait belt   Transfer Level of Assistance 1 Contact guard   Trials/Comments 1 x4   Stairs   Stairs Yes   Stairs   Rails 1 Right   Device 1 Single point cane   Support Devices 1 Gait belt   Assistance 1 Moderate assistance  (Patient fearful needing extensive encouragement  was able to complete task)   Comment/Number of Steps 1 3  (Patient needing continued practice)   Activity Tolerance   Endurance Endurance does not limit participation in activity   PT Assessment   PT Assessment Results Decreased strength;Decreased endurance;Impaired balance;Decreased mobility;Decreased safety awareness   End of Session Communication Bedside nurse   End of Session Patient Position Up in chair;Alarm on   PT Plan   Inpatient/Swing Bed or Outpatient Inpatient     Outcome Measures:  [facility] Basic Mobility  Turning from your back to your side while in a flat bed without using bedrails: A little  Moving from lying on your back to sitting on the side of a flat bed without using bedrails: A little  Moving to and from bed to chair (including a wheelchair): A little  Standing up from a chair using your arms (e.g. wheelchair or bedside chair): A little  To walk in hospital room: A little  Climbing 3-5 steps with railing: A lot  Basic Mobility - Total Score: 17                             EDUCATION:     Education Documentation  No  documentation found.  Education Comments  No comments found.        GOALS:  Encounter Problems       Encounter Problems (Active)       PT Problem       Pt will demonstrate mod I for all bed mobility   (Progressing)       Start:  10/24/24    Expected End:  11/07/24            Pt will demonstrate mod I for all transfers with WW  (Progressing)       Start:  10/24/24    Expected End:  11/07/24            Pt will ambulate 250 ft with WW.  (Progressing)       Start:  10/24/24    Expected End:  11/07/24            Pt will be able to negotiate 2 steps with rail.  (Progressing)       Start:  10/24/24    Expected End:  11/07/24               Pain - Adult

## 2024-10-25 NOTE — PROGRESS NOTES
Occupational Therapy    OT Treatment    Patient Name: Iona Hilton  MRN: [id number]  Today's Date: 10/25/2024  Time Calculation  Start Time: 0859  Stop Time: 0958  Time Calculation (min): 59 min       4101/4101-A    Assessment:  OT Assessment: Patient was able to participate in O.T. this day and was pleased with her progress from yesterday. Patient could, however, benefit from continued O.T. to work on functional mobility and ADL tasks.       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 Nino  Co-Treatment: PT  Co-Treatment Reason: safety.  Prior to Session Communication: Bedside nurse  Patient Position Received: Bed, 3 rail up, Alarm on  General Comment: Patient agreeable to working with therapy.    Vital Signs:       Pain:  Pain Assessment  Pain Assessment: 0-10  0-10 (Numeric) Pain Score: 7  Pain Type:  (Patient had no pain when sitting, 7/10 when walking using wheeled walker.)  Pain Location: Hip  Pain Orientation: Right  Objective      Activities of Daily Living:    Grooming  Grooming Level of Assistance: Contact guard  Grooming Where Assessed: Standing sinkside  Grooming Comments: Patient required verbal cues to lower her R heel to the floor when standing at the sink.  Encouraged patient to stand as normally as possible, which she was able to do.        UE Dressing  UE Dressing Comments: While seated in recliner patient was set  up with  her bra and pull over shirt.   Patient donned her bra, no assist but it was decided not to don her shirt since it had a snug fitting cuff which would interfere with her I.V. insert.  LE Dressing  LE Dressing: Yes  LE Dressing Adaptive Equipment: Reacher  Pants Level of Assistance: Contact guard  LE Dressing Where Assessed: Recliner  LE Dressing Comments: Patient was able to manage the reacher to don her underpants and pants.  Assist needed to guide pants over feet secondary to having her slipper socks on which caught on the fabric.  Toileting  Toileting Level of Assistance: Maximum assistance  Where Assessed: Bedside commode  Toileting Comments: Patient was not able to use the toilet since she had taken a medication and needed to urgently have a BM.  Bedside commode placed next to bed for stand pivot transfer using wheeled walker.  Patient did not want to attempt managing hygiene following a BM and asked for assist.    Functional Standing Tolerance:       Bed Mobility/Transfers: Bed Mobility  Bed Mobility: Yes  Bed Mobility 1  Bed Mobility 1: Supine to sitting  Level of Assistance 1: Contact guard (With CGA and verbal cues patient was able to move to edge of bed.)  Transfers  Transfer: Yes  Transfer 1  Transfer From 1: Sit to  Transfer to 1: Stand  Transfer Level of Assistance 1: Minimum assistance (Toward end of session patient's level of assist decreased.)  Toilet Transfers  Toilet Transfer From: Bed  Toilet Transfer to: Standard bedside commode  Toilet Transfer Technique: Stand pivot             Therapy/Activity:   Patient educated in walker safety as she tends to step too close to the front walker bar.              Strength:       Other Activity:       Outcome Measures:[facility] Daily Activity  Putting on and taking off regular lower body clothing: A little  Bathing (including washing, rinsing, drying): A little  Putting on and taking off regular upper body clothing: None  Toileting, which includes using toilet,  bedpan or urinal: A little  Taking care of personal grooming such as brushing teeth: None  Eating Meals: None  Daily Activity - Total Score: 21  Education Documentation  No documentation found.  Education Comments  No comments found.           EDUCATION:  Education  Individual(s) Educated: Patient  Education Provided: Fall precautons, Risk and benefits of OT discussed with patient or other  Plan of Care Discussed and Agreed Upon: yes  Patient Response to Education: Patient/Caregiver Verbalized Understanding of Information    Goals:  Encounter Problems       Encounter Problems (Active)       OT Goals       Patient will complete functional transfers with SBA. (Progressing)       Start:  10/24/24    Expected End:  11/07/24            Patient will complete LE dressing with min A. (Progressing)       Start:  10/24/24    Expected End:  11/07/24            Patient will complete toileting with min A. (Progressing)       Start:  10/24/24    Expected End:  11/07/24

## 2024-10-26 PROCEDURE — 2500000001 HC RX 250 WO HCPCS SELF ADMINISTERED DRUGS (ALT 637 FOR MEDICARE OP): Performed by: PHYSICIAN ASSISTANT

## 2024-10-26 PROCEDURE — 2500000005 HC RX 250 GENERAL PHARMACY W/O HCPCS: Performed by: PHYSICIAN ASSISTANT

## 2024-10-26 PROCEDURE — 97116 GAIT TRAINING THERAPY: CPT | Mod: GP

## 2024-10-26 PROCEDURE — 2500000004 HC RX 250 GENERAL PHARMACY W/ HCPCS (ALT 636 FOR OP/ED): Performed by: NURSE PRACTITIONER

## 2024-10-26 PROCEDURE — 97530 THERAPEUTIC ACTIVITIES: CPT | Mod: GO,CO

## 2024-10-26 PROCEDURE — 1100000001 HC PRIVATE ROOM DAILY

## 2024-10-26 PROCEDURE — 97110 THERAPEUTIC EXERCISES: CPT | Mod: GP

## 2024-10-26 PROCEDURE — 2500000004 HC RX 250 GENERAL PHARMACY W/ HCPCS (ALT 636 FOR OP/ED): Performed by: PHYSICIAN ASSISTANT

## 2024-10-26 PROCEDURE — 97535 SELF CARE MNGMENT TRAINING: CPT | Mod: GO,CO

## 2024-10-26 ASSESSMENT — PAIN SCALES - GENERAL
PAINLEVEL_OUTOF10: 3
PAINLEVEL_OUTOF10: 7
PAINLEVEL_OUTOF10: 3
PAINLEVEL_OUTOF10: 7
PAINLEVEL_OUTOF10: 3
PAINLEVEL_OUTOF10: 3
PAINLEVEL_OUTOF10: 8

## 2024-10-26 ASSESSMENT — ACTIVITIES OF DAILY LIVING (ADL): HOME_MANAGEMENT_TIME_ENTRY: 15

## 2024-10-26 ASSESSMENT — PAIN DESCRIPTION - LOCATION: LOCATION: HIP

## 2024-10-26 ASSESSMENT — COGNITIVE AND FUNCTIONAL STATUS - GENERAL
TURNING FROM BACK TO SIDE WHILE IN FLAT BAD: A LITTLE
CLIMB 3 TO 5 STEPS WITH RAILING: A LOT
MOBILITY SCORE: 17
TOILETING: A LITTLE
DRESSING REGULAR LOWER BODY CLOTHING: A LOT
STANDING UP FROM CHAIR USING ARMS: A LITTLE
MOVING FROM LYING ON BACK TO SITTING ON SIDE OF FLAT BED WITH BEDRAILS: A LITTLE
HELP NEEDED FOR BATHING: A LOT
PERSONAL GROOMING: A LITTLE
DRESSING REGULAR UPPER BODY CLOTHING: A LITTLE
MOVING TO AND FROM BED TO CHAIR: A LITTLE
DAILY ACTIVITIY SCORE: 17
WALKING IN HOSPITAL ROOM: A LITTLE

## 2024-10-26 ASSESSMENT — PAIN - FUNCTIONAL ASSESSMENT
PAIN_FUNCTIONAL_ASSESSMENT: 0-10

## 2024-10-26 ASSESSMENT — PAIN DESCRIPTION - ORIENTATION: ORIENTATION: RIGHT

## 2024-10-26 NOTE — PROGRESS NOTES
"Iona Hilton is a 74 y.o. female presenting with Closed fracture of neck of right femur, initial encounter.    Subjective   Ms. Hilton is sitting up in her bed, describes mild to moderate right hip pain. She is voiding and had a BM this morning. She has many stairs at home, and concerned about navigating her split level home. She has requested Acute Rehab upon discharge        Objective     Vitals reviewed.   HENT:      Head: Normocephalic.      Mouth/Throat:      Mouth: Mucous membranes are moist.      Pharynx: Oropharynx is clear.   Eyes:      Extraocular Movements: Extraocular movements intact.   Cardiovascular:      Rate and Rhythm: Normal rate.   Pulmonary:      Effort: Pulmonary effort is normal.   Abdominal:      Palpations: Abdomen is soft.   Musculoskeletal:      Comments: Right hip dressing is dry and intact. Subtle ecchymosis about incisional area, region is supple to palpation.   light touch sensation is intact, ankle dorsiflexion/plantar flexion is intact. DP 2+/2 palpable     Skin:     General: Skin is warm and dry.      Capillary Refill: Capillary refill takes less than 2 seconds.   Neurological:      General: No focal deficit present.      Mental Status: She is alert. Mental status is at baseline.   Psychiatric:         Mood and Affect: Mood normal.     Last Recorded Vitals  Blood pressure 129/70, pulse 92, temperature 36.3 °C (97.3 °F), temperature source Temporal, resp. rate 18, height 1.702 m (5' 7\"), weight 54.4 kg (120 lb), SpO2 98%.  Intake/Output last 3 Shifts:  I/O last 3 completed shifts:  In: 410 (7.5 mL/kg) [P.O.:360; IV Piggyback:50]  Out: 300 (5.5 mL/kg) [Urine:300 (0.2 mL/kg/hr)]  Weight: 54.4 kg     Relevant Results      Scheduled medications  acetaminophen, 650 mg, oral, q6h  cefTRIAXone, 1 g, intravenous, q24h  enoxaparin, 40 mg, subcutaneous, Daily  lidocaine, 2 patch, transdermal, Daily  lisinopril, 5 mg, oral, Daily  polyethylene glycol, 17 g, oral, Daily      Continuous " medications  oxygen, 2 L/min, Last Rate: Stopped (10/23/24 7084)      PRN medications  PRN medications: acetaminophen, bisacodyl, bisacodyl, diphenhydrAMINE, metoclopramide **OR** metoclopramide, morphine, naloxone, ondansetron ODT **OR** ondansetron, traMADol, traMADol  No results found for this or any previous visit (from the past 24 hours).            XR pelvis 1-2 views    Result Date: 10/23/2024  Interpreted By:  Tamiko Montiel, STUDY: Single view pelvis.   INDICATION: Signs/Symptoms:post op R hemiarthroplasty.   COMPARISON: 10/22/2024.   ACCESSION NUMBER(S): [id number]   ORDERING CLINICIAN: LEONOR CRAMER   FINDINGS: No acute fracture or malalignment. Status post right hip hemiarthroplasty. Hardware is intact without perihardware fractures or lucencies. Left hip joint space is maintained. Postsurgical soft tissue gas in the left gluteal region.       1. Right hip hemiarthroplasty without hardware complication.   MACRO: None.   Signed by: Tamiko Montiel 10/23/2024 7:26 PM Dictation workstation:   [id number]    ECG 12 lead    Result Date: 10/23/2024  Normal sinus rhythm Normal ECG No previous ECGs available    CT hip right wo IV contrast    Result Date: 10/23/2024  Interpreted By:  Omar Sena, STUDY: CT HIP RIGHT WO IV CONTRAST;  10/22/2024 11:47 pm   INDICATION: Signs/Symptoms:preop planning.   COMPARISON: Same-day hip radiographs   ACCESSION NUMBER(S): [id number]   ORDERING CLINICIAN: ROGERS SINGH   TECHNIQUE: Axial CT images of the right hip with coronal and sagittal reconstructed images obtained without intravenous contrast.   FINDINGS:   There is an acute fracture of the right femoral neck mildly comminuted and impacted. Fracture demonstrates anterosuperior displacement by approximately 1/2 bone width as well as mild apex-anterior angulation and varus angulation. There is soft tissue swelling about the fracture site.   Left parasymphyseal inferior pubic ramus bone island. Large  rectal stool volume.       1. Acute impacted angulated and displaced right femoral neck fracture. 2. Large rectal stool volume. Please correlate for fecal impaction.   Signed by: Omar Sena 10/23/2024 12:43 AM Dictation workstation:   [id number]    XR chest 1 view    Result Date: 10/22/2024  Interpreted By:  Adolfo Davis, STUDY: XR CHEST 1 VIEW;  10/22/2024 10:55 pm   INDICATION: Signs/Symptoms:preop.   COMPARISON: 2/13/2024   ACCESSION NUMBER(S): [id number]   ORDERING CLINICIAN: ROGERS SINGH   FINDINGS: The cardiac silhouette is normal in size. No focal airspace consolidation or pleural effusion. No pneumothorax. Scoliotic curvature of the thoracolumbar spine.       No airspace consolidation or pleural effusion.   MACRO: None   Signed by: Adolfo Davis 10/22/2024 11:48 PM Dictation workstation:   [id number]    XR hip right with pelvis when performed 2 or 3 views    Result Date: 10/22/2024  Interpreted By:  Adolfo Davis, STUDY: XR HIP RIGHT WITH PELVIS WHEN PERFORMED 2 OR 3 VIEWS; ;  10/22/2024 10:55 pm   INDICATION: Signs/Symptoms:fall, ttp.     COMPARISON: None.   ACCESSION NUMBER(S): [id number]   ORDERING CLINICIAN: DAMIAN MONROY   FINDINGS: There is acute right femoral neck fracture. There is lateral and superior displacement of distal fracture fragment. Levoconvex curvature of the imaged lower lumbar spine. There is osteopenia.       Acute right femoral neck fracture.     MACRO: None   Signed by: Adolfo Davis 10/22/2024 11:47 PM Dictation workstation:   [id number]                Assessment/Plan   Assessment & Plan  Closed fracture of neck of right femur, initial encounter    Neutrophilic leukocytosis    Acute cystitis without hematuria    POD #3 s/p right hip hemiarthroplasty  Continue PT/OT, WBAT right  LE  Using incentive spirometer   Reviewed am labs  Multimodal pain regimen  Surgical hip dressing to be removed on post op day #7  VTE prophylaxis: lovenox 40mg daily X 30 days  Dispo:  home/AR depending on progression in therapy today  Follow up with Dr. Nino in 2 weeks       I spent 25 minutes in the professional and overall care of this patient.      Aurora Woodward PA-C

## 2024-10-26 NOTE — PROGRESS NOTES
Occupational Therapy    OT Treatment    Patient Name: Iona Hilton  MRN: [id number]  Department: [address]  Room: [address] [place] [address]  Today's Date: 10/26/2024  Time Calculation  Start Time: 1017  Stop Time: 1040  Time Calculation (min): 23 min      Assessment:  End of Session Communication: Bedside nurse  End of Session Patient Position:  (with PTA)     Plan:  Treatment Interventions: ADL retraining, Functional transfer training, Endurance training, Patient/family training  OT Frequency: Daily  Treatment Interventions: ADL retraining, Functional transfer training, Endurance training, Patient/family training    Subjective   Previous Visit Info:  OT Last Visit  OT Received On: 10/26/24  General:  General  Reason for Referral: Ben artroplasty R LE  Prior to Session Communication: Bedside nurse  Patient Position Received: Bed, 3 rail up, Alarm on  Precautions:  LE Weight Bearing Status: Weight Bearing as Tolerated  Medical Precautions: Fall precautions  Post-Surgical Precautions: Right hip precautions    Pain:  Pain Assessment  Pain Assessment: 0-10  0-10 (Numeric) Pain Score: 3  Pain Type: Surgical pain  Pain Location: Hip  Pain Orientation: Right    Objective    Cognition:  Cognition  Overall Cognitive Status: Impaired  Orientation Level: Oriented X4  Following Commands: Follows multistep commands consistently  Safety Judgment: Decreased awareness of need for safety precautions  Problem Solving: Assistance required to implement solutions  Insight: Mild  Impulsive: Within functional limits    Activities of Daily Living: Grooming  Grooming Level of Assistance:  (performed oral care, hand hygiene, washed face and brushed hair in stance at sink with SBA after set up with 2-1 ue support.)  Grooming Where Assessed: Standing sinkside    LE Dressing  LE Dressing: Yes  Adult Briefs Level of Assistance:  (donned underwear with ae and comp tech with min a. pants mgmt while in stance with min a.)  LE Dressing Where Assessed: Edge of  bed  Functional Standing Tolerance:  Functional Standing Tolerance Comments: patient stood for a total of 3 mins thsi date with fair/fair- balance with 2-0 ue support as needed during functional ambulation/transfers and ADL tasks  Bed Mobility/Transfers: Bed Mobility 1  Bed Mobility 1: Supine to sitting  Level of Assistance 1: Minimum assistance (with comp tech with use of gait belt)    Transfers  Transfer: Yes  Transfer 1  Technique 1: Sit to stand, Stand to sit  Transfer Device 1: Walker, Gait belt  Transfer Level of Assistance 1: Minimum assistance, +2, Minimal verbal cues  Transfers 2  Transfer Device 2: Walker, Gait belt  Transfer Level of Assistance 2: Contact guard  Trials/Comments 2: functional ambulation    Sitting Balance:  Dynamic Sitting Balance  Dynamic Sitting-Level of Assistance:  (fair+/good-)  Standing Balance:  Dynamic Standing Balance  Dynamic Standing-Level of Assistance:  (fair/fair-)    Outcome Measures:[facility] Daily Activity  Putting on and taking off regular lower body clothing: A lot  Bathing (including washing, rinsing, drying): A lot  Putting on and taking off regular upper body clothing: A little  Toileting, which includes using toilet, bedpan or urinal: A little  Taking care of personal grooming such as brushing teeth: A little  Eating Meals: None  Daily Activity - Total Score: 17    OP EDUCATION:  Education  Individual(s) Educated: Patient  Education Provided: Diagnosis & Precautions, Symptom management, Ergonomics and postural realignment, Joint protection and energy conservation, Fall precautons, Risk and benefits of OT discussed with patient or other, POC discussed and agreed upon  Diagnosis and Precautions: WBAT, posterior hip precautions  Equipment:  (AE, EC tech, home DME)  Patient/Caregiver Demonstrated Understanding: yes  Plan of Care Discussed and Agreed Upon: yes  Patient Response to Education: Patient/Caregiver Verbalized Understanding of Information    Goals:  Encounter  Problems       Encounter Problems (Active)       OT Goals       Patient will complete functional transfers with SBA. (Progressing)       Start:  10/24/24    Expected End:  11/07/24            Patient will complete LE dressing with min A. (Progressing)       Start:  10/24/24    Expected End:  11/07/24            Patient will complete toileting with min A. (Progressing)       Start:  10/24/24    Expected End:  11/07/24

## 2024-10-26 NOTE — CARE PLAN
The patient's goals for the shift include sleep, pain control    The clinical goals for the shift include pain will be managed    Over the shift, the patient did make progress towards this goal.  Problem: Pain  Goal: Takes deep breaths with improved pain control throughout the shift  Outcome: Progressing  Goal: Turns in bed with improved pain control throughout the shift  Outcome: Progressing  Goal: Walks with improved pain control throughout the shift  Outcome: Progressing  Goal: Free from opioid side effects throughout the shift  Outcome: Progressing  Goal: Free from acute confusion related to pain meds throughout the shift  Outcome: Progressing     Problem: Pain - Adult  Goal: Verbalizes/displays adequate comfort level or baseline comfort level  Outcome: Progressing     Problem: Safety - Adult  Goal: Free from fall injury  Outcome: Progressing     Problem: Discharge Planning  Goal: Discharge to home or other facility with appropriate resources  Outcome: Progressing     Problem: Chronic Conditions and Co-morbidities  Goal: Patient's chronic conditions and co-morbidity symptoms are monitored and maintained or improved  Outcome: Progressing

## 2024-10-26 NOTE — CARE PLAN
The patient's goals for the shift include sleep, pain control    The clinical goals for the shift include pain will be managed    Pt A/Ox4, pain is tolerable, requesting prn pain med prior PT/OT. Pt getting up x1 with a walker. Dressing RLE C/D/I, no drainage notice. All needs met, bed low and lock, call light within reach. Bed alarm is on and functioning.

## 2024-10-26 NOTE — PROGRESS NOTES
Physical Therapy    Physical Therapy Treatment    Patient Name: Iona Hilton  MRN: [id number]  Today's Date: 10/26/2024  Time Calculation  Start Time: 1022  Stop Time: 1106  Time Calculation (min): 44 min     4101/4101-A    Assessment/Plan   PT Assessment  PT Assessment Results: Decreased strength, Decreased mobility, Pain  Rehab Prognosis: Good  Evaluation/Treatment Tolerance: Patient tolerated treatment well  Medical Staff Made Aware: Yes  Strengths: Ability to acquire knowledge, Attitude of self  End of Session Communication: Bedside nurse  Assessment Comment: expect gradual progress, as pain is controlled.  End of Session Patient Position: Up in chair, Alarm on  PT Plan  Inpatient/Swing Bed or Outpatient: Inpatient  PT Plan  Treatment/Interventions: Bed mobility, Transfer training, Gait training, Stair training, Strengthening, Endurance training, Therapeutic exercise, Therapeutic activity  PT Plan: Ongoing PT  PT Eval Only Reason: At baseline function  PT Frequency: Daily  PT Discharge Recommendations: High intensity level of continued care  Equipment Recommended upon Discharge: Wheeled walker  PT Recommended Transfer Status: Assist x2  PT - OK to Discharge: Yes (When medically allowed.)    Current Problem:  Patient Active Problem List   Diagnosis    Single kidney    Menopausal symptoms    Age-related osteoporosis without current pathological fracture    Essential hypertension    Vaginal discomfort    Vitamin D deficiency    Vaginal Pap smear with ASC-US    Allergic rhinitis    Bicornuate uterus    Endometriosis    Esophageal reflux    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  General  Reason for Referral: R hip fx. / R  Hemiarthroplasty.  Referred By: Mica  Past Medical History Relevant to Rehab: Osteoporosis  Family/Caregiver Present: No  Co-Treatment: OT  Prior to Session Communication: Bedside nurse  Patient Position Received: Bed, 3 rail up, Alarm on  Preferred Learning Style: verbal, visual  General Comment: Pleasant.  Reviewed THR precautions.  Subjective     Precautions:  Precautions  LE Weight Bearing Status: Weight Bearing as Tolerated  Medical Precautions: Fall precautions  Post-Surgical Precautions: Right hip precautions  Precautions Comment: Patient voiced understanding    Vital Signs:     Objective     Pain:  Pain Assessment  Pain Assessment: 0-10  0-10 (Numeric) Pain Score: 8  Pain Type: Surgical pain  Pain Location: Hip  Pain Orientation: Right  Pain Interventions: Medication (See MAR), Rest, Cold pack    Cognition:  Cognition  Overall Cognitive Status: Within Functional Limits  Orientation Level: Oriented X4    Postural Control:  Postural Control  Postural Control: Within Functional Limits    Extremity/Trunk Assessments:  RUE   RUE : Within Functional Limits  LUE   LUE: Within Functional Limits  RLE   RLE : Within Functional Limits  LLE   LLE : Within Functional Limits    Activity Tolerance:  Activity Tolerance  Endurance: Endurance does not limit participation in activity  Activity Tolerance Comments: did report some fatigue on return to her room.    Treatments:  Therapeutic Exercise  Therapeutic Exercise Performed: Yes  Therapeutic Exercise Activity 1: heel-toe raises x15, SAQ's x 12, adductor ball squeezesx 12.        Bed Mobility  Bed Mobility: Yes  Bed Mobility 1  Bed Mobility 1: Supine to sitting  Level of Assistance 1: Minimum assistance (X 1.)  Bed Mobility Comments 1: Used a leg  to slowly slide to EOB..  Ambulation/Gait Training  Ambulation/Gait Training Performed: Yes  Ambulation/Gait Training 1  Surface 1: Level tile  Device 1: Rolling walker  Gait Support Devices: Gait belt  Assistance 1:  "Contact guard  Quality of Gait 1: Antalgic  Comments/Distance (ft) 1: 120 ft. x2.  used a low \"step to\" pattern. (that's slow)  Transfers  Transfer: Yes  Transfer 1  Transfer From 1: Sit to, Stand to  Transfer Device 1: Walker  Transfer Level of Assistance 1: Minimum assistance (x 2.)  Stairs  Stairs: No  Stairs  Comment/Number of Steps 1: (see 10/25 note)       Outcome Measures:     [facility] Basic Mobility  Turning from your back to your side while in a flat bed without using bedrails: A little  Moving from lying on your back to sitting on the side of a flat bed without using bedrails: A little  Moving to and from bed to chair (including a wheelchair): A little  Standing up from a chair using your arms (e.g. wheelchair or bedside chair): A little  To walk in hospital room: A little  Climbing 3-5 steps with railing: A lot  Basic Mobility - Total Score: 17                                      Education Documentation  Precautions, taught by George Alicea PT at 10/26/2024  2:08 PM.  Learner: Patient  Readiness: Eager  Method: Demonstration, Explanation  Response: Needs Reinforcement, Demonstrated Understanding    Body Mechanics, taught by George Alicea PT at 10/26/2024  2:08 PM.  Learner: Patient  Readiness: Eager  Method: Demonstration, Explanation  Response: Needs Reinforcement, Demonstrated Understanding    Mobility Training, taught by George Alicea, PT at 10/26/2024  2:08 PM.  Learner: Patient  Readiness: Eager  Method: Demonstration, Explanation  Response: Needs Reinforcement, Demonstrated Understanding    Education Comments  No comments found.           EDUCATION:     Encounter Problems       Encounter Problems (Active)       PT Problem       Pt will demonstrate mod I for all bed mobility   (Progressing)       Start:  10/24/24    Expected End:  11/07/24            Pt will demonstrate mod I for all transfers with WW  (Progressing)       Start:  10/24/24    Expected End:  11/07/24            Pt will " ambulate 250 ft with WW.  (Progressing)       Start:  10/24/24    Expected End:  11/07/24            Pt will be able to negotiate 2 steps with rail.  (Progressing)       Start:  10/24/24    Expected End:  11/07/24               Pain - Adult

## 2024-10-27 VITALS
HEIGHT: 67 IN | OXYGEN SATURATION: 100 % | RESPIRATION RATE: 16 BRPM | HEART RATE: 86 BPM | TEMPERATURE: 96.8 F | BODY MASS INDEX: 18.83 KG/M2 | DIASTOLIC BLOOD PRESSURE: 78 MMHG | SYSTOLIC BLOOD PRESSURE: 143 MMHG | WEIGHT: 120 LBS

## 2024-10-27 PROCEDURE — 97116 GAIT TRAINING THERAPY: CPT | Mod: GP,CQ

## 2024-10-27 PROCEDURE — 97535 SELF CARE MNGMENT TRAINING: CPT | Mod: GO,CO

## 2024-10-27 PROCEDURE — 97530 THERAPEUTIC ACTIVITIES: CPT | Mod: GP,CQ

## 2024-10-27 PROCEDURE — 2500000004 HC RX 250 GENERAL PHARMACY W/ HCPCS (ALT 636 FOR OP/ED): Performed by: NURSE PRACTITIONER

## 2024-10-27 PROCEDURE — 2500000004 HC RX 250 GENERAL PHARMACY W/ HCPCS (ALT 636 FOR OP/ED): Performed by: PHYSICIAN ASSISTANT

## 2024-10-27 PROCEDURE — 2500000001 HC RX 250 WO HCPCS SELF ADMINISTERED DRUGS (ALT 637 FOR MEDICARE OP): Performed by: PHYSICIAN ASSISTANT

## 2024-10-27 PROCEDURE — 1100000001 HC PRIVATE ROOM DAILY

## 2024-10-27 PROCEDURE — 2500000005 HC RX 250 GENERAL PHARMACY W/O HCPCS: Performed by: PHYSICIAN ASSISTANT

## 2024-10-27 ASSESSMENT — PAIN - FUNCTIONAL ASSESSMENT
PAIN_FUNCTIONAL_ASSESSMENT: 0-10

## 2024-10-27 ASSESSMENT — PAIN SCALES - GENERAL
PAINLEVEL_OUTOF10: 3
PAINLEVEL_OUTOF10: 7
PAINLEVEL_OUTOF10: 3
PAINLEVEL_OUTOF10: 3
PAINLEVEL_OUTOF10: 7
PAINLEVEL_OUTOF10: 5 - MODERATE PAIN

## 2024-10-27 ASSESSMENT — COGNITIVE AND FUNCTIONAL STATUS - GENERAL
DRESSING REGULAR UPPER BODY CLOTHING: A LITTLE
TURNING FROM BACK TO SIDE WHILE IN FLAT BAD: A LITTLE
MOBILITY SCORE: 17
DAILY ACTIVITIY SCORE: 17
TOILETING: A LITTLE
PERSONAL GROOMING: A LITTLE
HELP NEEDED FOR BATHING: A LOT
DRESSING REGULAR LOWER BODY CLOTHING: A LOT
STANDING UP FROM CHAIR USING ARMS: A LITTLE
CLIMB 3 TO 5 STEPS WITH RAILING: A LOT
MOVING FROM LYING ON BACK TO SITTING ON SIDE OF FLAT BED WITH BEDRAILS: A LITTLE
MOVING TO AND FROM BED TO CHAIR: A LITTLE
WALKING IN HOSPITAL ROOM: A LITTLE

## 2024-10-27 ASSESSMENT — PAIN DESCRIPTION - LOCATION: LOCATION: HIP

## 2024-10-27 ASSESSMENT — PAIN SCALES - PAIN ASSESSMENT IN ADVANCED DEMENTIA (PAINAD)
TOTALSCORE: 0
BREATHING: NORMAL
TOTALSCORE: MEDICATION (SEE MAR)
CONSOLABILITY: NO NEED TO CONSOLE
BODYLANGUAGE: RELAXED
FACIALEXPRESSION: SMILING OR INEXPRESSIVE

## 2024-10-27 ASSESSMENT — ACTIVITIES OF DAILY LIVING (ADL): HOME_MANAGEMENT_TIME_ENTRY: 44

## 2024-10-27 ASSESSMENT — PAIN DESCRIPTION - ORIENTATION: ORIENTATION: RIGHT

## 2024-10-27 NOTE — PROGRESS NOTES
Physical Therapy    Physical Therapy Treatment    Patient Name: Iona Hilton  MRN: [id number]  Today's Date: 10/27/2024  Time Calculation  Start Time: 1741  Stop Time: 1825  Time Calculation (min): 44 min     4101/4101-A    Assessment/Plan   End of Session Communication: Bedside nurse  Assessment Comment: Patient presents with good effort throughout todays session. Very pleasant, motivated and willing to participate in all tasks. Demonstrates improving gait mechanics with increased focus on posture and heel strike. Patient is receptive to cues, demonstrates good carry over. Pain limiting gait distances this session. Call light and all needs in reach.  End of Session Patient Position: Up in chair, Alarm off, caregiver present        General Visit Information:   PT  Visit  PT Received On: 10/27/24  General  Prior to Session Communication: Bedside nurse  Patient Position Received: Up in chair, Alarm off, caregiver present  General Comment: Pt agreeable to therapy this date, spouse present throughout entire session.  Subjective     Precautions:  Precautions  LE Weight Bearing Status: Weight Bearing as Tolerated (RLE)  Medical Precautions: Fall precautions  Post-Surgical Precautions: Right hip precautions (posterior)  Precautions Comment: Pt able to recall 2/3 hip precautions with education provided for improved understanding.  Objective     Pain:  Pain Assessment  Pain Assessment: 0-10  0-10 (Numeric) Pain Score: 7  Pain Type: Surgical pain  Pain Location: Hip  Pain Orientation: Right  Pain Interventions: Cold applied    Cognition:  Cognition  Overall Cognitive Status: Within Functional Limits  Orientation Level: Oriented X4  Processing Speed: Within funtional limits        Treatments:              Ambulation/Gait Training  Ambulation/Gait Training Performed: Yes  Ambulation/Gait Training 1  Surface 1: Level tile  Device 1: Rolling walker (FWW)  Gait Support Devices: Gait belt  Assistance 1: Contact guard  Quality of  Gait 1: Narrow base of support, Diminished heel strike, Decreased step length, Antalgic  Comments/Distance (ft) 1: Pt ambulated 80' with FWW, CGA. Pt demonstrates NBOS, antalgic with step to gait pattern, short step length. Poor foot clearance.VC for gait  corrections, improving heel strike and beginning step through gait pattern. Good AD management with directional changes.  Transfers  Transfer: Yes  Transfer 1  Transfer From 1: Stand to  Transfer to 1: Chair with arms, Commode-standard  Technique 1: Stand pivot  Transfer Device 1: Walker, Gait belt (FWW)  Transfer Level of Assistance 1: Contact guard  Trials/Comments 1: Pt performed stand pivot from FWW<>chair/BSC. Pt requires minimal VC for sequencing, fair eccentric control to sitting position with VC for increased focus. GoodAD management throughout.  Transfers 2  Transfer From 2: Chair with arms to, Commode-standard to  Transfer to 2: Stand  Technique 2: Stand to sit, Sit to stand  Transfer Device 2: Walker, Gait belt (FWW)  Transfer Level of Assistance 2: Contact guard  Trials/Comments 2: Pt performed STS 10xfrom recliner<>FWW CGA progressing to SUP. Minimal VC required for sequencing. STS from BSC<>FWW CGA, incresed time required from lower surface.          Outcome Measures:     [facility] Basic Mobility  Turning from your back to your side while in a flat bed without using bedrails: A little  Moving from lying on your back to sitting on the side of a flat bed without using bedrails: A little  Moving to and from bed to chair (including a wheelchair): A little  Standing up from a chair using your arms (e.g. wheelchair or bedside chair): A little  To walk in hospital room: A little  Climbing 3-5 steps with railing: A lot  Basic Mobility - Total Score: 17                                      Education Documentation  Precautions, taught by Natalya Lucas PTA at 10/27/2024  7:31 PM.  Learner: Significant Other, Patient  Readiness: Acceptance  Method:  Explanation, Demonstration  Response: Verbalizes Understanding, Needs Reinforcement    Body Mechanics, taught by Natalya Lucas PTA at 10/27/2024  7:31 PM.  Learner: Significant Other, Patient  Readiness: Acceptance  Method: Explanation, Demonstration  Response: Verbalizes Understanding, Needs Reinforcement    Mobility Training, taught by Natalya Lucas PTA at 10/27/2024  7:31 PM.  Learner: Significant Other, Patient  Readiness: Acceptance  Method: Explanation, Demonstration  Response: Verbalizes Understanding, Needs Reinforcement    Education Comments  No comments found.           EDUCATION:  Outpatient Education  Education Comment: Education provided on benefits of icing, energy conservation techniques, importance of HEP and functional mobility  Encounter Problems       Encounter Problems (Active)       PT Problem       Pt will demonstrate mod I for all bed mobility   (Not Progressing)       Start:  10/24/24    Expected End:  11/07/24            Pt will demonstrate mod I for all transfers with WW  (Progressing)       Start:  10/24/24    Expected End:  11/07/24            Pt will ambulate 250 ft with WW.  (Progressing)       Start:  10/24/24    Expected End:  11/07/24            Pt will be able to negotiate 2 steps with rail.  (Not Progressing)       Start:  10/24/24    Expected End:  11/07/24               Pain - Adult

## 2024-10-27 NOTE — PROGRESS NOTES
"Iona Hilton is a 74 y.o. female on day 4 of admission presenting with Closed fracture of neck of right femur, initial encounter.    Subjective   Patient is status post hip hemiarthroplasty postop day 4       Objective     Physical Exam    Wound clean dry and intact neurologically intact compartments are soft incisions clean and dry    Moving toes foot and ankle moderate straight leg raise improving strength improving but still weakness around the right hip and thigh postsurgical changes as expected    Last Recorded Vitals  Blood pressure 128/65, pulse 85, temperature 37.2 °C (99 °F), temperature source Temporal, resp. rate 16, height 1.702 m (5' 7\"), weight 54.4 kg (120 lb), SpO2 98%.  Intake/Output last 3 Shifts:  I/O last 3 completed shifts:  In: 340 (6.2 mL/kg) [P.O.:240; IV Piggyback:100]  Out: 700 (12.9 mL/kg) [Urine:700 (0.4 mL/kg/hr)]  Weight: 54.4 kg     Relevant Results      Scheduled medications  acetaminophen, 650 mg, oral, q6h  enoxaparin, 40 mg, subcutaneous, Daily  lidocaine, 2 patch, transdermal, Daily  lisinopril, 5 mg, oral, Daily  polyethylene glycol, 17 g, oral, Daily      Continuous medications  oxygen, 2 L/min, Last Rate: Stopped (10/23/24 1754)      PRN medications  PRN medications: acetaminophen, bisacodyl, bisacodyl, diphenhydrAMINE, metoclopramide **OR** metoclopramide, morphine, naloxone, ondansetron ODT **OR** ondansetron, traMADol, traMADol  No results found for this or any previous visit (from the past 24 hours).                         Assessment/Plan   Assessment & Plan  Closed fracture of neck of right femur, initial encounter    Neutrophilic leukocytosis    Acute cystitis without hematuria    Postop day 4 hip hemiarthroplasty right side  Weight rehab placement          Perez Birch MD      "

## 2024-10-27 NOTE — CARE PLAN
The patient's goals for the shift include sleep, pain control    The clinical goals for the shift include pain control      Problem: Pain  Goal: Turns in bed with improved pain control throughout the shift  Outcome: Progressing  Goal: Walks with improved pain control throughout the shift  Outcome: Progressing  Goal: Free from opioid side effects throughout the shift  Outcome: Progressing  Goal: Free from acute confusion related to pain meds throughout the shift  Outcome: Progressing     Problem: Pain - Adult  Goal: Verbalizes/displays adequate comfort level or baseline comfort level  Outcome: Progressing     Problem: Safety - Adult  Goal: Free from fall injury  Outcome: Progressing     Problem: Discharge Planning  Goal: Discharge to home or other facility with appropriate resources  Outcome: Progressing     Problem: Chronic Conditions and Co-morbidities  Goal: Patient's chronic conditions and co-morbidity symptoms are monitored and maintained or improved  Outcome: Progressing

## 2024-10-27 NOTE — PROGRESS NOTES
Occupational Therapy    OT Treatment    Patient Name: Iona Hilton  MRN: [id number]  Today's Date: 10/27/2024  Time Calculation  Start Time: 1009  Stop Time: 1053  Time Calculation (min): 44 min       4101/4101-A    Assessment:  End of Session Communication: Bedside nurse  End of Session Patient Position: Up in chair, Alarm on       Plan:  OT Frequency: Daily  OT Discharge Recommendations: High intensity level of continued care     Subjective        10/27/24 1009   OT Last Visit   OT Received On 10/27/24   General   Prior to Session Communication Bedside nurse   Patient Position Received Bed, 3 rail up;Alarm on   Preferred Learning Style verbal;visual   General Comment Pt very pleasant and cooperative, RN cleared for therapy.   Pain Assessment   Pain Assessment 0-10   0-10 (Numeric) Pain Score 7   Pain Type Surgical pain   Pain Location Hip   Pain Orientation Right   Pain Interventions Medication (See MAR);Repositioned  (Pt c/o 7/10 pain when up, recently medicated.)   Cognition   Overall Cognitive Status WFL   Orientation Level Oriented X4   Grooming   Grooming Level of Assistance Contact guard   Grooming Where Assessed Standing sinkside   Toileting   Toileting Level of Assistance Minimum assistance   Where Assessed Bedside commode   Transfers   Transfer Yes   Transfer 1   Transfer From 1 Sit to   Transfer to 1 Sit   Technique 1 Sit to stand;Stand to sit   Transfer Device 1 Walker;Gait belt   Transfer Level of Assistance 1 Contact guard;Minimum assistance   Trials/Comments 1 FM performed within household distances CGA, chair>sink>hallway>BSC>chair CGA, slow gait.   Toilet Transfers   Toilet Transfer From Bed   Toilet Transfer Type To and from   Toilet Transfer to Standard bedside commode   Toilet Transfer Technique Ambulating   Toilet Transfers Minimal assistance   IP OT Assessment   End of Session Communication Bedside nurse   End of Session Patient Position Up in chair;Alarm on   Inpatient Plan   OT Frequency  Daily   OT Discharge Recommendations High intensity level of continued care       Outcome Measures:[facility] Daily Activity  Putting on and taking off regular lower body clothing: A lot  Bathing (including washing, rinsing, drying): A lot  Putting on and taking off regular upper body clothing: A little  Toileting, which includes using toilet, bedpan or urinal: A little  Taking care of personal grooming such as brushing teeth: A little  Eating Meals: None  Daily Activity - Total Score: 17  Education Documentation  Body Mechanics, taught by ALLEY Krueger at 10/27/2024  2:03 PM.  Learner: Patient  Readiness: Acceptance  Method: Explanation, Demonstration  Response: Verbalizes Understanding, Demonstrated Understanding, Needs Reinforcement    Precautions, taught by ALLEY Krueger at 10/27/2024  2:03 PM.  Learner: Patient  Readiness: Acceptance  Method: Explanation, Demonstration  Response: Verbalizes Understanding, Demonstrated Understanding, Needs Reinforcement    ADL Training, taught by ALLEY Krueger at 10/27/2024  2:03 PM.  Learner: Patient  Readiness: Acceptance  Method: Explanation, Demonstration  Response: Verbalizes Understanding, Demonstrated Understanding, Needs Reinforcement    Reinforcement    Education Comments  No comments found.            Goals:  Encounter Problems       Encounter Problems (Active)       OT Goals       Patient will complete functional transfers with SBA. (Progressing)       Start:  10/24/24    Expected End:  11/07/24            Patient will complete LE dressing with min A. (Progressing)       Start:  10/24/24    Expected End:  11/07/24            Patient will complete toileting with min A. (Progressing)       Start:  10/24/24    Expected End:  11/07/24

## 2024-10-28 VITALS
WEIGHT: 120 LBS | TEMPERATURE: 98.6 F | HEART RATE: 93 BPM | DIASTOLIC BLOOD PRESSURE: 80 MMHG | SYSTOLIC BLOOD PRESSURE: 160 MMHG | OXYGEN SATURATION: 97 % | BODY MASS INDEX: 18.83 KG/M2 | RESPIRATION RATE: 18 BRPM | HEIGHT: 67 IN

## 2024-10-28 PROCEDURE — 97535 SELF CARE MNGMENT TRAINING: CPT | Mod: GO,CO

## 2024-10-28 PROCEDURE — 97116 GAIT TRAINING THERAPY: CPT | Mod: GP,CQ

## 2024-10-28 PROCEDURE — 2500000005 HC RX 250 GENERAL PHARMACY W/O HCPCS: Performed by: PHYSICIAN ASSISTANT

## 2024-10-28 PROCEDURE — 2500000004 HC RX 250 GENERAL PHARMACY W/ HCPCS (ALT 636 FOR OP/ED): Performed by: NURSE PRACTITIONER

## 2024-10-28 PROCEDURE — 2500000001 HC RX 250 WO HCPCS SELF ADMINISTERED DRUGS (ALT 637 FOR MEDICARE OP): Performed by: PHYSICIAN ASSISTANT

## 2024-10-28 PROCEDURE — 97110 THERAPEUTIC EXERCISES: CPT | Mod: GP,CQ

## 2024-10-28 RX ORDER — ENOXAPARIN SODIUM 100 MG/ML
40 INJECTION SUBCUTANEOUS DAILY
Start: 2024-10-29 | End: 2024-11-24

## 2024-10-28 RX ORDER — TRAMADOL HYDROCHLORIDE 50 MG/1
50 TABLET ORAL EVERY 8 HOURS PRN
Start: 2024-10-28

## 2024-10-28 RX ORDER — ACETAMINOPHEN 160 MG/5ML
650 SOLUTION ORAL EVERY 6 HOURS
Start: 2024-10-28

## 2024-10-28 ASSESSMENT — COGNITIVE AND FUNCTIONAL STATUS - GENERAL
WALKING IN HOSPITAL ROOM: A LITTLE
MOBILITY SCORE: 17
TOILETING: A LITTLE
STANDING UP FROM CHAIR USING ARMS: A LITTLE
DRESSING REGULAR LOWER BODY CLOTHING: A LOT
TURNING FROM BACK TO SIDE WHILE IN FLAT BAD: A LITTLE
DRESSING REGULAR UPPER BODY CLOTHING: A LITTLE
DAILY ACTIVITIY SCORE: 18
CLIMB 3 TO 5 STEPS WITH RAILING: A LOT
MOVING TO AND FROM BED TO CHAIR: A LITTLE
PERSONAL GROOMING: A LITTLE
HELP NEEDED FOR BATHING: A LITTLE
MOVING FROM LYING ON BACK TO SITTING ON SIDE OF FLAT BED WITH BEDRAILS: A LITTLE

## 2024-10-28 ASSESSMENT — PAIN SCALES - GENERAL
PAINLEVEL_OUTOF10: 8
PAINLEVEL_OUTOF10: 2
PAINLEVEL_OUTOF10: 8
PAINLEVEL_OUTOF10: 9
PAINLEVEL_OUTOF10: 0 - NO PAIN
PAINLEVEL_OUTOF10: 2
PAINLEVEL_OUTOF10: 2

## 2024-10-28 ASSESSMENT — PAIN DESCRIPTION - LOCATION: LOCATION: HIP

## 2024-10-28 ASSESSMENT — PAIN - FUNCTIONAL ASSESSMENT
PAIN_FUNCTIONAL_ASSESSMENT: 0-10
PAIN_FUNCTIONAL_ASSESSMENT: 0-10

## 2024-10-28 ASSESSMENT — PAIN DESCRIPTION - ORIENTATION: ORIENTATION: RIGHT

## 2024-10-28 ASSESSMENT — ACTIVITIES OF DAILY LIVING (ADL): HOME_MANAGEMENT_TIME_ENTRY: 38

## 2024-10-28 NOTE — PROGRESS NOTES
Occupational Therapy    OT Treatment    Patient Name: Iona Hilton  MRN: [id number]  Today's Date: 10/28/2024  Time Calculation  Start Time: 1021  Stop Time: 1059  Time Calculation (min): 38 min       4101/4101-A    Assessment:  End of Session Communication: Bedside nurse  End of Session Patient Position: Up in chair, Alarm on       Plan:  OT Frequency: Daily  OT Discharge Recommendations: High intensity level of continued care  Equipment Recommended upon Discharge: Wheeled walker (shower chair, reacher, sock aide, LH sponge/shoe horn)     Subjective      10/28/24 1021   OT Last Visit   OT Received On 10/28/24   General   Reason for Referral Ben artroplasty R LE   Referred By Mica   Prior to Session Communication Bedside nurse   Patient Position Received Up in chair;Alarm on   General Comment Pt agreeable to tx and cleared for participation.   Precautions   LE Weight Bearing Status Weight Bearing as Tolerated   Medical Precautions Fall precautions   Post-Surgical Precautions Right hip precautions   Precautions Comment Pt verbalizes 3/3 precautions, demos throughout tx, one cue to avoid pivoting on LE while turning   Pain Assessment   0-10 (Numeric) Pain Score   (2 at rest; 8/10 with activity)   Pain Type Surgical pain   Pain Location Hip   Pain Orientation Right   Pain Interventions Distraction;Repositioned   Cognition   Orientation Level Oriented X4   Grooming   Grooming Level of Assistance Contact guard   Grooming Where Assessed Standing sinkside   Grooming Comments Pt completed various grooming tasks in stance, cues for fww placement and safety in stance, UE support as needed with CGA for safety.   UE Dressing   UE Dressing Level of Assistance Setup   UE Dressing Where Assessed Recliner   UE Dressing Comments [place] pullover shirt   LE Dressing   LE Dressing Adaptive Equipment Reacher   Pants Level of Assistance Contact guard   LE Dressing Where Assessed Recliner   LE Dressing Comments Pt educated  in use of AE and compensatory techniques during LB dressing tasks in order to maintain hip precautions, maximize safety and I. Pt completed return demo, donned pants with AE, SBA seated, CGA during standing portion.   Toileting   Toileting Level of Assistance Contact guard   Where Assessed   (RTS over standard toilet)   Toileting Comments Pt completed tevin care with SBA while seated following setup of toilet paper to avoid bending to obtain; anticipate CGA in stance during clothing management.   Transfer 1   Technique 1 Sit to stand;Stand to sit   Transfer Device 1 Gait belt;Walker   Transfer Level of Assistance 1 Contact guard   Trials/Comments 1 STS functional transfers at various surfaces including recliner, EOB and RTS, demo's good safety and precautions.   Toilet Transfers   Toilet Transfer From Recliner   Toilet Transfer Type To and from   Toilet Transfer to Raised toilet seat with rails  (ESPITIA placed over standard toilet this date to promote increased activity tolerance)   Toilet Transfer Technique Ambulating   Toilet Transfers Contact guard   Toilet Transfers Comments Pt ambulated within room, to and from bathroom/sink, at fww level, CGA overall with occasional cues for technique and precautions to avoid pivoting on R LE during turns with  understanding and follow through. Pt reports has been working on trying to put heel down first.   Shower Transfers   Shower Transfers Comments Pt educated in benefits of shower chair with verbal understanding.   IP OT Assessment   End of Session Communication Bedside nurse   End of Session Patient Position Up in chair;Alarm on   Inpatient Plan   OT Frequency Daily   OT Discharge Recommendations High intensity level of continued care   Equipment Recommended upon Discharge Wheeled walker  (shower chair, reacher, sock aide, LH sponge/shoe horn)     Outcome Measures:[facility] Daily Activity  Putting on and taking off regular lower body clothing: A lot  Bathing (including washing,  rinsing, drying): A little  Putting on and taking off regular upper body clothing: A little  Toileting, which includes using toilet, bedpan or urinal: A little  Taking care of personal grooming such as brushing teeth: A little  Eating Meals: None  Daily Activity - Total Score: 18  Education Documentation  No documentation found.  Education Comments  No comments found.            Goals:  Encounter Problems       Encounter Problems (Active)       OT Goals       Patient will complete functional transfers with SBA. (Progressing)       Start:  10/24/24    Expected End:  11/07/24            Patient will complete LE dressing with min A. (Progressing)       Start:  10/24/24    Expected End:  11/07/24            Patient will complete toileting with min A. (Progressing)       Start:  10/24/24    Expected End:  11/07/24

## 2024-10-28 NOTE — PROGRESS NOTES
Physical Therapy    Physical Therapy    Physical Therapy Treatment    Patient Name: Iona Hilton  MRN: [id number]  Today's Date: 10/28/2024  Time Calculation  Start Time: 0742  Stop Time: 0820  Time Calculation (min): 38 min     4101/4101-A    Assessment/Plan   PT Assessment  PT Assessment Results: Decreased strength, Decreased mobility, Pain  End of Session Communication: Bedside nurse  Assessment Comment: Patient is slow moving tis am  End of Session Patient Position: Up in chair, Alarm off, caregiver present  PT Plan  Inpatient/Swing Bed or Outpatient: Inpatient  PT Plan  Treatment/Interventions: Bed mobility, Transfer training, Gait training, Stair training, Strengthening, Endurance training, Therapeutic exercise, Therapeutic activity  PT Plan: Ongoing PT  PT Eval Only Reason: At baseline function  PT Frequency: Daily  PT Discharge Recommendations: High intensity level of continued care  Equipment Recommended upon Discharge: Wheeled walker  PT Recommended Transfer Status: Assist x2  PT - OK to Discharge: Yes (When medically allowed.)     10/28/24 0742   PT  Visit   PT Received On 10/28/24   Response to Previous Treatment Patient with no complaints from previous session.   General   Reason for Referral Ben artroplasty R LE   Referred By Mica   General Comment Pt agreeable to therapy this date, spouse present throughout entire session.   Precautions   Post-Surgical Precautions Right hip precautions   Precautions Comment Patient is able to recite 3/3 precautions   Pain Assessment   Pain Assessment 0-10   0-10 (Numeric) Pain Score 9   Pain Type Surgical pain  (with activity)   Pain Location Hip   Cognition   Overall Cognitive Status WFL   Therapeutic Exercise   Therapeutic Exercise Performed Yes   Therapeutic Exercise Activity 1 AP,  QS,  GS,  Hip Abduction,  Marchin,  LAQ,  Ball Squeezes  x 20 B with emphasis on R LE   Therapeutic Exercise Activity 2 Standing hip extension R LE   Bed Mobility   Bed Mobility  Yes   Bed Mobility 1   Bed Mobility 1 Supine to sitting   Level of Assistance 1 Minimum assistance   Ambulation/Gait Training   Ambulation/Gait Training Performed Yes   Ambulation/Gait Training 1   Surface 1 Level tile   Device 1 Rolling walker   Gait Support Devices Gait belt   Assistance 1 Contact guard   Quality of Gait 1 NBOS;Diminished heel strike;Decreased step length;Antalgic   Comments/Distance (ft) 1 90 x 2  (Patient demos  antalgic gait with decreased heel strike and foot flat R LE  tends to ambulate on the ball of R foor despite frequent verbal cues)   Transfers   Transfer Yes   Transfer 1   Transfer From 1 Bed to   Technique 1 Sit to stand;Stand to sit   Transfer Device 1 Walker;Gait belt   Transfer Level of Assistance 1 Contact guard   Trials/Comments 1 x4   PT Assessment   PT Assessment Results Decreased strength;Decreased mobility;Pain   End of Session Communication Bedside nurse   Assessment Comment Patient is slow moving tis am   End of Session Patient Position Up in chair;Alarm off, caregiver present   PT Plan   Inpatient/Swing Bed or Outpatient Inpatient     Outcome Measures:  [facility] Basic Mobility  Turning from your back to your side while in a flat bed without using bedrails: A little  Moving from lying on your back to sitting on the side of a flat bed without using bedrails: A little  Moving to and from bed to chair (including a wheelchair): A little  Standing up from a chair using your arms (e.g. wheelchair or bedside chair): A little  To walk in hospital room: A little  Climbing 3-5 steps with railing: A lot  Basic Mobility - Total Score: 17                             EDUCATION:  Outpatient Education  Education Comment: Education provided on benefits of icing, energy conservation techniques, importance of HEP and functional mobility  Education Documentation  No documentation found.  Education Comments  No comments found.        GOALS:  Encounter Problems       Encounter Problems (Active)        PT Problem       Pt will demonstrate mod I for all bed mobility   (Not Progressing)       Start:  10/24/24    Expected End:  11/07/24            Pt will demonstrate mod I for all transfers with WW  (Progressing)       Start:  10/24/24    Expected End:  11/07/24            Pt will ambulate 250 ft with WW.  (Progressing)       Start:  10/24/24    Expected End:  11/07/24            Pt will be able to negotiate 2 steps with rail.  (Not Progressing)       Start:  10/24/24    Expected End:  11/07/24               Pain - Adult

## 2024-10-28 NOTE — DISCHARGE SUMMARY
Discharge Diagnosis  Closed fracture of neck of right femur, initial encounter      Discharge summary      This patient Iona Hilton was admitted to the hospital on 10/22/2024  after undergoing Procedure(s) (LRB):  Hip Hemiarthroplasty (Right) without complications.    No significant or unexpected findings or abnormal ortho imaging were noted during the hospital stay    Hospital course      Patient tolerated surgical procedure well and there was no complications. Patient progressed adequately through their recovery during hospital stay including PT and rehabilitation.    Patient was then D/C on  to rehab  in stable condition.  Patient was instructed on the use of pain medications, the signs and symptoms of infection, and was given our number to call should they have any questions or concerns following discharge.    Based on my clinical judgment, the patient was provided with a 7-day prescription for opioid medication at 30 MED, indicated for treatment of acute pain in the setting of recent right hip hemiarthroplasty. OARRS report was run and has demonstrated an appropriate time course.  The patient has been provided with counseling pertaining to safe use of opioid medication.    Pertinent Physical Exam At Time of Discharge  Alert, oriented x 3, cooperative with examination.     Dressing is intact without drainage.  No tevin-incisional ecchymosis.  EHL, plantarflexion, dorsiflexion are 4+/5.    Sensory intact light touch in the deep/superficial/common peroneal, saphenous, tibial, sural nerve distributions.  DP and popliteal pulses are 2+ with capillary refill less than 2 seconds.  Negative Homans' sign.      Home Medications     Medication List      START taking these medications     acetaminophen 650 mg/20.3 mL solution oral liquid; Commonly known as:   Tylenol; Take 20.3 mL (650 mg) by mouth every 6 hours.   enoxaparin 40 mg/0.4 mL syringe; Commonly known as: Lovenox; Inject 0.4   mL (40 mg) under the skin once  daily for 26 days.; Start taking on:   October 29, 2024   traMADol 50 mg tablet; Commonly known as: Ultram; Take 1 tablet (50 mg)   by mouth every 8 hours if needed for moderate pain (4 - 6) or severe pain   (7 - 10).     CONTINUE taking these medications     ascorbic acid 250 mg tablet; Commonly known as: Vitamin C   calcium carbonate-vit D3-min 600 mg-10 mcg (400 unit) tablet   cholecalciferol 125 mcg (5000 UT) capsule; Commonly known as: Vitamin   D-3   lisinopril 5 mg tablet; TAKE 1 TABLET BY MOUTH EVERY DAY     STOP taking these medications     HAWTHORN NATHAN ORAL           Patient may bear weight as tolerated to operative extremity with use of walker for assistance with ambulation   Surgical dressing to be removed pod7 and incision left open to air  Lovenox daily  for DVT prophylaxis started on 10/24 and to be taken for 26 days  Follow up with surgeon in 2 weeks    Radiology images XR pelvis 1-2 views    Result Date: 10/23/2024  Interpreted By:  Tamiko Montiel, STUDY: Single view pelvis.   INDICATION: Signs/Symptoms:post op R hemiarthroplasty.   COMPARISON: 10/22/2024.   ACCESSION NUMBER(S): [id number]   ORDERING CLINICIAN: LEONOR CRAMER   FINDINGS: No acute fracture or malalignment. Status post right hip hemiarthroplasty. Hardware is intact without perihardware fractures or lucencies. Left hip joint space is maintained. Postsurgical soft tissue gas in the left gluteal region.       1. Right hip hemiarthroplasty without hardware complication.   MACRO: None.   Signed by: Tamiko Montiel 10/23/2024 7:26 PM Dictation workstation:   [id number]    ECG 12 lead    Result Date: 10/23/2024  Normal sinus rhythm Normal ECG No previous ECGs available    CT hip right wo IV contrast    Result Date: 10/23/2024  Interpreted By:  Omar Sena, STUDY: CT HIP RIGHT WO IV CONTRAST;  10/22/2024 11:47 pm   INDICATION: Signs/Symptoms:preop planning.   COMPARISON: Same-day hip radiographs   ACCESSION NUMBER(S):  [id number]   ORDERING CLINICIAN: ROGERS SINGH   TECHNIQUE: Axial CT images of the right hip with coronal and sagittal reconstructed images obtained without intravenous contrast.   FINDINGS:   There is an acute fracture of the right femoral neck mildly comminuted and impacted. Fracture demonstrates anterosuperior displacement by approximately 1/2 bone width as well as mild apex-anterior angulation and varus angulation. There is soft tissue swelling about the fracture site.   Left parasymphyseal inferior pubic ramus bone island. Large rectal stool volume.       1. Acute impacted angulated and displaced right femoral neck fracture. 2. Large rectal stool volume. Please correlate for fecal impaction.   Signed by: Omar Sena 10/23/2024 12:43 AM Dictation workstation:   [id number]    XR chest 1 view    Result Date: 10/22/2024  Interpreted By:  Adolfo Davis, STUDY: XR CHEST 1 VIEW;  10/22/2024 10:55 pm   INDICATION: Signs/Symptoms:preop.   COMPARISON: 2/13/2024   ACCESSION NUMBER(S): [id number]   ORDERING CLINICIAN: ROGERS SINGH   FINDINGS: The cardiac silhouette is normal in size. No focal airspace consolidation or pleural effusion. No pneumothorax. Scoliotic curvature of the thoracolumbar spine.       No airspace consolidation or pleural effusion.   MACRO: None   Signed by: Adolfo Davis 10/22/2024 11:48 PM Dictation workstation:   [id number]    XR hip right with pelvis when performed 2 or 3 views    Result Date: 10/22/2024  Interpreted By:  Adolfo Davis, STUDY: XR HIP RIGHT WITH PELVIS WHEN PERFORMED 2 OR 3 VIEWS; ;  10/22/2024 10:55 pm   INDICATION: Signs/Symptoms:fall, ttp.     COMPARISON: None.   ACCESSION NUMBER(S): [id number]   ORDERING CLINICIAN: DAMIAN MONROY   FINDINGS: There is acute right femoral neck fracture. There is lateral and superior displacement of distal fracture fragment. Levoconvex curvature of the imaged lower lumbar spine. There is osteopenia.       Acute right femoral  neck fracture.     MACRO: None   Signed by: Adolfo Davis 10/22/2024 11:47 PM Dictation workstation:   [id number]       Outpatient Follow-Up  No future appointments.      Aurora Woodward PA-C

## 2024-10-28 NOTE — PROGRESS NOTES
10/28/24 0839   Discharge Planning   Expected Discharge Disposition Rehab     Per bedside RN, waiting on a bed at acute rehab. There is no documentation from care coordination over the weekend. Message sent to [facility] to see if they have a bed today.   0920 Per [facility], they have a bed today. Await response for arranging transport time.   1000 Requested [facility] arrange transport for 3pm per [facility] request.     1045 Transport set for 1500. Patient, nursing, and facility aware of dc. Patient would like to call her family to notify them.

## 2024-10-28 NOTE — PROGRESS NOTES
Spiritual Care Visit    Clinical Encounter Type  Visited With: Patient  Routine Visit: Introduction  Continue Visiting: No                                            Taxonomy  Intended Effects: Promote sense of peace, Preserve dignity and respect, Meaning-making  Methods: Offer spiritual/Anglican support  Interventions: Share words of hope and inspiration, [place]    Patient shared her story as the  listened.   was a supportive presence and prayed at her request.

## 2024-10-28 NOTE — CARE PLAN
The patient's goals for the shift include sleep, pain control    The clinical goals for the shift include pain control    Pt ambulating in halls with therapy. States pain controlled.

## 2024-11-02 LAB
ATRIAL RATE: 95 BPM
P AXIS: 74 DEGREES
P OFFSET: 190 MS
P ONSET: 141 MS
PR INTERVAL: 150 MS
Q ONSET: 216 MS
QRS COUNT: 16 BEATS
QRS DURATION: 86 MS
QT INTERVAL: 374 MS
QTC CALCULATION(BAZETT): 469 MS
QTC FREDERICIA: 435 MS
R AXIS: 84 DEGREES
T AXIS: 27 DEGREES
T OFFSET: 403 MS
VENTRICULAR RATE: 95 BPM

## 2024-11-05 ENCOUNTER — TELEPHONE (OUTPATIENT)
Dept: HOME HEALTH SERVICES | Facility: HOME HEALTH | Age: 74
End: 2024-11-05
Payer: MEDICARE

## 2024-11-05 NOTE — TELEPHONE ENCOUNTER
Hailey Stern MD       [facility]  received a  home care referral / orders for Iona for SN, PT, OT and HHA from [facility]  .  Please advise if you are agreeable to signing and following for HHC services?  Our Clinicians will be sending the Plan of Care to you for review and approval. They will reach out for any appropriate orders required to provide home care services for the patient. We are not able to initiate HHC services without a following provider.??     [facility] care clinicians [facility] also [facility]     Thank you and we would be happy to answer     GOMEZ KNOX LPN   REFERRAL

## 2024-11-06 ENCOUNTER — DOCUMENTATION (OUTPATIENT)
Dept: HOME HEALTH SERVICES | Facility: HOME HEALTH | Age: 74
End: 2024-11-06
Payer: MEDICARE

## 2024-11-06 ENCOUNTER — HOME HEALTH ADMISSION (OUTPATIENT)
Dept: HOME HEALTH SERVICES | Facility: HOME HEALTH | Age: 74
End: 2024-11-06
Payer: MEDICARE

## 2024-11-06 NOTE — HH CARE COORDINATION
Home Care received a Referral for Nursing, Physical Therapy, Occupational Therapy, and Home Health Aide. We have processed the referral for a Start of Care on 24-48 HOURS .     If you have any questions or concerns, please feel free to contact us at [phone]. Follow the prompts, enter your five digit zip code, and you will be directed to your care team on WEST 2.

## 2024-11-07 ENCOUNTER — OFFICE VISIT (OUTPATIENT)
Dept: PRIMARY CARE | Facility: CLINIC | Age: 74
End: 2024-11-07
Payer: MEDICARE

## 2024-11-07 ENCOUNTER — LAB (OUTPATIENT)
Dept: LAB | Facility: LAB | Age: 74
End: 2024-11-07
Payer: MEDICARE

## 2024-11-07 ENCOUNTER — HOME CARE VISIT (OUTPATIENT)
Dept: HOME HEALTH SERVICES | Facility: HOME HEALTH | Age: 74
End: 2024-11-07
Payer: MEDICARE

## 2024-11-07 ENCOUNTER — HOSPITAL ENCOUNTER (OUTPATIENT)
Dept: RADIOLOGY | Facility: CLINIC | Age: 74
Discharge: HOME | End: 2024-11-07
Payer: MEDICARE

## 2024-11-07 ENCOUNTER — APPOINTMENT (OUTPATIENT)
Dept: RADIOLOGY | Facility: CLINIC | Age: 74
End: 2024-11-07
Payer: MEDICARE

## 2024-11-07 VITALS
HEART RATE: 114 BPM | WEIGHT: 124 LBS | SYSTOLIC BLOOD PRESSURE: 156 MMHG | HEIGHT: 67 IN | DIASTOLIC BLOOD PRESSURE: 76 MMHG | BODY MASS INDEX: 19.46 KG/M2 | OXYGEN SATURATION: 99 % | TEMPERATURE: 97.9 F

## 2024-11-07 VITALS
RESPIRATION RATE: 18 BRPM | HEART RATE: 101 BPM | SYSTOLIC BLOOD PRESSURE: 110 MMHG | DIASTOLIC BLOOD PRESSURE: 74 MMHG | TEMPERATURE: 97.7 F | OXYGEN SATURATION: 96 %

## 2024-11-07 DIAGNOSIS — R79.89 ABNORMAL CBC: ICD-10-CM

## 2024-11-07 DIAGNOSIS — R00.0 TACHYCARDIA: ICD-10-CM

## 2024-11-07 DIAGNOSIS — S72.001D CLOSED FRACTURE OF NECK OF RIGHT FEMUR WITH ROUTINE HEALING, SUBSEQUENT ENCOUNTER: ICD-10-CM

## 2024-11-07 DIAGNOSIS — R60.0 LEG EDEMA, RIGHT: ICD-10-CM

## 2024-11-07 DIAGNOSIS — M80.00XD AGE-RELATED OSTEOPOROSIS WITH CURRENT PATHOLOGICAL FRACTURE WITH ROUTINE HEALING: ICD-10-CM

## 2024-11-07 DIAGNOSIS — R60.0 LEG EDEMA, RIGHT: Primary | ICD-10-CM

## 2024-11-07 DIAGNOSIS — Z76.89 ENCOUNTER FOR SUPPORT AND COORDINATION OF TRANSITION OF CARE: Primary | ICD-10-CM

## 2024-11-07 LAB
BASOPHILS # BLD AUTO: 0.04 X10*3/UL (ref 0–0.1)
BASOPHILS NFR BLD AUTO: 0.4 %
EOSINOPHIL # BLD AUTO: 0.12 X10*3/UL (ref 0–0.4)
EOSINOPHIL NFR BLD AUTO: 1.3 %
ERYTHROCYTE [DISTWIDTH] IN BLOOD BY AUTOMATED COUNT: 13 % (ref 11.5–14.5)
HCT VFR BLD AUTO: 32.4 % (ref 36–46)
HGB BLD-MCNC: 10.7 G/DL (ref 12–16)
IMM GRANULOCYTES # BLD AUTO: 0.03 X10*3/UL (ref 0–0.5)
IMM GRANULOCYTES NFR BLD AUTO: 0.3 % (ref 0–0.9)
LYMPHOCYTES # BLD AUTO: 1.23 X10*3/UL (ref 0.8–3)
LYMPHOCYTES NFR BLD AUTO: 13.3 %
MCH RBC QN AUTO: 30.5 PG (ref 26–34)
MCHC RBC AUTO-ENTMCNC: 33 G/DL (ref 32–36)
MCV RBC AUTO: 92 FL (ref 80–100)
MONOCYTES # BLD AUTO: 0.59 X10*3/UL (ref 0.05–0.8)
MONOCYTES NFR BLD AUTO: 6.4 %
NEUTROPHILS # BLD AUTO: 7.21 X10*3/UL (ref 1.6–5.5)
NEUTROPHILS NFR BLD AUTO: 78.3 %
NRBC BLD-RTO: 0 /100 WBCS (ref 0–0)
PLATELET # BLD AUTO: 420 X10*3/UL (ref 150–450)
RBC # BLD AUTO: 3.51 X10*6/UL (ref 4–5.2)
WBC # BLD AUTO: 9.2 X10*3/UL (ref 4.4–11.3)

## 2024-11-07 PROCEDURE — 85025 COMPLETE CBC W/AUTO DIFF WBC: CPT

## 2024-11-07 PROCEDURE — 3077F SYST BP >= 140 MM HG: CPT | Performed by: NURSE PRACTITIONER

## 2024-11-07 PROCEDURE — 93000 ELECTROCARDIOGRAM COMPLETE: CPT | Performed by: NURSE PRACTITIONER

## 2024-11-07 PROCEDURE — 36415 COLL VENOUS BLD VENIPUNCTURE: CPT

## 2024-11-07 PROCEDURE — G2211 COMPLEX E/M VISIT ADD ON: HCPCS | Performed by: NURSE PRACTITIONER

## 2024-11-07 PROCEDURE — 1123F ACP DISCUSS/DSCN MKR DOCD: CPT | Performed by: NURSE PRACTITIONER

## 2024-11-07 PROCEDURE — 93971 EXTREMITY STUDY: CPT

## 2024-11-07 PROCEDURE — G0299 HHS/HOSPICE OF RN EA 15 MIN: HCPCS | Mod: HHH

## 2024-11-07 PROCEDURE — 99215 OFFICE O/P EST HI 40 MIN: CPT | Performed by: NURSE PRACTITIONER

## 2024-11-07 PROCEDURE — 169592 NO-PAY CLAIM PROCEDURE

## 2024-11-07 PROCEDURE — 1160F RVW MEDS BY RX/DR IN RCRD: CPT | Performed by: NURSE PRACTITIONER

## 2024-11-07 PROCEDURE — 87086 URINE CULTURE/COLONY COUNT: CPT

## 2024-11-07 PROCEDURE — 2550000001 HC RX 255 CONTRASTS: Performed by: NURSE PRACTITIONER

## 2024-11-07 PROCEDURE — 71275 CT ANGIOGRAPHY CHEST: CPT

## 2024-11-07 PROCEDURE — 3078F DIAST BP <80 MM HG: CPT | Performed by: NURSE PRACTITIONER

## 2024-11-07 PROCEDURE — 1159F MED LIST DOCD IN RCRD: CPT | Performed by: NURSE PRACTITIONER

## 2024-11-07 PROCEDURE — 1111F DSCHRG MED/CURRENT MED MERGE: CPT | Performed by: NURSE PRACTITIONER

## 2024-11-07 PROCEDURE — 1036F TOBACCO NON-USER: CPT | Performed by: NURSE PRACTITIONER

## 2024-11-07 PROCEDURE — 3008F BODY MASS INDEX DOCD: CPT | Performed by: NURSE PRACTITIONER

## 2024-11-07 ASSESSMENT — PATIENT HEALTH QUESTIONNAIRE - PHQ9
1. LITTLE INTEREST OR PLEASURE IN DOING THINGS: NOT AT ALL
2. FEELING DOWN, DEPRESSED OR HOPELESS: NOT AT ALL
SUM OF ALL RESPONSES TO PHQ9 QUESTIONS 1 AND 2: 0

## 2024-11-07 ASSESSMENT — ENCOUNTER SYMPTOMS
PAIN LOCATION: RIGHT HIP
LOWEST PAIN SEVERITY IN PAST 24 HOURS: 0/10
DENIES PAIN: 1
CHANGE IN APPETITE: UNCHANGED
APPETITE LEVEL: GOOD
SUBJECTIVE PAIN PROGRESSION: UNCHANGED
PERSON REPORTING PAIN: PATIENT
PAIN SEVERITY GOAL: 0/10
LIMITED RANGE OF MOTION: 1
HIGHEST PAIN SEVERITY IN PAST 24 HOURS: 3/10

## 2024-11-07 ASSESSMENT — ACTIVITIES OF DAILY LIVING (ADL)
OASIS_M1830: 03
ENTERING_EXITING_HOME: MODERATE ASSIST
AMBULATION ASSISTANCE: ONE PERSON
CURRENT_FUNCTION: ONE PERSON

## 2024-11-07 ASSESSMENT — LIFESTYLE VARIABLES: SMOKING_STATUS: 0

## 2024-11-07 NOTE — ASSESSMENT & PLAN NOTE
Last dose lovenox 11/3  Hosp notes indicate continue x 26 days   Will try to contact surgeon ()  She will send me name of surgeon she is to follow up with  - not yet scheduled

## 2024-11-07 NOTE — ASSESSMENT & PLAN NOTE
Admit date: 10/22/24  Discharge date: 10/28/24   Admit: 10/28/24 [place] [facility]  Discharge: 11/4/24 home  Discharge DX: Closed fracture of base of neck of right femur

## 2024-11-07 NOTE — PROGRESS NOTES
"Patient: Iona Hilton  : 1950  PCP: Hailey Stern MD  MRN: [id number]  Program: No linked episodes    Admit date: 10/22/24  Discharge date: 10/28/24   Admit: 10/28/24 [place] [facility]  Discharge: 24 home  Discharge DX: Closed fracture of base of neck of right femur     Iona Hilton is a 74 y.o. female presenting today for follow-up after being discharged from the hospital 3 days ago. The main problem requiring admission was due Closed fracture of base of neck of right femur. The discharge summary and/or Transitional Care Management documentation was reviewed. Medication reconciliation was performed as indicated via the \"Wes as Reviewed\" timestamp.     Iona Hilton was NOT contacted by Transitional Care Management services two days after her discharge. This encounter and supporting documentation was reviewed.    The complexity of medical decision making for this patient's transitional care is high.    [facility]   Nurse was out this morning  - was unable to remove staples     Pain mgt  Did take tramadol this morning as she was coming to this appt  Tylenol usually 2-3 x daily  Pain much improved  Worst since coming home 2/10  Now 0/10     Lovenox was dc'd upon discharge from rehab  Last lovenox injection 11/3/24 at rehab    UTI  Completed abx  She was asymptomatic    Anemia  Has hx same  Has been tachy with anemia previous    Tachy  Asymptomatic    24 [facility] rehab copied:  Assessment & Plan:    Fall  R Femur Fx s/p Arthroplasty R Hip  Pain  -Per PM&R  -PT/OT/SLP   tolerating therapy     #Tachycardia:  130-160 this AM after getting up to restroom. Denied cp, sob, dizziness. BP was stable during this time. Manual pulse at bedside 100 at rest, regular rhythm, no hx afib. Mild RLE swelling, idalmis after therapy, this is the leg she had surgery. On lovenox. Recent acute cystitis      -check orthostat blood pressure  -EKG  -increase PO hydration, if orthostat + give " bolus  -Consider US of RLE, and CTPE if tachycardia persists     HTN  -Monitor VS  -Lisinopril   11/1 VSS     Acute Cystitis  Congenital Singular Kidney  -Monitor labs     Anemia  -Monitor CBC     Constipation  -Colace, Miralax      DVT prophylaxis  -Lovenox   Hospital course copied:   Patient tolerated surgical procedure well and there was no complications. Patient progressed adequately through their recovery during hospital stay including PT and rehabilitation.     Patient was then D/C on  to rehab  in stable condition.  Patient was instructed on the use of pain medications, the signs and symptoms of infection, and was given our number to call should they have any questions or concerns following discharge.     Based on my clinical judgment, the patient was provided with a 7-day prescription for opioid medication at 30 MED, indicated for treatment of acute pain in the setting of recent right hip hemiarthroplasty. OARRS report was run and has demonstrated an appropriate time course.  The patient has been provided with counseling pertaining to safe use of opioid medication.    Patient may bear weight as tolerated to operative extremity with use of walker for assistance with ambulation   Surgical dressing to be removed pod7 and incision left open to air  Lovenox daily  for DVT prophylaxis started on 10/24 and to be taken for 26 days  Follow up with surgeon in 2 weeks    Labs copied:  WBC  3.70 - 11.00 k/uL 7.78   RBC  3.90 - 5.20 m/uL 3.2 Low    Hemoglobin  11.5 - 15.5 g/dL 9.9 Low    Hematocrit  36.0 - 46.0 % 29.1 Low    MCV  80.0 - 100.0 fL 90.9   MCH  26.0 - 34.0 pg 30.9   MCHC  30.5 - 36.0 g/dL 34   RDW-CV  11.5 - 15.0 % 12.6   Platelets  150 - 400 k/uL 362   MPV  9.0 - 12.7 fL 9.7   Neutrophils Relatives  % 64.8   Neutrophils Absolute  1.45 - 7.50 k/uL 5.04   Lymphocytes % by manual count  % 17.6   Lymphocytes Absolute  1.00 - 4.00 k/uL 1.37   Monocytes % by manual count=  % 9.8   Monocytes Absolute  <0.87 k/uL  0.76   Eosinophils Manual  % 6.9   Eosinophils Absolute  <0.46 k/uL 0.54 High    Basophils manual  % 0.4   Basophils Absolute  <0.11 k/uL 0.03   Immature grans %  % 0.5   Grans (Absolute)  <0.10 k/uL 0.04   NRBC % Auto  /100 WBC 0   Absolute NRBC  <0.01 k/uL <0.01   Differential Type Auto     Urine Culture 20,000 - 80,000 Escherichia coli Abnormal         Resulting Agency: [facility]     Susceptibility     Escherichia coli     MICROSCAN    Amoxicillin/Clavulanate Susceptible    Ampicillin Resistant    Ampicillin/Sulbactam Intermediate    Cefazolin Susceptible    Cefazolin (uncomplicated UTIs only) Susceptible    Ciprofloxacin Susceptible    Gentamicin Susceptible    Nitrofurantoin Susceptible    Piperacillin/Tazobactam Susceptible    Trimethoprim/Sulfamethoxazole Susceptible         Imaging copied:   10/22/24 XR hip:  There is acute right femoral neck fracture. There is lateral and  superior displacement of distal fracture fragment. Levoconvex  curvature of the imaged lower lumbar spine. There is osteopenia.    10/22/24 CT hip:  1. Acute impacted angulated and displaced right femoral neck fracture.  2. Large rectal stool volume. Please correlate for fecal impaction.    10/22/24 XR chest:  No airspace consolidation or pleural effusion.        10/23/24 XR pelvis:  No acute fracture or malalignment.  Status post right hip hemiarthroplasty.  Hardware is intact without perihardware fractures or lucencies.  Left hip joint space is maintained.  Postsurgical soft tissue gas in the left gluteal region.    Review of Systems   All other systems reviewed and are negative.      Physical Exam  Constitutional:       General: She is not in acute distress.  HENT:      Head: Normocephalic.      Nose: Nose normal.      Mouth/Throat:      Mouth: Mucous membranes are moist.   Eyes:      Conjunctiva/sclera: Conjunctivae normal.   Neck:      Vascular: No carotid bruit.   Cardiovascular:      Rate and Rhythm: Regular rhythm. Tachycardia  present.      Pulses: Normal pulses.   Pulmonary:      Effort: Pulmonary effort is normal.      Breath sounds: Normal breath sounds.   Abdominal:      General: Bowel sounds are normal.      Palpations: Abdomen is soft.   Musculoskeletal:      Cervical back: Normal range of motion.      Comments: In transport chair   RLE 1+ pitting edema  Surgical incision well approximated with staples in place    Neurological:      General: No focal deficit present.      Mental Status: She is alert.   Psychiatric:         Mood and Affect: Mood normal.         Problem List Items Addressed This Visit          Medium    Age-related osteoporosis with current pathological fracture with routine healing    Overview     10/22/24 XR hip:  There is acute right femoral neck fracture. There is lateral and  superior displacement of distal fracture fragment. Levoconvex  curvature of the imaged lower lumbar spine. There is osteopenia.    10/22/24 CT hip:  1. Acute impacted angulated and displaced right femoral neck fracture.  2. Large rectal stool volume. Please correlate for fecal impaction.         Closed fracture of neck of right femur    Overview     10/22/24 XR hip:  There is acute right femoral neck fracture. There is lateral and  superior displacement of distal fracture fragment. Levoconvex  curvature of the imaged lower lumbar spine. There is osteopenia.    10/22/24 CT hip:  1. Acute impacted angulated and displaced right femoral neck fracture.  2. Large rectal stool volume. Please correlate for fecal impaction.    10/23/24 XR pelvis:  No acute fracture or malalignment.  Status post right hip hemiarthroplasty.  Hardware is intact without perihardware fractures or lucencies.  Left hip joint space is maintained.  Postsurgical soft tissue gas in the left gluteal region.         Current Assessment & Plan     Last dose lovenox 11/3  Hosp notes indicate continue x 26 days   Will try to contact surgeon ()  She will send me name of surgeon she  is to follow up with  - not yet scheduled          Encounter for support and coordination of transition of care - Primary    Current Assessment & Plan     Admit date: 10/22/24  Discharge date: 10/28/24   Admit: 10/28/24 [place] [facility]  Discharge: 11/4/24 home  Discharge DX: Closed fracture of base of neck of right femur               Other Visit Diagnoses       Abnormal CBC        recheck for upward trend  hx anemia    Relevant Orders    CBC and Auto Differential    Tachycardia        EKG  labs  - anemia was not rechecked  recheck urine  CT PE and US DVT    Relevant Orders    ECG 12 lead (Clinic Performed) (Completed)    Urine Culture    Vascular US Lower Extremity Venous Duplex Right    CT angio chest for pulmonary embolism    Leg edema, right        not usual s/p ortho surg  she was tachy at rehab and remains tachy here today  last lovenox 11/3/24  - hosp notes say 26 days of lovenox  concern DVT  get US    Relevant Orders    Vascular US Lower Extremity Venous Duplex Right    CT angio chest for pulmonary embolism             Family History   Problem Relation Name Age of Onset    Other (heart valve replct) Mother  90    Heart attack Father  47    Hypertension Sister         No data recorded    No follow-ups on file.

## 2024-11-08 ENCOUNTER — APPOINTMENT (OUTPATIENT)
Dept: RADIOLOGY | Facility: CLINIC | Age: 74
End: 2024-11-08
Payer: MEDICARE

## 2024-11-08 ENCOUNTER — LAB (OUTPATIENT)
Dept: LAB | Facility: LAB | Age: 74
End: 2024-11-08
Payer: MEDICARE

## 2024-11-08 ENCOUNTER — HOME CARE VISIT (OUTPATIENT)
Dept: HOME HEALTH SERVICES | Facility: HOME HEALTH | Age: 74
End: 2024-11-08
Payer: MEDICARE

## 2024-11-08 ENCOUNTER — TELEPHONE (OUTPATIENT)
Dept: PRIMARY CARE | Facility: CLINIC | Age: 74
End: 2024-11-08
Payer: MEDICARE

## 2024-11-08 ENCOUNTER — TELEPHONE (OUTPATIENT)
Dept: ORTHOPEDIC SURGERY | Facility: CLINIC | Age: 74
End: 2024-11-08

## 2024-11-08 VITALS — DIASTOLIC BLOOD PRESSURE: 76 MMHG | SYSTOLIC BLOOD PRESSURE: 114 MMHG | HEART RATE: 83 BPM | OXYGEN SATURATION: 100 %

## 2024-11-08 VITALS
SYSTOLIC BLOOD PRESSURE: 90 MMHG | HEART RATE: 83 BPM | DIASTOLIC BLOOD PRESSURE: 70 MMHG | OXYGEN SATURATION: 100 % | TEMPERATURE: 98.1 F

## 2024-11-08 DIAGNOSIS — D64.9 ANEMIA, UNSPECIFIED TYPE: ICD-10-CM

## 2024-11-08 DIAGNOSIS — S72.001A CLOSED FRACTURE OF NECK OF RIGHT FEMUR, INITIAL ENCOUNTER: ICD-10-CM

## 2024-11-08 DIAGNOSIS — D64.9 ANEMIA, UNSPECIFIED TYPE: Primary | ICD-10-CM

## 2024-11-08 LAB
FERRITIN SERPL-MCNC: 111 NG/ML (ref 8–150)
IRON SATN MFR SERPL: 12 % (ref 25–45)
IRON SERPL-MCNC: 36 UG/DL (ref 35–150)
TIBC SERPL-MCNC: 305 UG/DL (ref 240–445)
UIBC SERPL-MCNC: 269 UG/DL (ref 110–370)
VIT B12 SERPL-MCNC: 487 PG/ML (ref 211–911)

## 2024-11-08 PROCEDURE — G0152 HHCP-SERV OF OT,EA 15 MIN: HCPCS | Mod: HHH

## 2024-11-08 PROCEDURE — 36415 COLL VENOUS BLD VENIPUNCTURE: CPT

## 2024-11-08 PROCEDURE — 82728 ASSAY OF FERRITIN: CPT

## 2024-11-08 PROCEDURE — 82607 VITAMIN B-12: CPT

## 2024-11-08 PROCEDURE — 83540 ASSAY OF IRON: CPT

## 2024-11-08 PROCEDURE — G0299 HHS/HOSPICE OF RN EA 15 MIN: HCPCS | Mod: HHH

## 2024-11-08 PROCEDURE — 83550 IRON BINDING TEST: CPT

## 2024-11-08 PROCEDURE — G0151 HHCP-SERV OF PT,EA 15 MIN: HCPCS | Mod: HHH

## 2024-11-08 RX ORDER — ENOXAPARIN SODIUM 100 MG/ML
40 INJECTION SUBCUTANEOUS DAILY
Qty: 17 EACH | Refills: 0 | Status: SHIPPED | OUTPATIENT
Start: 2024-11-08 | End: 2024-11-25

## 2024-11-08 ASSESSMENT — ENCOUNTER SYMPTOMS
HIGHEST PAIN SEVERITY IN PAST 24 HOURS: 2/10
PAIN LOCATION: RIGHT HIP
PERSON REPORTING PAIN: PATIENT
PAIN LOCATION - EXACERBATING FACTORS: MOVEMENT
PAIN: 1
LOWEST PAIN SEVERITY IN PAST 24 HOURS: 0/10
PERSON REPORTING PAIN: PATIENT
SUBJECTIVE PAIN PROGRESSION: GRADUALLY IMPROVING
LIMITED RANGE OF MOTION: 1
PAIN: 1
PAIN LOCATION - PAIN SEVERITY: 1/10
PAIN LOCATION - RELIEVING FACTORS: REST/MEDS
PAIN LOCATION: RIGHT LEG
PAIN LOCATION - PAIN FREQUENCY: FREQUENT
PAIN LOCATION - PAIN SEVERITY: 1/10
MUSCLE WEAKNESS: 1

## 2024-11-08 ASSESSMENT — ACTIVITIES OF DAILY LIVING (ADL)
AMBULATION ASSISTANCE: STAND BY ASSIST
CURRENT_FUNCTION: STAND BY ASSIST
AMBULATION_DISTANCE/DURATION_TOLERATED: 100 FT
AMBULATION ASSISTANCE: 1
BATHING_CURRENT_FUNCTION: STAND BY ASSIST
CURRENT_FUNCTION: ONE PERSON
TOILETING: INDEPENDENT
AMBULATION ASSISTANCE ON FLAT SURFACES: 1
PHYSICAL TRANSFERS ASSESSED: 1
DRESSING_LB_CURRENT_FUNCTION: SUPERVISION
AMBULATION ASSISTANCE: ONE PERSON
DRESSING_UB_CURRENT_FUNCTION: INDEPENDENT
TOILETING: 1
BATHING ASSESSED: 1
PREPARING MEALS: NEEDS ASSISTANCE

## 2024-11-08 NOTE — TELEPHONE ENCOUNTER
Pt called this morning she was referred to Dominick Robledo DO [facility] ph

## 2024-11-08 NOTE — TELEPHONE ENCOUNTER
Patient called and left a VM.  She stated she was out of the Lovenox and her PCP wants her to continue this for the whole 26 days post op.  Please contact her at [phone].

## 2024-11-08 NOTE — TELEPHONE ENCOUNTER
----- Message from Hailey Stern sent at 11/8/2024 11:26 AM EST -----  This is urgent  Start lovenox as rx'd by ortho  Pt also  needs to call ortho and confirm they want her on this  Stay on it until she hears from them   Let me know    I cannot understand why dc'd  ----- Message -----  From: Alanna VIVIAN Albrecht, NESSA-CNP  Sent: 11/8/2024   8:55 AM EST  To: Hailey Stern MD    I did her TCM yesterday. Hosp dc says lovenox x 26 days. Rehab dc'd it. I tried getting a hold of Dr Nino (he was on call surgeon) but it appears he's unavailable. I was concerned for DVT/PE yesterday as she was tachy (was also on 11/2 - last rehab note). Thankfully both negative. She is anemic (not unexpected with surgery) and trending upward.  Patient was going to give us a call with the name of ortho surgeon she was to follow up with - thought I'd try contacting them for lovenox advice. We tried getting the surgeon name from her yesterday but she wasn't home to give it to us.  I'm signing off for now. Could you keep an eye out for surgeon name and see what they want us to do with this?  Thanks

## 2024-11-08 NOTE — TELEPHONE ENCOUNTER
Patient also inquired about a follow up visit.  She does not have a post op appointment.  She did see Dr. ANIYA Birch in the hospital.  An appointment will be made for her to follow up with Dr. Perez Birch.

## 2024-11-09 LAB — BACTERIA UR CULT: NORMAL

## 2024-11-10 ASSESSMENT — ENCOUNTER SYMPTOMS
PAIN LOCATION - PAIN SEVERITY: 3/10
MUSCLE WEAKNESS: 1
LIMITED RANGE OF MOTION: 1
CHANGE IN APPETITE: UNCHANGED
APPETITE LEVEL: GOOD
PERSON REPORTING PAIN: PATIENT
PAIN SEVERITY GOAL: 0/10
PAIN LOCATION: RIGHT HIP
PAIN: 1
HIGHEST PAIN SEVERITY IN PAST 24 HOURS: 4/10
SUBJECTIVE PAIN PROGRESSION: UNCHANGED

## 2024-11-10 ASSESSMENT — ACTIVITIES OF DAILY LIVING (ADL)
AMBULATION ASSISTANCE: ONE PERSON
CURRENT_FUNCTION: ONE PERSON

## 2024-11-11 ENCOUNTER — HOME CARE VISIT (OUTPATIENT)
Dept: HOME HEALTH SERVICES | Facility: HOME HEALTH | Age: 74
End: 2024-11-11
Payer: MEDICARE

## 2024-11-11 VITALS
TEMPERATURE: 98.9 F | DIASTOLIC BLOOD PRESSURE: 75 MMHG | HEART RATE: 114 BPM | OXYGEN SATURATION: 99 % | SYSTOLIC BLOOD PRESSURE: 108 MMHG

## 2024-11-11 PROCEDURE — G0151 HHCP-SERV OF PT,EA 15 MIN: HCPCS | Mod: HHH

## 2024-11-11 ASSESSMENT — ENCOUNTER SYMPTOMS
PERSON REPORTING PAIN: PATIENT
PAIN LOCATION: RIGHT HIP
PAIN: 1
HIGHEST PAIN SEVERITY IN PAST 24 HOURS: 7/10
PAIN LOCATION - PAIN SEVERITY: 0/10

## 2024-11-13 ENCOUNTER — HOSPITAL ENCOUNTER (EMERGENCY)
Facility: HOSPITAL | Age: 74
Discharge: HOME | End: 2024-11-13
Attending: STUDENT IN AN ORGANIZED HEALTH CARE EDUCATION/TRAINING PROGRAM
Payer: MEDICARE

## 2024-11-13 ENCOUNTER — APPOINTMENT (OUTPATIENT)
Dept: RADIOLOGY | Facility: CLINIC | Age: 74
End: 2024-11-13
Payer: MEDICARE

## 2024-11-13 ENCOUNTER — HOSPITAL ENCOUNTER (OUTPATIENT)
Dept: CARDIOLOGY | Facility: HOSPITAL | Age: 74
Discharge: HOME | End: 2024-11-13
Payer: MEDICARE

## 2024-11-13 ENCOUNTER — HOME CARE VISIT (OUTPATIENT)
Dept: HOME HEALTH SERVICES | Facility: HOME HEALTH | Age: 74
End: 2024-11-13
Payer: MEDICARE

## 2024-11-13 ENCOUNTER — APPOINTMENT (OUTPATIENT)
Dept: RADIOLOGY | Facility: HOSPITAL | Age: 74
End: 2024-11-13
Payer: MEDICARE

## 2024-11-13 VITALS
RESPIRATION RATE: 16 BRPM | HEART RATE: 82 BPM | WEIGHT: 124 LBS | SYSTOLIC BLOOD PRESSURE: 152 MMHG | OXYGEN SATURATION: 100 % | DIASTOLIC BLOOD PRESSURE: 77 MMHG | HEIGHT: 67 IN | TEMPERATURE: 97.7 F | BODY MASS INDEX: 19.46 KG/M2

## 2024-11-13 VITALS — DIASTOLIC BLOOD PRESSURE: 67 MMHG | SYSTOLIC BLOOD PRESSURE: 119 MMHG | OXYGEN SATURATION: 99 % | HEART RATE: 127 BPM

## 2024-11-13 DIAGNOSIS — R00.0 TACHYCARDIA: Primary | ICD-10-CM

## 2024-11-13 LAB
ALBUMIN SERPL BCP-MCNC: 4.2 G/DL (ref 3.4–5)
ALP SERPL-CCNC: 68 U/L (ref 33–136)
ALT SERPL W P-5'-P-CCNC: 17 U/L (ref 7–45)
ANION GAP SERPL CALC-SCNC: 14 MMOL/L (ref 10–20)
AST SERPL W P-5'-P-CCNC: 18 U/L (ref 9–39)
BASOPHILS # BLD AUTO: 0.04 X10*3/UL (ref 0–0.1)
BASOPHILS NFR BLD AUTO: 0.5 %
BILIRUB SERPL-MCNC: 0.4 MG/DL (ref 0–1.2)
BUN SERPL-MCNC: 14 MG/DL (ref 6–23)
CALCIUM SERPL-MCNC: 9.6 MG/DL (ref 8.6–10.3)
CARDIAC TROPONIN I PNL SERPL HS: 3 NG/L (ref 0–13)
CARDIAC TROPONIN I PNL SERPL HS: 3 NG/L (ref 0–13)
CHLORIDE SERPL-SCNC: 100 MMOL/L (ref 98–107)
CO2 SERPL-SCNC: 26 MMOL/L (ref 21–32)
CREAT SERPL-MCNC: 0.77 MG/DL (ref 0.5–1.05)
EGFRCR SERPLBLD CKD-EPI 2021: 82 ML/MIN/1.73M*2
EOSINOPHIL # BLD AUTO: 0.34 X10*3/UL (ref 0–0.4)
EOSINOPHIL NFR BLD AUTO: 4.2 %
ERYTHROCYTE [DISTWIDTH] IN BLOOD BY AUTOMATED COUNT: 12.5 % (ref 11.5–14.5)
GLUCOSE SERPL-MCNC: 95 MG/DL (ref 74–99)
HCT VFR BLD AUTO: 34.5 % (ref 36–46)
HGB BLD-MCNC: 11.3 G/DL (ref 12–16)
IMM GRANULOCYTES # BLD AUTO: 0.02 X10*3/UL (ref 0–0.5)
IMM GRANULOCYTES NFR BLD AUTO: 0.2 % (ref 0–0.9)
LYMPHOCYTES # BLD AUTO: 1.88 X10*3/UL (ref 0.8–3)
LYMPHOCYTES NFR BLD AUTO: 23.4 %
MAGNESIUM SERPL-MCNC: 2.08 MG/DL (ref 1.6–2.4)
MCH RBC QN AUTO: 30.2 PG (ref 26–34)
MCHC RBC AUTO-ENTMCNC: 32.8 G/DL (ref 32–36)
MCV RBC AUTO: 92 FL (ref 80–100)
MONOCYTES # BLD AUTO: 0.58 X10*3/UL (ref 0.05–0.8)
MONOCYTES NFR BLD AUTO: 7.2 %
NEUTROPHILS # BLD AUTO: 5.19 X10*3/UL (ref 1.6–5.5)
NEUTROPHILS NFR BLD AUTO: 64.5 %
NRBC BLD-RTO: 0 /100 WBCS (ref 0–0)
PLATELET # BLD AUTO: 310 X10*3/UL (ref 150–450)
POTASSIUM SERPL-SCNC: 3.8 MMOL/L (ref 3.5–5.3)
PROT SERPL-MCNC: 7.2 G/DL (ref 6.4–8.2)
RBC # BLD AUTO: 3.74 X10*6/UL (ref 4–5.2)
SODIUM SERPL-SCNC: 136 MMOL/L (ref 136–145)
TSH SERPL-ACNC: 2.95 MIU/L (ref 0.44–3.98)
WBC # BLD AUTO: 8.1 X10*3/UL (ref 4.4–11.3)

## 2024-11-13 PROCEDURE — 83735 ASSAY OF MAGNESIUM: CPT | Performed by: EMERGENCY MEDICINE

## 2024-11-13 PROCEDURE — 84484 ASSAY OF TROPONIN QUANT: CPT | Performed by: STUDENT IN AN ORGANIZED HEALTH CARE EDUCATION/TRAINING PROGRAM

## 2024-11-13 PROCEDURE — 36415 COLL VENOUS BLD VENIPUNCTURE: CPT | Performed by: EMERGENCY MEDICINE

## 2024-11-13 PROCEDURE — 36415 COLL VENOUS BLD VENIPUNCTURE: CPT

## 2024-11-13 PROCEDURE — 99285 EMERGENCY DEPT VISIT HI MDM: CPT | Performed by: STUDENT IN AN ORGANIZED HEALTH CARE EDUCATION/TRAINING PROGRAM

## 2024-11-13 PROCEDURE — 80053 COMPREHEN METABOLIC PANEL: CPT | Performed by: STUDENT IN AN ORGANIZED HEALTH CARE EDUCATION/TRAINING PROGRAM

## 2024-11-13 PROCEDURE — 93005 ELECTROCARDIOGRAM TRACING: CPT

## 2024-11-13 PROCEDURE — 71045 X-RAY EXAM CHEST 1 VIEW: CPT | Mod: FOREIGN READ | Performed by: RADIOLOGY

## 2024-11-13 PROCEDURE — 83735 ASSAY OF MAGNESIUM: CPT | Performed by: STUDENT IN AN ORGANIZED HEALTH CARE EDUCATION/TRAINING PROGRAM

## 2024-11-13 PROCEDURE — 84484 ASSAY OF TROPONIN QUANT: CPT | Performed by: EMERGENCY MEDICINE

## 2024-11-13 PROCEDURE — 99284 EMERGENCY DEPT VISIT MOD MDM: CPT | Mod: 25

## 2024-11-13 PROCEDURE — 85025 COMPLETE CBC W/AUTO DIFF WBC: CPT | Performed by: EMERGENCY MEDICINE

## 2024-11-13 PROCEDURE — G0151 HHCP-SERV OF PT,EA 15 MIN: HCPCS | Mod: HHH

## 2024-11-13 PROCEDURE — 85025 COMPLETE CBC W/AUTO DIFF WBC: CPT | Performed by: STUDENT IN AN ORGANIZED HEALTH CARE EDUCATION/TRAINING PROGRAM

## 2024-11-13 PROCEDURE — 84075 ASSAY ALKALINE PHOSPHATASE: CPT | Performed by: EMERGENCY MEDICINE

## 2024-11-13 PROCEDURE — 71045 X-RAY EXAM CHEST 1 VIEW: CPT

## 2024-11-13 PROCEDURE — 84443 ASSAY THYROID STIM HORMONE: CPT

## 2024-11-13 ASSESSMENT — PAIN SCALES - GENERAL
PAINLEVEL_OUTOF10: 0 - NO PAIN

## 2024-11-13 ASSESSMENT — LIFESTYLE VARIABLES
EVER FELT BAD OR GUILTY ABOUT YOUR DRINKING: NO
EVER HAD A DRINK FIRST THING IN THE MORNING TO STEADY YOUR NERVES TO GET RID OF A HANGOVER: NO
TOTAL SCORE: 0
HAVE PEOPLE ANNOYED YOU BY CRITICIZING YOUR DRINKING: NO
HAVE YOU EVER FELT YOU SHOULD CUT DOWN ON YOUR DRINKING: NO

## 2024-11-13 ASSESSMENT — ENCOUNTER SYMPTOMS
PERSON REPORTING PAIN: PATIENT
PAIN LOCATION: RIGHT HIP
PAIN: 1
PAIN LOCATION - PAIN SEVERITY: 2/10

## 2024-11-13 ASSESSMENT — PAIN - FUNCTIONAL ASSESSMENT
PAIN_FUNCTIONAL_ASSESSMENT: 0-10
PAIN_FUNCTIONAL_ASSESSMENT: 0-10

## 2024-11-13 ASSESSMENT — COLUMBIA-SUICIDE SEVERITY RATING SCALE - C-SSRS
6. HAVE YOU EVER DONE ANYTHING, STARTED TO DO ANYTHING, OR PREPARED TO DO ANYTHING TO END YOUR LIFE?: NO
1. IN THE PAST MONTH, HAVE YOU WISHED YOU WERE DEAD OR WISHED YOU COULD GO TO SLEEP AND NOT WAKE UP?: NO
2. HAVE YOU ACTUALLY HAD ANY THOUGHTS OF KILLING YOURSELF?: NO

## 2024-11-13 NOTE — Clinical Note
Hello - following up. Today's vitals: /67 and . No pertinent family history in first degree relatives

## 2024-11-13 NOTE — DISCHARGE INSTRUCTIONS
Please follow-up with your primary care doctor in 2 to 3 days.  Return to emergency department Fainting episodes, chest pain, shortness of breath, or if you have any questions or concerns.

## 2024-11-13 NOTE — ED PROVIDER NOTES
Emergency Department Provider Note        History of Present Illness     History provided by: Patient  Limitations to History: None  External Records Reviewed with Brief Summary: Discharge Summary from hospitalization 10/28/2024 which showed hospitalization for right femoral fracture s/p repair    HPI:  Iona Hilton is a 74 y.o. female with hypertension presenting for tachycardia.  Patient had a right hip replacement after fracture on 10/28.  She did well postop.  On a follow-up postop visit, she was noted to be tachycardic in the 130s to 160s.  They found that she had not been discharged on her Lovenox so they were concerned about a DVT or pulmonary embolism.  She received outpatient CT angio of the chest and a duplex ultrasound of her lower extremity on 11/7.  These were negative.  She was prescribed her Lovenox at that time and she has been compliant with it.  Patient continues to have tachycardia in the low 100s since then so she was recommended to go to emergency department for further evaluation.  Patient has not had any symptoms such as chest pain, shortness of breath, palpitations, leg pain or swelling, fevers or chills, cough, dysuria, dumping, nausea or vomiting.  She had the staples in her incision site removed about a week ago and feels like she has been doing well with the recovery and rehab.  She does report that she feels mildly anxious however since the surgery as she has never been hospitalized or needed any surgeries prior to this and has been historically healthy.    Physical Exam   Triage vitals:  T 36.5 °C (97.7 °F)  HR (!) 107  /77  RR 18  O2 100 % None (Room air)    Physical Exam  Vitals and nursing note reviewed.   Constitutional:       General: She is not in acute distress.     Appearance: She is well-developed.   HENT:      Head: Normocephalic and atraumatic.      Right Ear: External ear normal.      Left Ear: External ear normal.      Nose: Nose normal.      Mouth/Throat:       Mouth: Mucous membranes are moist.   Eyes:      General: No scleral icterus.     Extraocular Movements: Extraocular movements intact.      Conjunctiva/sclera: Conjunctivae normal.      Pupils: Pupils are equal, round, and reactive to light.   Cardiovascular:      Rate and Rhythm: Normal rate and regular rhythm.      Heart sounds: No murmur heard.  Pulmonary:      Effort: Pulmonary effort is normal. No respiratory distress.      Breath sounds: Normal breath sounds.   Abdominal:      Palpations: Abdomen is soft.      Tenderness: There is no abdominal tenderness.   Musculoskeletal:         General: No swelling.      Cervical back: Neck supple.   Skin:     General: Skin is warm and dry.      Comments: Surgical incision site on the right hip is well-healed without any signs of dehiscence or infection such as erythema, significant tenderness, fluctuance, discharge.   Neurological:      General: No focal deficit present.      Mental Status: She is alert.   Psychiatric:         Mood and Affect: Mood normal.          Medical Decision Making & ED Course   Medical Decision Makin y.o. female presenting for tachycardia.  On arrival, she is afebrile and hemodynamically stable.  Noted to be slightly tachycardic in the low 100s.  On chart review, she had a negative CTA of the chest and negative duplex ultrasound of the lower extremity.  Her tachycardia has improved since those tests have been performed as she has not had any development of new symptoms such as chest pain, shortness of breath, lower extremity edema to suggest a DVT or pulmonary embolism.  She denies any sick symptoms that may suggest the tachycardia secondary to an infection.  Her lungs are clear to auscultation and the surgical incision is healing well without any signs of infection or dehiscence.  No urinary symptoms.  Patient has been compliant with her postop Lovenox.  At this time, there is low clinical suspicion for development of a pulmonary embolism  that is contributing to her tachycardia.  Will eval for metabolic or cardiac causes of her tachycardia such as electrolyte imbalances or cardiac injury.    CBC negative for leukocytosis.  Anemia of 11.3 is improving since her surgery.  Troponin negative x 2.  TSH within normal limits.  Chemistry panel unremarkable.  Magnesium normal.  Chest x-ray unremarkable.  EKG shows sinus tachycardia without any acute injury pattern.    Upon observation reevaluation, patient's tachycardia does resolve spontaneously.  Findings are discussed with the patient and she does feel comfortable going home with follow-up with her primary care provider.  Home care and return instructions discussed. Patient expressed understanding and agreement. Patient discharged in stable condition.    Joel Groves DO, PGY-4  Emergency Medicine Resident  ----       Social Determinants of Health which Significantly Impact Care: None identified     EKG Independent Interpretation: EKG interpreted by myself. Please see ED Course for full interpretation.    Independent Result Review and Interpretation: Relevant laboratory and radiographic results were reviewed and independently interpreted by myself.  As necessary, they are commented on in the ED Course.    Chronic conditions affecting the patient's care: As documented above in [facility]    The patient was discussed with the following consultants/services: None    Care Considerations: As documented above in [facility]    ED Course:  Diagnoses as of 11/14/24 0319   Tachycardia     Disposition   As a result of the work-up, the patient was discharged home.  she was informed of her diagnosis and instructed to come back with any concerns or worsening of condition.  she and was agreeable to the plan as discussed above.  she was given the opportunity to ask questions.  All of the patient's questions were answered.    Procedures   Procedures    Patient seen and discussed with ED attending physician.    Joel Groves DO  Emergency  Medicine     Joel Groves DO  Resident  11/14/24 [id number]

## 2024-11-13 NOTE — ED TRIAGE NOTES
Patient sent to ED by PCP for -130 x 1 week.  R hip surgery 10/22/24. Increased anxiety since surgery. She was seen post op last Thurs and had EKG and labs done.  Her HR was elevated at that time of follow up appt.

## 2024-11-14 ENCOUNTER — HOSPITAL ENCOUNTER (OUTPATIENT)
Dept: RADIOLOGY | Facility: CLINIC | Age: 74
Discharge: HOME | End: 2024-11-14
Payer: MEDICARE

## 2024-11-14 ENCOUNTER — HOME CARE VISIT (OUTPATIENT)
Dept: HOME HEALTH SERVICES | Facility: HOME HEALTH | Age: 74
End: 2024-11-14
Payer: MEDICARE

## 2024-11-14 ENCOUNTER — OFFICE VISIT (OUTPATIENT)
Dept: ORTHOPEDIC SURGERY | Facility: CLINIC | Age: 74
End: 2024-11-14
Payer: MEDICARE

## 2024-11-14 VITALS
HEART RATE: 120 BPM | RESPIRATION RATE: 20 BRPM | OXYGEN SATURATION: 99 % | DIASTOLIC BLOOD PRESSURE: 64 MMHG | SYSTOLIC BLOOD PRESSURE: 102 MMHG | TEMPERATURE: 98.7 F

## 2024-11-14 VITALS — SYSTOLIC BLOOD PRESSURE: 110 MMHG | OXYGEN SATURATION: 100 % | DIASTOLIC BLOOD PRESSURE: 60 MMHG | HEART RATE: 83 BPM

## 2024-11-14 DIAGNOSIS — Z96.649 S/P HIP HEMIARTHROPLASTY: ICD-10-CM

## 2024-11-14 DIAGNOSIS — S72.001A CLOSED FRACTURE OF NECK OF RIGHT FEMUR, INITIAL ENCOUNTER: Primary | ICD-10-CM

## 2024-11-14 LAB
ATRIAL RATE: 101 BPM
P AXIS: 79 DEGREES
P OFFSET: 197 MS
P ONSET: 145 MS
PR INTERVAL: 144 MS
Q ONSET: 217 MS
QRS COUNT: 17 BEATS
QRS DURATION: 82 MS
QT INTERVAL: 340 MS
QTC CALCULATION(BAZETT): 440 MS
QTC FREDERICIA: 404 MS
R AXIS: 73 DEGREES
T AXIS: 66 DEGREES
T OFFSET: 387 MS
VENTRICULAR RATE: 101 BPM

## 2024-11-14 PROCEDURE — 1159F MED LIST DOCD IN RCRD: CPT | Performed by: PHYSICIAN ASSISTANT

## 2024-11-14 PROCEDURE — G0299 HHS/HOSPICE OF RN EA 15 MIN: HCPCS | Mod: HHH

## 2024-11-14 PROCEDURE — 1036F TOBACCO NON-USER: CPT | Performed by: PHYSICIAN ASSISTANT

## 2024-11-14 PROCEDURE — G0152 HHCP-SERV OF OT,EA 15 MIN: HCPCS | Mod: HHH

## 2024-11-14 PROCEDURE — 99024 POSTOP FOLLOW-UP VISIT: CPT | Performed by: PHYSICIAN ASSISTANT

## 2024-11-14 PROCEDURE — 73502 X-RAY EXAM HIP UNI 2-3 VIEWS: CPT | Mod: RT

## 2024-11-14 PROCEDURE — 1123F ACP DISCUSS/DSCN MKR DOCD: CPT | Performed by: PHYSICIAN ASSISTANT

## 2024-11-14 PROCEDURE — 99211 OFF/OP EST MAY X REQ PHY/QHP: CPT | Performed by: ORTHOPAEDIC SURGERY

## 2024-11-14 PROCEDURE — 1111F DSCHRG MED/CURRENT MED MERGE: CPT | Performed by: PHYSICIAN ASSISTANT

## 2024-11-14 ASSESSMENT — ENCOUNTER SYMPTOMS
APPETITE LEVEL: GOOD
PAIN LOCATION - PAIN FREQUENCY: FREQUENT
HIGHEST PAIN SEVERITY IN PAST 24 HOURS: 4/10
SUBJECTIVE PAIN PROGRESSION: GRADUALLY IMPROVING
PAIN LOCATION - PAIN SEVERITY: 1/10
MUSCLE WEAKNESS: 1
PAIN LOCATION - RELIEVING FACTORS: MEDS/ICE/REST
PAIN LOCATION - EXACERBATING FACTORS: ACTIVITY
LIMITED RANGE OF MOTION: 1
PERSON REPORTING PAIN: PATIENT
PAIN: 1
LOWEST PAIN SEVERITY IN PAST 24 HOURS: 3/10
PAIN SEVERITY GOAL: 0/10
LOWEST PAIN SEVERITY IN PAST 24 HOURS: 1/10
PAIN LOCATION: RIGHT HIP
SUBJECTIVE PAIN PROGRESSION: GRADUALLY IMPROVING
HIGHEST PAIN SEVERITY IN PAST 24 HOURS: 6/10
PERSON REPORTING PAIN: PATIENT
PAIN: 1
PAIN LOCATION: RIGHT LEG
CHANGE IN APPETITE: UNCHANGED

## 2024-11-14 ASSESSMENT — ACTIVITIES OF DAILY LIVING (ADL)
CURRENT_FUNCTION: ONE PERSON
DRESSING_UB_CURRENT_FUNCTION: INDEPENDENT
BATHING ASSESSED: 1
TOILETING: INDEPENDENT
PREPARING MEALS: INDEPENDENT
DRESSING_LB_CURRENT_FUNCTION: INDEPENDENT
BATHING_CURRENT_FUNCTION: SUPERVISION
AMBULATION ASSISTANCE: ONE PERSON
TOILETING: 1

## 2024-11-14 NOTE — PROGRESS NOTES
11/14/2024    Chief Complaint   Patient presents with    Right Hip - Post-op     HUGO  DOS: 10/23/24  Xrays today        History of Present Illness:  Patient Iona Hilton , 74 y.o. female, presents today, 11/14/2024, for evaluation of right  hip   hemiarthroplasty, 3 weeks post-op .  Patient states she is done well in the interim since surgery.  Still has some soreness and discomfort to the hip and leg but overall this is improving.  She was originally discharged to rehab facility and now has been sent home and doing home therapy.  She feels she is making progress with this.  She denies any new injury or trauma.  She is taking the Lovenox as prescribed for DVT prophylaxis.  She comes in today using a walker for ambulation but states she was already doing some cane training yesterday.       Review of Systems:   GENERAL: Negative  GI: Negative  MUSCULOSKELETAL: See HPI  SKIN: Negative  NEURO:  Negative     Physical Exam:  GENERAL:  Alert and oriented to person, place, and time.  No acute distress and breathing comfortably; pleasant and cooperative with the examination.  HEENT:  Head is normocephalic and atraumatic.  NECK:  Supple, no visible swelling.  CARDIOVASCULAR:  No palpable tachycardia.  LUNGS:  No audible wheezing or labored breathing.  ABDOMEN:  Nondistended.  Extremities: Evaluation of the right lower extremity finds the patient to have a palpable dorsalis pedis pulse to palpation with brisk capillary refill through the toes. The patient has intact sensorium to tibial, sural, saphenous, deep and superficial peroneal nerves to light touch. EHL, FHL, dorsiflexion and plantarflexion are intact to motor. No lymphedema or lymphatic streaking. No signs of deep vein thrombosis. No open wounds. No signs of infection. Supple compartments to the thigh, leg and foot.  Surgical incision is well-healed.  Staples have been removed.  She has intact flexion extension across the knee.  Calf is soft nontender and  supple on exam.      Imaging/Test Results:  2 views of the hip show evidence of right hip hemiarthroplasty with good alignment in both planes.  No evidence of subsidence of implant.     Assessment:  Right hip hemiarthroplasty, 3 weeks postop.     Plan:  Patient can continue weight-bear as tolerated the right lower extremity.  Continue work with home therapy for endurance strengthening and motion recovery.  Follow-up with our office in 6 weeks for repeat clinical and radiographic exam, x-rays 3 views of the hip upon return.  Continue with Lovenox for the next 13 days until completed.  All questions answered at today's visit.    Shauna Martinez PA-C

## 2024-11-15 NOTE — HOME HEALTH
Patient in ER on prior day due to increased heart rate with no acute findings and patient dischared home.

## 2024-11-20 ENCOUNTER — HOME CARE VISIT (OUTPATIENT)
Dept: HOME HEALTH SERVICES | Facility: HOME HEALTH | Age: 74
End: 2024-11-20
Payer: MEDICARE

## 2024-11-20 VITALS
TEMPERATURE: 97.2 F | SYSTOLIC BLOOD PRESSURE: 118 MMHG | DIASTOLIC BLOOD PRESSURE: 66 MMHG | HEART RATE: 115 BPM | OXYGEN SATURATION: 97 %

## 2024-11-20 VITALS
TEMPERATURE: 97.8 F | OXYGEN SATURATION: 100 % | RESPIRATION RATE: 20 BRPM | HEART RATE: 117 BPM | DIASTOLIC BLOOD PRESSURE: 72 MMHG | SYSTOLIC BLOOD PRESSURE: 126 MMHG

## 2024-11-20 PROCEDURE — G0151 HHCP-SERV OF PT,EA 15 MIN: HCPCS | Mod: HHH

## 2024-11-20 PROCEDURE — G0299 HHS/HOSPICE OF RN EA 15 MIN: HCPCS | Mod: HHH

## 2024-11-20 ASSESSMENT — ENCOUNTER SYMPTOMS
PAIN: 1
PAIN LOCATION: RIGHT HIP
PERSON REPORTING PAIN: PATIENT
APPETITE LEVEL: GOOD
LIMITED RANGE OF MOTION: 1
PAIN LOCATION - PAIN SEVERITY: 1/10
MUSCLE WEAKNESS: 1

## 2024-11-21 ENCOUNTER — HOME CARE VISIT (OUTPATIENT)
Dept: HOME HEALTH SERVICES | Facility: HOME HEALTH | Age: 74
End: 2024-11-21
Payer: MEDICARE

## 2024-11-21 VITALS
SYSTOLIC BLOOD PRESSURE: 100 MMHG | DIASTOLIC BLOOD PRESSURE: 70 MMHG | OXYGEN SATURATION: 100 % | HEART RATE: 95 BPM | TEMPERATURE: 97.7 F

## 2024-11-21 PROCEDURE — G0151 HHCP-SERV OF PT,EA 15 MIN: HCPCS | Mod: HHH

## 2024-11-21 ASSESSMENT — ENCOUNTER SYMPTOMS
MUSCLE WEAKNESS: 1
PERSON REPORTING PAIN: PATIENT
LIMITED RANGE OF MOTION: 1
PAIN LOCATION - PAIN SEVERITY: 3/10
PAIN LOCATION: RIGHT HIP
PAIN: 1

## 2024-11-21 ASSESSMENT — ACTIVITIES OF DAILY LIVING (ADL)
HOME_HEALTH_OASIS: 01
AMBULATION ASSISTANCE ON UNEVEN SURFACES: 1
AMBULATION ASSISTANCE ON FLAT SURFACES: 1
OASIS_M1830: 02

## 2024-11-26 ENCOUNTER — APPOINTMENT (OUTPATIENT)
Dept: PHYSICAL THERAPY | Facility: CLINIC | Age: 74
End: 2024-11-26
Payer: MEDICARE

## 2024-12-03 ENCOUNTER — EVALUATION (OUTPATIENT)
Dept: PHYSICAL THERAPY | Facility: CLINIC | Age: 74
End: 2024-12-03
Payer: MEDICARE

## 2024-12-03 DIAGNOSIS — M25.551 RIGHT HIP PAIN: Primary | ICD-10-CM

## 2024-12-03 DIAGNOSIS — M62.81 MUSCLE WEAKNESS OF LOWER EXTREMITY: ICD-10-CM

## 2024-12-03 DIAGNOSIS — R26.9 ABNORMALITY OF GAIT: ICD-10-CM

## 2024-12-03 PROCEDURE — 97110 THERAPEUTIC EXERCISES: CPT | Mod: GP | Performed by: PHYSICAL THERAPIST

## 2024-12-03 PROCEDURE — 97161 PT EVAL LOW COMPLEX 20 MIN: CPT | Mod: GP | Performed by: PHYSICAL THERAPIST

## 2024-12-03 ASSESSMENT — PAIN SCALES - GENERAL: PAINLEVEL_OUTOF10: 2

## 2024-12-03 ASSESSMENT — PAIN - FUNCTIONAL ASSESSMENT: PAIN_FUNCTIONAL_ASSESSMENT: 0-10

## 2024-12-04 PROBLEM — M62.81 MUSCLE WEAKNESS OF LOWER EXTREMITY: Status: ACTIVE | Noted: 2024-12-04

## 2024-12-04 PROBLEM — R26.9 ABNORMALITY OF GAIT: Status: ACTIVE | Noted: 2024-12-04

## 2024-12-04 PROBLEM — M25.551 RIGHT HIP PAIN: Status: ACTIVE | Noted: 2024-12-04

## 2024-12-04 ASSESSMENT — ENCOUNTER SYMPTOMS
DEPRESSION: 0
LOSS OF SENSATION IN FEET: 0
OCCASIONAL FEELINGS OF UNSTEADINESS: 0

## 2024-12-04 NOTE — PROGRESS NOTES
Physical Therapy Evaluation and Treatment      Patient Name: Iona Hilton  MRN: [id number]  Today's Date: 12/3/2024  Visit #1  Time Calculation  Start Time: 1750  Stop Time: 1830  Time Calculation (min): 40 min    Insurance:  Visit Limit: Med nec  Date Range: 2/26/2025  Co-Pay: None    Assessment:  Patient is presenting today s/p R hip hemiarthroplasty on 10/23/2024. Examination reveals below impairments. No signs/symptoms of DVT, infection, or rejection of prosthesis. Patient will benefit from physical therapy services to improve listed impairments. Initiated treatment today to address these impairments.    Patient with the following impairments: decreased muscle performance, decreased ROM, decreased activity tolerance, pain, participation restrictions, impaired balance/gait, and difficulty with ADL completion    Patient's response to session: No change in pain, Increased ROM/joint mobility, Increase motor control, and Increased knowledge and understanding    Plan:  Treatment/Interventions: Cryotherapy, Education/ Instruction, Gait training, Hot pack, Manual therapy, Neuromuscular re-education, Self care/ home management, Taping techniques, Therapeutic activities, Therapeutic exercises  PT Plan: Skilled PT  PT Frequency: 2 times per week  Duration: 12 weeks  Onset Date: 10/23/24  Certification Period Start Date: 12/03/24  Certification Period End Date: 03/03/25  Rehab Potential: Good  Plan of Care Agreement: Patient    Current Problem:   1. Right hip pain  Follow Up In Physical Therapy      2. Muscle weakness of lower extremity  Follow Up In Physical Therapy      3. Abnormality of gait  Follow Up In Physical Therapy          Subjective   Patient presenting today s/p R hip hemiarthroplasty on 10/23/2024. Patient is doing well overall. Pain is well controlled with OTC medication. She went to an inpatient rehab facility and then completed home PT. She has been working on HEP. Patient denies numbness/tingling.  Patient wishes to regain full function.    Pain Assessment: 0-10  0-10 (Numeric) Pain Score: 2    General  Referred By: Self    Precautions  STEADI Fall Risk Score (The score of 4 or more indicates an increased risk of falling): 4  Precautions Comment: Lateral hip precautions    Objective   Hip ROM (L/R)  Flexion: 125°/85°  Abduction: 45°/25°  Extension: 10°/NT  External Rotation: 45°/NT  Internal rotation: 45°/NT    Specific Lower Extremity MMT (L/R) - deferred    Dermatomes intact BLE    Hamstring Length: Positive B    Gait: antalgic with FWW    Outcome Measures:  Other Measures  Lower Extremity Funtional Score (LEFS): 34     Treatments:  Therapeutic Exercise (84447): 18 minutes  HEP review  PROM hip flexion  Bridges  Standing hip ABD*  Ambulation in clinic    Education and discussion on HEP and treatment regarding the benefits related to current condition, POC, pathophysiology, and precautions    *added to HEP    Goals:  Patient will improve Lower Extremity Functional Scale score to meet minimal detectable change of improvement to improve performance of ADLs.    Patient will be independent with home exercise program for proper self-management of condition.    Patient will improve active range of motion in deficit areas for ADL completion.    Patient will improve strength in deficit areas so patient can work with less pain.    Patient will walk without pain or assistive device.

## 2024-12-05 ENCOUNTER — PATIENT OUTREACH (OUTPATIENT)
Dept: PRIMARY CARE | Facility: CLINIC | Age: 74
End: 2024-12-05
Payer: MEDICARE

## 2024-12-05 DIAGNOSIS — N95.1 MENOPAUSAL SYMPTOMS: ICD-10-CM

## 2024-12-05 DIAGNOSIS — S72.001D CLOSED FRACTURE OF NECK OF RIGHT FEMUR WITH ROUTINE HEALING, SUBSEQUENT ENCOUNTER: ICD-10-CM

## 2024-12-05 DIAGNOSIS — M85.9 DISORDER OF BONE DENSITY AND STRUCTURE, UNSPECIFIED: ICD-10-CM

## 2024-12-05 DIAGNOSIS — M80.00XD AGE-RELATED OSTEOPOROSIS WITH CURRENT PATHOLOGICAL FRACTURE WITH ROUTINE HEALING: ICD-10-CM

## 2024-12-05 DIAGNOSIS — M81.0 AGE-RELATED OSTEOPOROSIS WITHOUT CURRENT PATHOLOGICAL FRACTURE: ICD-10-CM

## 2024-12-05 NOTE — PROGRESS NOTES
Quality Measure: Osteoporosis Management in Women     Date of Fracture: 10/22/24  Date of last DEXA bone density scan: 3/14/22  Date of next appointment with PCP: needed    Chart Review completed.  Spoke with Iona.  She is due for repeat BMD.  Order formatted  She is agreeable to schedule this.    My contact info provided to Iona if any questions arise.    NATALIYA SilveiraN-RN  Nurse Care Manager  [phone]

## 2024-12-06 ENCOUNTER — APPOINTMENT (OUTPATIENT)
Dept: PHYSICAL THERAPY | Facility: CLINIC | Age: 74
End: 2024-12-06
Payer: MEDICARE

## 2024-12-06 DIAGNOSIS — M62.81 MUSCLE WEAKNESS OF LOWER EXTREMITY: ICD-10-CM

## 2024-12-06 DIAGNOSIS — M25.551 RIGHT HIP PAIN: Primary | ICD-10-CM

## 2024-12-06 DIAGNOSIS — R26.9 ABNORMALITY OF GAIT: ICD-10-CM

## 2024-12-09 ENCOUNTER — TREATMENT (OUTPATIENT)
Dept: PHYSICAL THERAPY | Facility: CLINIC | Age: 74
End: 2024-12-09
Payer: MEDICARE

## 2024-12-09 DIAGNOSIS — M25.551 RIGHT HIP PAIN: Primary | ICD-10-CM

## 2024-12-09 DIAGNOSIS — R26.9 ABNORMALITY OF GAIT: ICD-10-CM

## 2024-12-09 DIAGNOSIS — M62.81 MUSCLE WEAKNESS OF LOWER EXTREMITY: ICD-10-CM

## 2024-12-09 PROCEDURE — 97110 THERAPEUTIC EXERCISES: CPT | Mod: GP | Performed by: PHYSICAL THERAPIST

## 2024-12-09 ASSESSMENT — PAIN SCALES - GENERAL: PAINLEVEL_OUTOF10: 2

## 2024-12-09 ASSESSMENT — PAIN - FUNCTIONAL ASSESSMENT: PAIN_FUNCTIONAL_ASSESSMENT: 0-10

## 2024-12-09 NOTE — PROGRESS NOTES
Physical Therapy Treatment      Patient Name: Iona Hilton  MRN: [id number]  Today's Date: 12/9/2024  Visit #2  Time Calculation  Start Time: 1445  Stop Time: 1515  Time Calculation (min): 30 min    Insurance:  Visit Limit: Med nec  Date Range: 2/26/2025  Co-Pay: None    Assessment:  Today's session focused on strength, ROM, neuromuscular control, endurance, joint mobility, soft tissue mobilization, flexibility, gait, and balance. Patient demonstrated good tolerance to session this date. They are demonstrating good progress in skilled rehabilitation at this time, though they are still limited by decreased muscle performance, decreased ROM, decreased activity tolerance, pain, participation restrictions, impaired balance/gait, and difficulty with ADL completion. Patient continues to be a good candidate for skilled PT in order to further address listed impairments. Updated HEP to reflect today's session. All questions answered.    Patient's response to session: No change in pain, Increased ROM/joint mobility, Increase motor control, and Increased knowledge and understanding    Plan:  Continue per POC.    Current Problem:   1. Right hip pain  Follow Up In Physical Therapy      2. Muscle weakness of lower extremity  Follow Up In Physical Therapy      3. Abnormality of gait  Follow Up In Physical Therapy          Subjective   Patient reports she is feeling better overall. Has been working on HEP. Sleep is still a problem, but improving. She is 6.5 weeks s/p R hip hemiarthroplasty on 10/23/2024.    Pain Assessment: 0-10  0-10 (Numeric) Pain Score: 2    Precautions  Precautions Comment: Lateral hip precautions    Objective   Hip ROM (L/R)  Flexion: 125°/90°  Abduction: 45°/32°  Extension: 10°/NT  External Rotation: 45°/NT  Internal rotation: 45°/NT     Dermatomes intact BLE     Hamstring Length: Positive B     Gait: antalgic with FWW and cane    Treatments:  Therapeutic Exercise (35877): 28 minutes  HEP review  Ambulation  "with cane for distance  Bridges  PROM hip flexion  Hip ABD stretching  STS*  Step ups, 6\"    Manual Therapy (38721):  0 minutes  Not performed    Neuromuscular Re-education (54298):  0 minutes  Not performed    Education and discussion on HEP and treatment regarding the benefits related to current condition, POC, pathophysiology, and precautions    *added to HEP    "

## 2024-12-10 NOTE — PROGRESS NOTES
Multiple attempts to contact patient to schedule. Due to patient not answering and failure to return call, closing this encounter out. Will address when patient calls to schedule.

## 2024-12-17 ENCOUNTER — TREATMENT (OUTPATIENT)
Dept: PHYSICAL THERAPY | Facility: CLINIC | Age: 74
End: 2024-12-17
Payer: MEDICARE

## 2024-12-17 DIAGNOSIS — R26.9 ABNORMALITY OF GAIT: ICD-10-CM

## 2024-12-17 DIAGNOSIS — M62.81 MUSCLE WEAKNESS OF LOWER EXTREMITY: ICD-10-CM

## 2024-12-17 DIAGNOSIS — M25.551 RIGHT HIP PAIN: Primary | ICD-10-CM

## 2024-12-17 PROCEDURE — 97110 THERAPEUTIC EXERCISES: CPT | Mod: GP,CQ

## 2024-12-17 NOTE — PROGRESS NOTES
"Physical Therapy Treatment      Patient Name: Iona Hilton  MRN: [id number]  Today's Date: 12/17/2024  Visit #3  Time Calculation  Start Time: 1335  Stop Time: 1405  Time Calculation (min): 30 min    Insurance:  Visit Limit: Med nec  Date Range: 2/26/2025  Co-Pay: None    Assessment:  Pt. Tolerated session well today and is making progress towards goals.  She has been compliant with home program and reporting increased functional use and a decrease in pain.  Pt. Did well ambulating with a straight cane with less antalgic gait noted.  Patient continues to be a good candidate for skilled PT in order to further address listed impairments. Updated HEP to reflect today's session. All questions answered.    Patient's response to session: Decreased pain, Increased ROM/joint mobility, Increase motor control, and Increased knowledge and understanding    Plan:  Continue per POC.    Current Problem:   1. Right hip pain  Follow Up In Physical Therapy      2. Muscle weakness of lower extremity  Follow Up In Physical Therapy      3. Abnormality of gait  Follow Up In Physical Therapy          Subjective   Patient reports exercises are improving and having less discomfort in right groin.  She arrived today using her SPC with good technique.  She is 7.6 weeks s/p R hip hemiarthroplasty on 10/23/2024.              Objective     Treatments:  Therapeutic Exercise (38104): 30 minutes  HEP review  Ambulation with cane for distance x 200 with improved gait pattern and less antalgic  Bridges x 20 reps  PROM hip flexion per therapist to 90 degrees.  Hip ABD stretching per therapist  STS from elevated mat table x 10 reps  Step ups, 6\" x 10 reps.  Forward and lateral stepping over 3 hurdles multiple reps of each with UE support on // bars.  Some soreness with anterior stepping over hurdles.    Manual Therapy (43010):  0 minutes  Not performed    Neuromuscular Re-education (05953):  0 minutes  Not performed    Education and discussion on HEP " and treatment regarding the benefits related to current condition, POC, pathophysiology, and precautions    *added to HEP

## 2024-12-20 ENCOUNTER — APPOINTMENT (OUTPATIENT)
Dept: PHYSICAL THERAPY | Facility: CLINIC | Age: 74
End: 2024-12-20
Payer: MEDICARE

## 2024-12-20 DIAGNOSIS — M25.551 RIGHT HIP PAIN: Primary | ICD-10-CM

## 2024-12-20 DIAGNOSIS — M62.81 MUSCLE WEAKNESS OF LOWER EXTREMITY: ICD-10-CM

## 2024-12-20 DIAGNOSIS — R26.9 ABNORMALITY OF GAIT: ICD-10-CM

## 2024-12-30 ENCOUNTER — TREATMENT (OUTPATIENT)
Dept: PHYSICAL THERAPY | Facility: CLINIC | Age: 74
End: 2024-12-30
Payer: MEDICARE

## 2024-12-30 DIAGNOSIS — M25.551 RIGHT HIP PAIN: Primary | ICD-10-CM

## 2024-12-30 DIAGNOSIS — R26.9 ABNORMALITY OF GAIT: ICD-10-CM

## 2024-12-30 DIAGNOSIS — M62.81 MUSCLE WEAKNESS OF LOWER EXTREMITY: ICD-10-CM

## 2024-12-30 PROCEDURE — 97110 THERAPEUTIC EXERCISES: CPT | Mod: GP,CQ

## 2024-12-30 NOTE — PROGRESS NOTES
Physical Therapy Treatment      Patient Name: Iona Hilton  MRN: [id number]  Today's Date: 12/30/2024  Visit #4  Time Calculation  Start Time: 1600  Stop Time: 1645  Time Calculation (min): 45 min    Insurance:  Visit Limit: Med nec  Date Range: 2/26/2025  Co-Pay: None    Assessment:  Pt. Tolerated session well today and is making progress towards goals.  Pt. Is reporting increased functional levels at home.  Pt. Has been compliant with home program. Patient continues to be a good candidate for skilled PT in order to further address  impairments of decrease range of motion and strength along with balance and gait.  Updated HEP to reflect today's session. All questions answered.    Patient's response to session: Decreased pain, Increased ROM/joint mobility, Increase motor control, and Increased knowledge and understanding    Plan:  Continue per POC.    Current Problem:   1. Right hip pain  Follow Up In Physical Therapy      2. Muscle weakness of lower extremity  Follow Up In Physical Therapy      3. Abnormality of gait  Follow Up In Physical Therapy          Subjective   Patient reports current pain level 2/10 in right buttock.  Pt. Overall reports sleeping better and longer increments.   She is 9.5 weeks s/p R hip hemiarthroplasty on 10/23/2024.              Objective     Treatments:  Therapeutic Exercise (59342): 45 minutes  HEP review  Ambulation with cane for distance x 240 ft with improved gait pattern and less antalgic.  Did lower cane one notch.  Practiced the 5 therapy steps using railings and reciprocating steps multiple trials.  Bridges x 25 reps  Hooklying marching x 20 reps issued for HEP.  PROM hip flexion per therapist to 90 degrees.  Hip ABD stretching per therapist  STS from plinth x 10 reps, attempted with right leg in a more posterior position but too painful   lateral stepping over 4 sticks without UE support multiple trials.  High marching and retro walking in // bars multiple reps.    Manual  Therapy (84088):  0 minutes  Not performed    Neuromuscular Re-education (67568):  0 minutes  Not performed    Education and discussion on HEP and treatment regarding the benefits related to current condition, POC, pathophysiology, and precautions    *added to HEP

## 2025-01-02 ENCOUNTER — HOSPITAL ENCOUNTER (OUTPATIENT)
Dept: RADIOLOGY | Facility: CLINIC | Age: 75
Discharge: HOME | End: 2025-01-02
Payer: MEDICARE

## 2025-01-02 ENCOUNTER — TREATMENT (OUTPATIENT)
Dept: PHYSICAL THERAPY | Facility: CLINIC | Age: 75
End: 2025-01-02
Payer: MEDICARE

## 2025-01-02 ENCOUNTER — OFFICE VISIT (OUTPATIENT)
Dept: ORTHOPEDIC SURGERY | Facility: CLINIC | Age: 75
End: 2025-01-02
Payer: MEDICARE

## 2025-01-02 DIAGNOSIS — Z96.649 S/P HIP HEMIARTHROPLASTY: ICD-10-CM

## 2025-01-02 DIAGNOSIS — M25.551 RIGHT HIP PAIN: Primary | ICD-10-CM

## 2025-01-02 DIAGNOSIS — R26.9 ABNORMALITY OF GAIT: ICD-10-CM

## 2025-01-02 DIAGNOSIS — M62.81 MUSCLE WEAKNESS OF LOWER EXTREMITY: ICD-10-CM

## 2025-01-02 PROCEDURE — 73502 X-RAY EXAM HIP UNI 2-3 VIEWS: CPT | Mod: RT

## 2025-01-02 PROCEDURE — 99211 OFF/OP EST MAY X REQ PHY/QHP: CPT | Performed by: ORTHOPAEDIC SURGERY

## 2025-01-02 PROCEDURE — 73502 X-RAY EXAM HIP UNI 2-3 VIEWS: CPT | Mod: RIGHT SIDE | Performed by: ORTHOPAEDIC SURGERY

## 2025-01-02 PROCEDURE — 97110 THERAPEUTIC EXERCISES: CPT | Mod: GP | Performed by: PHYSICAL THERAPIST

## 2025-01-02 ASSESSMENT — PAIN - FUNCTIONAL ASSESSMENT: PAIN_FUNCTIONAL_ASSESSMENT: 0-10

## 2025-01-02 ASSESSMENT — PAIN SCALES - GENERAL: PAINLEVEL_OUTOF10: 1

## 2025-01-02 NOTE — PROGRESS NOTES
Physical Therapy Treatment      Patient Name: Iona Hilton  MRN: [id number]  Today's Date: 1/2/2025  Visit #5  Time Calculation  Start Time: 1657  Stop Time: 1739  Time Calculation (min): 42 min    Insurance:  Visit Limit: Med nec  Date Range: 2/26/2025  Co-Pay: None    Assessment:  Patient showing improvements in muscle performance and ROM. Today's session focused on strength, ROM, neuromuscular control, endurance, joint mobility, soft tissue mobilization, flexibility, gait, and balance. Patient demonstrated good tolerance to session this date. They are demonstrating good progress in skilled rehabilitation at this time, though they are still limited by decreased muscle performance, decreased ROM, decreased activity tolerance, pain, participation restrictions, impaired balance/gait, and difficulty with ADL completion. Patient continues to be a good candidate for skilled PT in order to further address listed impairments. Updated HEP to reflect today's session. All questions answered.    Patient's response to session: No change in pain, Increased ROM/joint mobility, Increase motor control, and Increased knowledge and understanding    Plan:  Continue per POC.    Current Problem:   1. Right hip pain        2. Muscle weakness of lower extremity        3. Abnormality of gait            Subjective   Patient reports she continues to improve. She has been on her feet more and using the cane less. She still feels that she is not where she wants to be. Wishes to be more independent with ADLs. She is 10.1 weeks s/p R hip hemiarthroplasty on 10/23/2024.    Pain Assessment: 0-10  0-10 (Numeric) Pain Score: 1    Precautions  Precautions Comment: None    Objective   Hip ROM (L/R)  Flexion: 125°/97°  Abduction: 45°/40°  Extension: 10°/1°  External Rotation: 45°/35°  Internal rotation: 45°/30°      Hamstring Length: WNL     Gait: reduced stance time and hip extension with and without cane    Treatments:  Therapeutic Exercise  (52150): 41 minutes  Extended HEP review  Upright bike x 4 min  Ambulation in clinic  Marching bridges*  PROM hip flexion, ER, IR  STS*  SLR*  Side steps, yellow TB*    Manual Therapy (43594):  0 minutes  Not performed    Neuromuscular Re-education (55130):  0 minutes  Not performed    Education and discussion on HEP and treatment regarding the benefits related to current condition, POC, pathophysiology, and precautions    *added to HEP

## 2025-01-02 NOTE — LETTER
To whom it may concern,    Iona Hilton has been under my care after fracturing her right hip that required surgical repair.  She is not able to bowl for the rest of this season adilson to this injury.        If there are any concerns or questions, please do not hesitate to contact my office at [phone].    Sincerely,      Kt Nino, DO

## 2025-01-02 NOTE — PROGRESS NOTES
1/2/2025    Chief Complaint   Patient presents with    Right Hip - Follow-up     HUGO  DOS: 10/23/24  Xrays today        History of Present Illness:  Patient Iona Hilton , 74 y.o. female, presents today, 1/2/2025, for evaluation of right  hip   hemiarthroplasty, 10 weeks out and doing good.  She denies pain or discomfort.  She feels she has some generalized weakness she is using a cane for ambulation but this is slowly improving.  She is still working with therapy and endurance and strengthening .         Review of Systems:   GENERAL: Negative  GI: Negative  MUSCULOSKELETAL: See HPI  SKIN: Negative  NEURO:  Negative     Physical Exam:  GENERAL:  Alert and oriented to person, place, and time.  No acute distress and breathing comfortably; pleasant and cooperative with the examination.  HEENT:  Head is normocephalic and atraumatic.  NECK:  Supple, no visible swelling.  CARDIOVASCULAR:  No palpable tachycardia.  LUNGS:  No audible wheezing or labored breathing.  ABDOMEN:  Nondistended.  Extremities: Evaluation of the right lower extremity finds the patient to have a palpable dorsalis pedis pulse to palpation with brisk capillary refill through the toes. The patient has intact sensorium to tibial, sural, saphenous, deep and superficial peroneal nerves to light touch. EHL, FHL, dorsiflexion and plantarflexion are intact to motor. No lymphedema or lymphatic streaking. No signs of deep vein thrombosis. No open wounds. No signs of infection. Supple compartments to the thigh, leg and foot.     Imaging/Test Results:  2 views of the hip show evidence of right hip hemiarthroplasty with good alignment.  No evidence of subsidence or migration of implant.     Assessment:  Right hip hemiarthroplasty, about 10 weeks postop.     Plan:  Patient can continue with weightbearing activities to tolerance in the right lower extremity.  We recommend continued formal therapy to work on motion recovery with endurance and strengthening  gait and balance training.  New requisition provided.  She will follow-up with office in as-needed basis.  All questions answered at today's visit.    Shauna Martinez PA-C

## 2025-01-07 ENCOUNTER — TREATMENT (OUTPATIENT)
Dept: PHYSICAL THERAPY | Facility: CLINIC | Age: 75
End: 2025-01-07
Payer: MEDICARE

## 2025-01-07 DIAGNOSIS — M25.551 RIGHT HIP PAIN: Primary | ICD-10-CM

## 2025-01-07 DIAGNOSIS — M62.81 MUSCLE WEAKNESS OF LOWER EXTREMITY: ICD-10-CM

## 2025-01-07 DIAGNOSIS — R26.9 ABNORMALITY OF GAIT: ICD-10-CM

## 2025-01-07 PROCEDURE — 97110 THERAPEUTIC EXERCISES: CPT | Mod: GP | Performed by: PHYSICAL THERAPIST

## 2025-01-07 PROCEDURE — 97112 NEUROMUSCULAR REEDUCATION: CPT | Mod: GP | Performed by: PHYSICAL THERAPIST

## 2025-01-07 ASSESSMENT — PAIN - FUNCTIONAL ASSESSMENT: PAIN_FUNCTIONAL_ASSESSMENT: 0-10

## 2025-01-07 ASSESSMENT — PAIN SCALES - GENERAL: PAINLEVEL_OUTOF10: 1

## 2025-01-07 NOTE — PROGRESS NOTES
Physical Therapy Treatment      Patient Name: Iona Hilton  MRN: [id number]  Today's Date: 1/7/2025  Visit #6  Time Calculation  Start Time: 1500  Stop Time: 1545  Time Calculation (min): 45 min    Insurance:  Visit Limit: Med nec  Date Range: 2/26/2025  Co-Pay: None    Assessment:  Patient continuing to show improvements in gait, activity tolerance, and ROM. Today's session focused on strength, ROM, neuromuscular control, endurance, joint mobility, soft tissue mobilization, flexibility, gait, and balance. Patient demonstrated good tolerance to session this date. They are demonstrating good progress in skilled rehabilitation at this time, though they are still limited by decreased muscle performance, decreased ROM, decreased activity tolerance, pain, participation restrictions, impaired balance/gait, and difficulty with ADL completion. Patient continues to be a good candidate for skilled PT in order to further address listed impairments. Updated HEP to reflect today's session. All questions answered.    Patient's response to session: No change in pain, Increased ROM/joint mobility, Increase motor control, and Increased knowledge and understanding    Plan:  Continue per POC.    Current Problem:   1. Right hip pain  Follow Up In Physical Therapy      2. Muscle weakness of lower extremity  Follow Up In Physical Therapy      3. Abnormality of gait  Follow Up In Physical Therapy          Subjective   Patient reports she has made large improvements over the last week. She is showering without DME and going up stairs reciprocally. She is 10.6 weeks s/p R hip hemiarthroplasty on 10/23/2024.    Pain Assessment: 0-10  0-10 (Numeric) Pain Score: 1    Precautions  Precautions Comment: None    Objective   Hip ROM (L/R)  Flexion: 125°/100°  Abduction: 45°/40°  Extension: 10°/1°  External Rotation: 45°/35°  Internal rotation: 45°/30°      Hamstring Length: WNL     Gait: reduced stance time and hip extension with and without  cane    Treatments:  Therapeutic Exercise (04191): 28 minutes  Extended HEP review  Upright bike x 5 min  Ambulation in clinic  PROM hip flexion, ER, IR  STS*  Side steps, yellow TB*  Lunge on stairs*    Manual Therapy (02107): 2 minutes  Long axis distraction    Neuromuscular Re-education (36579): 10 minutes  Ambulation with proper mechanics without device in // bars    Education and discussion on HEP and treatment regarding the benefits related to current condition, POC, pathophysiology, and precautions    *added to HEP

## 2025-01-10 ENCOUNTER — TREATMENT (OUTPATIENT)
Dept: PHYSICAL THERAPY | Facility: CLINIC | Age: 75
End: 2025-01-10
Payer: MEDICARE

## 2025-01-10 DIAGNOSIS — M62.81 MUSCLE WEAKNESS OF LOWER EXTREMITY: ICD-10-CM

## 2025-01-10 DIAGNOSIS — M25.551 RIGHT HIP PAIN: Primary | ICD-10-CM

## 2025-01-10 DIAGNOSIS — R26.9 ABNORMALITY OF GAIT: ICD-10-CM

## 2025-01-10 PROCEDURE — 97110 THERAPEUTIC EXERCISES: CPT | Mod: GP,CQ

## 2025-01-10 NOTE — PROGRESS NOTES
Physical Therapy Treatment      Patient Name: Iona Hilton  MRN: [id number]  Today's Date: 1/10/2025  Visit #7  Time Calculation  Start Time: 1430  Stop Time: 1515  Time Calculation (min): 45 min    Insurance:  Visit Limit: Med nec  Date Range: 2/26/2025  Co-Pay: None    Assessment:  Patient tolerated session well today and has made good progress towards goals.   Pt. Is reporting an increase in functional levels.  Sit to stands improved within session. Patient continues to be a good candidate for skilled PT in order to further address listed impairments.   Patient's response to session: No change in pain, Increased ROM/joint mobility, Increase motor control, and Increased knowledge and understanding    Plan:  Continue per POC.    Current Problem:   1. Right hip pain  Follow Up In Physical Therapy      2. Muscle weakness of lower extremity  Follow Up In Physical Therapy      3. Abnormality of gait  Follow Up In Physical Therapy          Subjective   Patient reports she is showering without shower chair, doing steps reciprocating and is now sleeping upstairs.  She is 11.2  weeks s/p R hip hemiarthroplasty on 10/23/2024.              Objective      Gait: reduced stance time and hip extension with and without cane, cues for arm swing.    Treatments:  Therapeutic Exercise (65810): 40 minutes  Extended HEP review  Upright bike x 6 min  Ambulation in clinic with cues to relax Ue's and for arm swing  PROM hip flexion, and abduction  STS from chair with foam pad, cues on technique  High marching and retro walking along hallway.    Manual Therapy (79799): 5 minutes  STM to right anterior hip region x 5 minutes.    Neuromuscular Re-education (28430):   minutes    Education and discussion on HEP and treatment regarding the benefits related to current condition, POC, pathophysiology, and precautions    *added to HEP

## 2025-01-13 ENCOUNTER — TREATMENT (OUTPATIENT)
Dept: PHYSICAL THERAPY | Facility: CLINIC | Age: 75
End: 2025-01-13
Payer: MEDICARE

## 2025-01-13 DIAGNOSIS — M62.81 MUSCLE WEAKNESS OF LOWER EXTREMITY: ICD-10-CM

## 2025-01-13 DIAGNOSIS — M25.551 RIGHT HIP PAIN: Primary | ICD-10-CM

## 2025-01-13 DIAGNOSIS — R26.9 ABNORMALITY OF GAIT: ICD-10-CM

## 2025-01-13 PROCEDURE — 97110 THERAPEUTIC EXERCISES: CPT | Mod: GP,CQ

## 2025-01-13 NOTE — PROGRESS NOTES
Physical Therapy Treatment      Patient Name: Iona Hilton  MRN: [id number]  Today's Date: 1/13/2025  Visit #8  Time Calculation  Start Time: 1300  Stop Time: 1342  Time Calculation (min): 42 min    Insurance:  Visit Limit: Med nec  Date Range: 2/26/2025  Co-Pay: None    Assessment:  Patient tolerated session well today and has made good progress towards goals.  Improved gait mechanics noted especially when she is warmed up.  Pt. Con't to report increased functional gains.   Patient's response to session: No change in pain, Increased ROM/joint mobility, Increase motor control, and Increased knowledge and understanding    Plan:  Continue per POC.    Current Problem:   1. Right hip pain  Follow Up In Physical Therapy      2. Muscle weakness of lower extremity  Follow Up In Physical Therapy      3. Abnormality of gait  Follow Up In Physical Therapy          Subjective   Patient reports she is able to put her sock on her right foot now and was able to walk down the steps  without the cane using a reciprocatring gait pattern.  She is 11.5  weeks s/p R hip hemiarthroplasty on 10/23/2024.     Pain is 1/0 only with walking.         Objective      Gait: reduced stance time and hip extension with and without cane, cues for arm swing.    Treatments:  Therapeutic Exercise (53827): 42 minutes  Extended HEP review  Nu step level 4 x 6 minutes  Hooklying hip flexion with yellow t-band x 30 reps  Hooklying hip abduction yellow t-band x 15 reps bilat  Bridging x 20 reps  Bridge with march x 5 reps  Ambulation in clinic with improved gait mechanics   PROM hip flexion, and abduction and IR/ER  Calf raises x 20 reps  SLS on right LE and tap ups to 6 inch step x 10 reps  6 inch forward and lateral step ups.  Manual Therapy (01734):   minutes      Neuromuscular Re-education (05216):   minutes    Education and discussion on HEP and treatment regarding the benefits related to current condition, POC, pathophysiology, and  precautions    *added to HEP

## 2025-01-16 ENCOUNTER — TREATMENT (OUTPATIENT)
Dept: PHYSICAL THERAPY | Facility: CLINIC | Age: 75
End: 2025-01-16
Payer: MEDICARE

## 2025-01-16 DIAGNOSIS — R26.9 ABNORMALITY OF GAIT: ICD-10-CM

## 2025-01-16 DIAGNOSIS — M62.81 MUSCLE WEAKNESS OF LOWER EXTREMITY: ICD-10-CM

## 2025-01-16 DIAGNOSIS — M25.551 RIGHT HIP PAIN: Primary | ICD-10-CM

## 2025-01-16 PROCEDURE — 97110 THERAPEUTIC EXERCISES: CPT | Mod: GP | Performed by: PHYSICAL THERAPIST

## 2025-01-16 ASSESSMENT — PAIN SCALES - GENERAL: PAINLEVEL_OUTOF10: 0 - NO PAIN

## 2025-01-16 ASSESSMENT — PAIN - FUNCTIONAL ASSESSMENT: PAIN_FUNCTIONAL_ASSESSMENT: 0-10

## 2025-01-16 NOTE — PROGRESS NOTES
Physical Therapy Treatment      Patient Name: Iona Hilton  MRN: [id number]  Today's Date: 1/16/2025  Visit #9  Time Calculation  Start Time: 1400  Stop Time: 1445  Time Calculation (min): 45 min    Insurance:  Visit Limit: Med nec  Date Range: 2/26/2025  Co-Pay: None    Assessment:  Patient showing improvements in hip ROM and activity tolerance. Today's session focused on strength, ROM, neuromuscular control, endurance, joint mobility, soft tissue mobilization, flexibility, gait, and balance. Patient demonstrated good tolerance to session this date. They are demonstrating good progress in skilled rehabilitation at this time, though they are still limited by decreased muscle performance, decreased ROM, decreased activity tolerance, pain, participation restrictions, impaired balance/gait, and difficulty with ADL completion. Patient continues to be a good candidate for skilled PT in order to further address listed impairments. Updated HEP to reflect today's session. All questions answered.    Patient's response to session: No change in pain, Increased ROM/joint mobility, Increase motor control, and Increased knowledge and understanding    Plan:  Continue per POC.    Current Problem:   1. Right hip pain  Follow Up In Physical Therapy      2. Muscle weakness of lower extremity  Follow Up In Physical Therapy      3. Abnormality of gait  Follow Up In Physical Therapy          Subjective   Patient is doing better overall. She continues to improve with funcitonal mobility. No pain today. She is 12.1 weeks s/p R hip hemiarthroplasty on 10/23/2024.    Pain Assessment: 0-10  0-10 (Numeric) Pain Score: 0 - No pain    Precautions  Precautions Comment: None    Objective   Hip ROM (L/R)  Flexion: 125°/105°  Abduction: 45°/40°  Extension: 10°/4°  External Rotation: 45°/35°  Internal rotation: 45°/30°      Hamstring Length: WNL     Gait: reduced stance time and hip extension with and without cane -  improving    Treatments:  Therapeutic Exercise (50540): 31 minutes  Extended HEP review  Upright bike x 5 min  Ambulation in clinic  PROM hip flexion, ER, IR  STS  Side steps, yellow TB*  Lunge on stairs*  [place], yellow TB  Bridge march    Manual Therapy (49308): 9 minutes  Long axis distraction  AP hip mobilization with flexion    Neuromuscular Re-education (49138):  0 minutes  Not performed    Education and discussion on HEP and treatment regarding the benefits related to current condition, POC, pathophysiology, and precautions    *added to HEP

## 2025-01-20 ENCOUNTER — TREATMENT (OUTPATIENT)
Dept: PHYSICAL THERAPY | Facility: CLINIC | Age: 75
End: 2025-01-20
Payer: MEDICARE

## 2025-01-20 DIAGNOSIS — M25.551 RIGHT HIP PAIN: Primary | ICD-10-CM

## 2025-01-20 DIAGNOSIS — R26.9 ABNORMALITY OF GAIT: ICD-10-CM

## 2025-01-20 DIAGNOSIS — M62.81 MUSCLE WEAKNESS OF LOWER EXTREMITY: ICD-10-CM

## 2025-01-20 PROCEDURE — 97110 THERAPEUTIC EXERCISES: CPT | Mod: GP,CQ

## 2025-01-20 NOTE — PROGRESS NOTES
Physical Therapy Treatment      Patient Name: Iona Hilton  MRN: [id number]  Today's Date: 1/20/2025  Visit #10  Time Calculation  Start Time: 1300  Stop Time: 1340  Time Calculation (min): 40 min    Insurance:  Visit Limit: Med nec  Date Range: 2/26/2025  Co-Pay: None    Assessment:  Patient showing improvements in hip ROM and activity tolerance. Pt. improved within session with her walking and increasing stance time and hip extension with her walking.  She will con't working on this at home.  Patient continues to be a good candidate for skilled PT in order to further address listed impairments. Updated HEP to reflect today's session. All questions answered.    Patient's response to session: No change in pain, Increased ROM/joint mobility, Increase motor control, and Increased knowledge and understanding    Plan:  Continue per POC.    Current Problem:   1. Right hip pain  Follow Up In Physical Therapy      2. Muscle weakness of lower extremity  Follow Up In Physical Therapy      3. Abnormality of gait  Follow Up In Physical Therapy          Subjective   Patient reports some irritation that started last night and had a harder time sleeping as it was aching.  She is 12.5 weeks  s/p R hip hemiarthroplasty on 10/23/2024.              Objective      Gait: reduced stance time and hip extension without cane.    Treatments:  Therapeutic Exercise (80363): 40 minutes  Extended HEP review  Bridging with red t-band around thighs  Hook lying hip abduction with red t-band 2x 10 reps  Hook lying marching with red t-band x 20 reps.  Upright bike x 5 min  Ambulation in clinic  PROM hip flexion, ER, IR  STS from elevated mat table  Side stepping without resistance today.  Walking in // bars with emphasis on increasing stance time on the right leg and working on increasing hip extension.   Retro walking in // bars    Manual Therapy (44148):   minutes    Neuromuscular Re-education (78293):  0 minutes  Not performed    Education and  discussion on HEP and treatment regarding the benefits related to current condition, POC, pathophysiology, and precautions    *added to HEP

## 2025-01-23 ENCOUNTER — TREATMENT (OUTPATIENT)
Dept: PHYSICAL THERAPY | Facility: CLINIC | Age: 75
End: 2025-01-23
Payer: MEDICARE

## 2025-01-23 DIAGNOSIS — M25.551 RIGHT HIP PAIN: Primary | ICD-10-CM

## 2025-01-23 DIAGNOSIS — R26.9 ABNORMALITY OF GAIT: ICD-10-CM

## 2025-01-23 DIAGNOSIS — M62.81 MUSCLE WEAKNESS OF LOWER EXTREMITY: ICD-10-CM

## 2025-01-23 PROCEDURE — 97110 THERAPEUTIC EXERCISES: CPT | Mod: GP | Performed by: PHYSICAL THERAPIST

## 2025-01-23 PROCEDURE — 97140 MANUAL THERAPY 1/> REGIONS: CPT | Mod: GP | Performed by: PHYSICAL THERAPIST

## 2025-01-23 ASSESSMENT — PAIN - FUNCTIONAL ASSESSMENT: PAIN_FUNCTIONAL_ASSESSMENT: 0-10

## 2025-01-23 ASSESSMENT — PAIN SCALES - GENERAL: PAINLEVEL_OUTOF10: 2

## 2025-01-23 NOTE — PROGRESS NOTES
Physical Therapy Treatment and Reassesment     Patient Name: Iona Hilton  MRN: [id number]  Today's Date: 1/23/2025  Visit #11  Time Calculation  Start Time: 1404  Stop Time: 1445  Time Calculation (min): 41 min    Insurance:  Visit Limit: Med nec  Date Range: 2/26/2025  Co-Pay: None    Assessment:  Patient and I discussed HEP and POC in depth today. Progressed exercise tolerated well without issues. Today's session focused on strength, ROM, neuromuscular control, endurance, joint mobility, soft tissue mobilization, flexibility, gait, and balance. Patient demonstrated good tolerance to session this date. They are demonstrating good progress in skilled rehabilitation at this time, though they are still limited by decreased muscle performance, decreased ROM, decreased activity tolerance, pain, participation restrictions, impaired balance/gait, and difficulty with ADL completion. Patient continues to be a good candidate for skilled PT in order to further address listed impairments. Updated HEP to reflect today's session. All questions answered.    Patient's response to session: No change in pain, Increased ROM/joint mobility, Increase motor control, and Increased knowledge and understanding    Plan:  Continue per POC.    Current Problem:   1. Right hip pain  Follow Up In Physical Therapy      2. Muscle weakness of lower extremity  Follow Up In Physical Therapy      3. Abnormality of gait  Follow Up In Physical Therapy          Subjective   Patient reports she continues to improve. Has been sore with progressed HEP lately. She is 13.1 weeks s/p R hip hemiarthroplasty on 10/23/2024.    Pain Assessment: 0-10  0-10 (Numeric) Pain Score: 2    Precautions  Precautions Comment: None    Objective   Hip ROM (L/R)  Flexion: 125°/110°  Abduction: 45°/40°  Extension: 10°/4°  External Rotation: 45°/35°  Internal rotation: 45°/30°      Hamstring Length: WNL     Gait: reduced stance time and hip extension with and without cane -  improving    2cm leg length difference    Treatments:  Therapeutic Exercise (84438): 32 minutes  Extended HEP review  NuStep x 6 min  Ambulation in clinic  PROM hip flexion, ER, IR  STS  Side steps, red TB*  [place] walks, red TB*  Band walks, red Tb*  Lunge on stairs*  Hip flexor stretch  Education on heel lift    Manual Therapy (66917): 8 minutes  AP hip mobilization with flexion    Neuromuscular Re-education (31543):  0 minutes  Not performed    Education and discussion on HEP and treatment regarding the benefits related to current condition, POC, pathophysiology, and precautions    *added to HEP

## 2025-01-27 ENCOUNTER — TREATMENT (OUTPATIENT)
Dept: PHYSICAL THERAPY | Facility: CLINIC | Age: 75
End: 2025-01-27
Payer: MEDICARE

## 2025-01-27 DIAGNOSIS — M62.81 MUSCLE WEAKNESS OF LOWER EXTREMITY: ICD-10-CM

## 2025-01-27 DIAGNOSIS — R26.9 ABNORMALITY OF GAIT: ICD-10-CM

## 2025-01-27 DIAGNOSIS — M25.551 RIGHT HIP PAIN: Primary | ICD-10-CM

## 2025-01-27 PROCEDURE — 97110 THERAPEUTIC EXERCISES: CPT | Mod: GP,CQ

## 2025-01-27 PROCEDURE — 97112 NEUROMUSCULAR REEDUCATION: CPT | Mod: GP,CQ

## 2025-01-27 NOTE — PROGRESS NOTES
Physical Therapy Treatment and Reassesment     Patient Name: Iona Hilton  MRN: [id number]  Today's Date: 1/27/2025  Visit #12  Time Calculation  Start Time: 1300  Stop Time: 1345  Time Calculation (min): 45 min    Insurance:  Visit Limit: Med nec  Date Range: 2/26/2025  Co-Pay: None    Assessment:  Pt. Does well with exercises and walking when she really focusses on her technique.  She reports decreased pain when she focusses on her walking.   Today's session focused on strength, ROM, neuromuscular control, endurance, joint mobility, soft tissue mobilization, flexibility, gait, and balance. Patient demonstrated good tolerance to session this date. They are demonstrating good progress in skilled rehabilitation at this time, though they are still limited by decreased muscle performance, decreased ROM, decreased activity tolerance, pain, participation restrictions, impaired balance/gait, and difficulty with ADL completion. Patient continues to be a good candidate for skilled PT in order to further address listed impairments. Updated HEP to reflect today's session. All questions answered.    Patient's response to session: No change in pain, Increased ROM/joint mobility, Increase motor control, and Increased knowledge and understanding    Plan:  Continue per POC.    Current Problem:   1. Right hip pain  Follow Up In Physical Therapy      2. Muscle weakness of lower extremity  Follow Up In Physical Therapy      3. Abnormality of gait  Follow Up In Physical Therapy          Subjective    She is 13.5 weeks s/p R hip hemiarthroplasty on 10/23/2024.    Pt. Reports right Knee hurts when going down the steps.  Hip is sore and stiff when she wakes up in the morning.  Has been working on the new exercises.              Objective      Gait: reduced stance time and hip extension with and without cane - improving      Treatments:  Therapeutic Exercise (52914): 30 minutes  NuStep x 6 min  Ambulation in clinic  STS from elevated  table  Lunge on stairs  Hip flexor stretch x 2 reps  4 and 6 inch step ups  4 inch step downs    Manual Therapy (29970):   minutes  Not performed.    Neuromuscular Re-education (98308): 15minutes  Tandem stand both with head turns for increased challenge.  Standing on foam pad NBOS and mod tandem stand EO and horizontal and vertical head turns    Education and discussion on HEP and treatment regarding the benefits related to current condition, POC, pathophysiology, and precautions    *added to HEP

## 2025-01-30 ENCOUNTER — TREATMENT (OUTPATIENT)
Dept: PHYSICAL THERAPY | Facility: CLINIC | Age: 75
End: 2025-01-30
Payer: MEDICARE

## 2025-01-30 DIAGNOSIS — M62.81 MUSCLE WEAKNESS OF LOWER EXTREMITY: ICD-10-CM

## 2025-01-30 DIAGNOSIS — R26.9 ABNORMALITY OF GAIT: ICD-10-CM

## 2025-01-30 DIAGNOSIS — M25.551 RIGHT HIP PAIN: Primary | ICD-10-CM

## 2025-01-30 PROCEDURE — 97110 THERAPEUTIC EXERCISES: CPT | Mod: GP | Performed by: PHYSICAL THERAPIST

## 2025-01-30 ASSESSMENT — PAIN - FUNCTIONAL ASSESSMENT: PAIN_FUNCTIONAL_ASSESSMENT: 0-10

## 2025-01-30 ASSESSMENT — PAIN SCALES - GENERAL: PAINLEVEL_OUTOF10: 2

## 2025-01-30 NOTE — PROGRESS NOTES
Physical Therapy Treatment     Patient Name: Iona Hilton  MRN: [id number]  Today's Date: 1/30/2025  Visit #13  Time Calculation  Start Time: 1400  Stop Time: 1430  Time Calculation (min): 30 min    Insurance:  Visit Limit: Med nec  Date Range: 2/26/2025  Co-Pay: None    Assessment:  Patient seems to be dealing with gluteal tendinopathy. Reduced HEP today for more focus on this. Today's session focused on strength, ROM, neuromuscular control, endurance, joint mobility, soft tissue mobilization, flexibility, gait, and balance. Patient demonstrated good tolerance to session this date. They are demonstrating good progress in skilled rehabilitation at this time, though they are still limited by decreased muscle performance, decreased ROM, decreased activity tolerance, pain, participation restrictions, impaired balance/gait, and difficulty with ADL completion. Patient continues to be a good candidate for skilled PT in order to further address listed impairments. Updated HEP to reflect today's session. All questions answered.    Patient's response to session: No change in pain, Increased ROM/joint mobility, Increase motor control, and Increased knowledge and understanding    Plan:  Continue per POC.    Current Problem:   1. Right hip pain  Follow Up In Physical Therapy      2. Muscle weakness of lower extremity  Follow Up In Physical Therapy      3. Abnormality of gait  Follow Up In Physical Therapy          Subjective   Patient reports she continues to have pain in lateral hip. Has been working on HEP. Pain is worse with stair negotiation. She is 14.1 weeks s/p R hip hemiarthroplasty on 10/23/2024.    Pain Assessment: 0-10  0-10 (Numeric) Pain Score: 2    Precautions  Precautions Comment: None    Objective   Hip ROM (L/R)  Flexion: 125°/110°  Abduction: 45°/40°  Extension: 10°/4°  External Rotation: 45°/35°  Internal rotation: 45°/30°      Hamstring Length: WNL     Gait: reduced stance time and hip extension with and  "without cane - improving    2cm leg length difference    TTP gluteal insertion    Treatments:  Therapeutic Exercise (99034): 25 minutes  Extended HEP review  Recumbent bike x 6 min  Ambulation in clinic  Sidelying clamshell isometrics, 3\" on/5\" off*  Education on tendinopathy and pathophys    Manual Therapy (58718):   minutes  AP hip mobilization with flexion    Neuromuscular Re-education (03582):  0 minutes  Not performed    Education and discussion on HEP and treatment regarding the benefits related to current condition, POC, pathophysiology, and precautions    *added to HEP    "

## 2025-02-03 ENCOUNTER — TREATMENT (OUTPATIENT)
Dept: PHYSICAL THERAPY | Facility: CLINIC | Age: 75
End: 2025-02-03
Payer: MEDICARE

## 2025-02-03 DIAGNOSIS — M62.81 MUSCLE WEAKNESS OF LOWER EXTREMITY: ICD-10-CM

## 2025-02-03 DIAGNOSIS — M25.551 RIGHT HIP PAIN: Primary | ICD-10-CM

## 2025-02-03 DIAGNOSIS — R26.9 ABNORMALITY OF GAIT: ICD-10-CM

## 2025-02-03 PROCEDURE — 97110 THERAPEUTIC EXERCISES: CPT | Mod: GP,CQ

## 2025-02-03 NOTE — PROGRESS NOTES
"Physical Therapy Treatment     Patient Name: Iona Hilton  MRN: [id number]  Today's Date: 2/3/2025  Visit #14  Time Calculation  Start Time: 0945  Stop Time: 1011  Time Calculation (min): 26 min    Insurance:  Visit Limit: Med nec  Date Range: 2/26/2025  Co-Pay: None    Assessment:  We kept today's session simple as she has been having some increased symptoms although she is reporting decreased symptoms as she is only focusing on one exercise for now.   Has been working on her walking at home.  Pt.'s walking is initially antalgic but improves the more she walks.  Patient's response to session: No change in pain, Increased ROM/joint mobility, Increase motor control, and Increased knowledge and understanding    Plan:  Continue per POC.    Current Problem:   1. Right hip pain  Follow Up In Physical Therapy      2. Muscle weakness of lower extremity  Follow Up In Physical Therapy      3. Abnormality of gait  Follow Up In Physical Therapy          Subjective     Pt. States her hip is getting better a little bit everyday.   Pt. Reports doing clamshells.   She is 14.5 weeks s/p R hip hemiarthroplasty on 10/23/2024.              Objective    Gait: reduced stance time and hip extension with and without cane - improving    Treatments:  Therapeutic Exercise (40190): 26 minutes  Recumbent bike x 8 min  Ambulation in clinic 240' x 2 cues for increased stance time on right  Sidelying clamshell isometrics, 3\" on/5\" off* x 10 reps  No other exercises performed this session.     Manual Therapy (73303):   minutes  Neuromuscular Re-education (67148):  0 minutes  Not performed    Education and discussion on HEP and treatment regarding the benefits related to current condition, POC, pathophysiology, and precautions    *added to HEP    "

## 2025-02-07 ENCOUNTER — TREATMENT (OUTPATIENT)
Dept: PHYSICAL THERAPY | Facility: CLINIC | Age: 75
End: 2025-02-07
Payer: MEDICARE

## 2025-02-07 DIAGNOSIS — M25.551 RIGHT HIP PAIN: Primary | ICD-10-CM

## 2025-02-07 DIAGNOSIS — M62.81 MUSCLE WEAKNESS OF LOWER EXTREMITY: ICD-10-CM

## 2025-02-07 DIAGNOSIS — R26.9 ABNORMALITY OF GAIT: ICD-10-CM

## 2025-02-07 PROCEDURE — 97110 THERAPEUTIC EXERCISES: CPT | Mod: GP | Performed by: PHYSICAL THERAPIST

## 2025-02-07 ASSESSMENT — PAIN SCALES - GENERAL: PAINLEVEL_OUTOF10: 1

## 2025-02-07 ASSESSMENT — PAIN - FUNCTIONAL ASSESSMENT: PAIN_FUNCTIONAL_ASSESSMENT: 0-10

## 2025-02-07 NOTE — PROGRESS NOTES
Physical Therapy Treatment     Patient Name: Iona Hilton  MRN: [id number]  Today's Date: 2/7/2025  Visit #15  Time Calculation  Start Time: 0951  Stop Time: 1030  Time Calculation (min): 39 min    Insurance:  Visit Limit: Med nec  Date Range: 2/26/2025  Co-Pay: None    Assessment:  Extensive HEP review today. Today's session focused on strength, ROM, neuromuscular control, endurance, joint mobility, soft tissue mobilization, flexibility, gait, and balance. Patient demonstrated good tolerance to session this date. They are demonstrating good progress in skilled rehabilitation at this time, though they are still limited by decreased muscle performance, decreased ROM, decreased activity tolerance, pain, participation restrictions, impaired balance/gait, and difficulty with ADL completion. Patient continues to be a good candidate for skilled PT in order to further address listed impairments. Updated HEP to reflect today's session. All questions answered.    Patient's response to session: No change in pain, Increased ROM/joint mobility, Increase motor control, and Increased knowledge and understanding    Plan:  Continue per POC.    Current Problem:   1. Right hip pain  Follow Up In Physical Therapy      2. Muscle weakness of lower extremity  Follow Up In Physical Therapy      3. Abnormality of gait  Follow Up In Physical Therapy          Subjective   Patient reports she is feeling better. Has been getting some pain with stair negotiation, though she is now able to carry items on the stairs. She has been driving as well. She is 15.2 weeks s/p R hip hemiarthroplasty on 10/23/2024.    Pain Assessment: 0-10  0-10 (Numeric) Pain Score: 1    Precautions  Precautions Comment: None    Objective   Hip ROM (L/R)  Flexion: 125°/110°  Abduction: 45°/40°  Extension: 10°/4°  External Rotation: 45°/35°  Internal rotation: 45°/30°      Hamstring Length: WNL     Gait: mild reduction in hip ext    2cm leg length difference    TTP  gluteal insertion - improving    Treatments:  Therapeutic Exercise (94295): 35 minutes  Extended HEP review  Recumbent bike x 5 min  [place]*  Ambulation in clinic  STS*  Side steps, yellow TB*    Manual Therapy (25743): 3 minutes  AP hip mobilization with flexion    Neuromuscular Re-education (02447):  0 minutes  Not performed    Education and discussion on HEP and treatment regarding the benefits related to current condition, POC, pathophysiology, and precautions    *added to HEP

## 2025-02-10 ENCOUNTER — APPOINTMENT (OUTPATIENT)
Dept: PHYSICAL THERAPY | Facility: CLINIC | Age: 75
End: 2025-02-10
Payer: MEDICARE

## 2025-02-10 DIAGNOSIS — M62.81 MUSCLE WEAKNESS OF LOWER EXTREMITY: ICD-10-CM

## 2025-02-10 DIAGNOSIS — R26.9 ABNORMALITY OF GAIT: ICD-10-CM

## 2025-02-10 DIAGNOSIS — M25.551 RIGHT HIP PAIN: Primary | ICD-10-CM

## 2025-02-14 ENCOUNTER — TREATMENT (OUTPATIENT)
Dept: PHYSICAL THERAPY | Facility: CLINIC | Age: 75
End: 2025-02-14
Payer: MEDICARE

## 2025-02-14 DIAGNOSIS — M62.81 MUSCLE WEAKNESS OF LOWER EXTREMITY: ICD-10-CM

## 2025-02-14 DIAGNOSIS — R26.9 ABNORMALITY OF GAIT: ICD-10-CM

## 2025-02-14 DIAGNOSIS — M25.551 RIGHT HIP PAIN: Primary | ICD-10-CM

## 2025-02-14 PROCEDURE — 97110 THERAPEUTIC EXERCISES: CPT | Mod: GP | Performed by: PHYSICAL THERAPIST

## 2025-02-14 ASSESSMENT — PAIN SCALES - GENERAL: PAINLEVEL_OUTOF10: 0 - NO PAIN

## 2025-02-14 ASSESSMENT — PAIN - FUNCTIONAL ASSESSMENT: PAIN_FUNCTIONAL_ASSESSMENT: 0-10

## 2025-02-14 NOTE — PROGRESS NOTES
Physical Therapy Treatment     Patient Name: Iona Hilton  MRN: [id number]  Today's Date: 2/14/2025  Visit #16  Time Calculation  Start Time: 0941  Stop Time: 1013  Time Calculation (min): 32 min    Insurance:  Visit Limit: Med nec  Date Range: 2/26/2025  Co-Pay: None    Assessment:  Extensive HEP review today. Today's session focused on strength, ROM, neuromuscular control, endurance, joint mobility, soft tissue mobilization, flexibility, gait, and balance. Patient demonstrated good tolerance to session this date. They are demonstrating good progress in skilled rehabilitation at this time, though they are still limited by decreased muscle performance, decreased ROM, decreased activity tolerance, pain, participation restrictions, impaired balance/gait, and difficulty with ADL completion. Patient continues to be a good candidate for skilled PT in order to further address listed impairments. Updated HEP to reflect today's session. All questions answered.    Patient's response to session: No change in pain, Increased ROM/joint mobility, Increase motor control, and Increased knowledge and understanding    Plan:  Continue per POC.    Current Problem:   1. Right hip pain  Follow Up In Physical Therapy      2. Muscle weakness of lower extremity  Follow Up In Physical Therapy      3. Abnormality of gait  Follow Up In Physical Therapy          Subjective   Patient reports she is doing much better. She has been working on HEP. Stairs are still challenging but better. She is 16.2 weeks s/p R hip hemiarthroplasty on 10/23/2024.    Pain Assessment: 0-10  0-10 (Numeric) Pain Score: 0 - No pain    Precautions  Precautions Comment: None    Objective   Hip ROM (L/R)  Flexion: 125°/110°  Abduction: 45°/40°  Extension: 10°/4°  External Rotation: 45°/35°  Internal rotation: 45°/30°      Hamstring Length: WNL     Gait: mild reduction in hip ext    2cm leg length difference    TTP gluteal insertion -  improving    Treatments:  Therapeutic Exercise (32225): 25 minutes  Extended HEP review  Upright bike x 5 min  STS  Wall squats*  PROM hip flexion  Figure 4 stretch  Stair negotiation and step ups    Manual Therapy (68673): 2 minutes  Long axis distraction    Neuromuscular Re-education (33296):  0 minutes  Not performed    Education and discussion on HEP and treatment regarding the benefits related to current condition, POC, pathophysiology, and precautions    *added to HEP

## 2025-02-17 ENCOUNTER — APPOINTMENT (OUTPATIENT)
Dept: PHYSICAL THERAPY | Facility: CLINIC | Age: 75
End: 2025-02-17
Payer: MEDICARE

## 2025-02-17 DIAGNOSIS — M62.81 MUSCLE WEAKNESS OF LOWER EXTREMITY: ICD-10-CM

## 2025-02-17 DIAGNOSIS — M25.551 RIGHT HIP PAIN: Primary | ICD-10-CM

## 2025-02-17 DIAGNOSIS — R26.9 ABNORMALITY OF GAIT: ICD-10-CM

## 2025-02-21 ENCOUNTER — TREATMENT (OUTPATIENT)
Dept: PHYSICAL THERAPY | Facility: CLINIC | Age: 75
End: 2025-02-21
Payer: MEDICARE

## 2025-02-21 DIAGNOSIS — R26.9 ABNORMALITY OF GAIT: ICD-10-CM

## 2025-02-21 DIAGNOSIS — M25.551 RIGHT HIP PAIN: Primary | ICD-10-CM

## 2025-02-21 DIAGNOSIS — M62.81 MUSCLE WEAKNESS OF LOWER EXTREMITY: ICD-10-CM

## 2025-02-21 PROCEDURE — 97110 THERAPEUTIC EXERCISES: CPT | Mod: GP | Performed by: PHYSICAL THERAPIST

## 2025-02-21 ASSESSMENT — PAIN - FUNCTIONAL ASSESSMENT: PAIN_FUNCTIONAL_ASSESSMENT: 0-10

## 2025-02-21 ASSESSMENT — PAIN SCALES - GENERAL: PAINLEVEL_OUTOF10: 0 - NO PAIN

## 2025-02-21 NOTE — PROGRESS NOTES
Physical Therapy Treatment and Reassessment     Patient Name: Iona Hilton  MRN: [id number]  Today's Date: 2/21/2025  Visit #17  Time Calculation  Start Time: 0936  Stop Time: 1015  Time Calculation (min): 39 min    Insurance:  Visit Limit: Med nec  Date Range: 2/26/2025  Co-Pay: None    Assessment:  Progressed SL training without issues. Today's session focused on strength, ROM, neuromuscular control, endurance, joint mobility, soft tissue mobilization, flexibility, gait, and balance. Patient demonstrated good tolerance to session this date. They are demonstrating good progress in skilled rehabilitation at this time, though they are still limited by decreased muscle performance, decreased ROM, decreased activity tolerance, pain, participation restrictions, impaired balance/gait, and difficulty with ADL completion. Patient continues to be a good candidate for skilled PT in order to further address listed impairments. Updated HEP to reflect today's session. All questions answered.    Patient's response to session: No change in pain, Increased ROM/joint mobility, Increase motor control, and Increased knowledge and understanding    Plan:  Continue per POC.    Current Problem:   1. Right hip pain  Follow Up In Physical Therapy      2. Muscle weakness of lower extremity  Follow Up In Physical Therapy      3. Abnormality of gait  Follow Up In Physical Therapy          Subjective   Patient reports continues to improve. Walking around, stair negotiation, and STS are less painful and less challenging. she is doing much better. She has been working on HEP. She is 17.2 weeks s/p R hip hemiarthroplasty on 10/23/2024.    Pain Assessment: 0-10  0-10 (Numeric) Pain Score: 0 - No pain    Precautions  Precautions Comment: None    Objective   Hip ROM (L/R)  Flexion: 125°/110°  Abduction: 45°/40°  Extension: 10°/4°  External Rotation: 45°/35°  Internal rotation: 45°/30°      Hamstring Length: WNL     Gait: mild reduction in hip  "ext    2cm leg length difference    STS with UE support - 5    TTP gluteal insertion - improving    Treatments:  Therapeutic Exercise (78462): 38 minutes  Extended HEP review  NuStep x 7 min  STS  PROM hip flexion  Figure 4 stretch*  SLR - d/c'd  LAQ, 8#*  Step ups, 6\"  Stair negotiation    Manual Therapy (58327):  0 minutes  Not performed    Neuromuscular Re-education (34883):  0 minutes  Not performed    Education and discussion on HEP and treatment regarding the benefits related to current condition, POC, pathophysiology, and precautions    *added to HEP    Goals:  Patient will improve Lower Extremity Functional Scale score to meet minimal detectable change of improvement to improve performance of ADLs. NT     Patient will be independent with home exercise program for proper self-management of condition. ONGOING     Patient will improve active range of motion in deficit areas for ADL completion. - PROGRESSING     Patient will improve strength in deficit areas so patient can work with less pain. - PROGRESSING     Patient will walk without pain or assistive device. - PROGRESSING  "

## 2025-02-24 ENCOUNTER — APPOINTMENT (OUTPATIENT)
Dept: PHYSICAL THERAPY | Facility: CLINIC | Age: 75
End: 2025-02-24
Payer: MEDICARE

## 2025-02-24 DIAGNOSIS — M25.551 RIGHT HIP PAIN: Primary | ICD-10-CM

## 2025-02-24 DIAGNOSIS — R26.9 ABNORMALITY OF GAIT: ICD-10-CM

## 2025-02-24 DIAGNOSIS — M62.81 MUSCLE WEAKNESS OF LOWER EXTREMITY: ICD-10-CM

## 2025-02-28 ENCOUNTER — TREATMENT (OUTPATIENT)
Dept: PHYSICAL THERAPY | Facility: CLINIC | Age: 75
End: 2025-02-28
Payer: MEDICARE

## 2025-02-28 DIAGNOSIS — M25.551 RIGHT HIP PAIN: Primary | ICD-10-CM

## 2025-02-28 DIAGNOSIS — R26.9 ABNORMALITY OF GAIT: ICD-10-CM

## 2025-02-28 DIAGNOSIS — M62.81 MUSCLE WEAKNESS OF LOWER EXTREMITY: ICD-10-CM

## 2025-02-28 PROCEDURE — 97110 THERAPEUTIC EXERCISES: CPT | Mod: GP | Performed by: PHYSICAL THERAPIST

## 2025-02-28 ASSESSMENT — PAIN SCALES - GENERAL: PAINLEVEL_OUTOF10: 0 - NO PAIN

## 2025-02-28 ASSESSMENT — PAIN - FUNCTIONAL ASSESSMENT: PAIN_FUNCTIONAL_ASSESSMENT: 0-10

## 2025-02-28 NOTE — PROGRESS NOTES
Physical Therapy Treatment      Patient Name: Iona Hilton  MRN: [id number]  Today's Date: 2/28/2025  Visit #18  Time Calculation  Start Time: 0945  Stop Time: 1025  Time Calculation (min): 40 min    Insurance:  Visit Limit: Med nec  Date Range: 5/22/2025  Co-Pay: None    Assessment:  Patient demartem decreased hip extension in stance on right during gait. Worked on stretching quad and strengthening gluteus muscles. Today's session focused on strength, ROM, neuromuscular control, endurance, joint mobility, soft tissue mobilization, flexibility, gait, and balance. Patient demonstrated good tolerance to session this date. They are demonstrating good progress in skilled rehabilitation at this time, though they are still limited by decreased muscle performance, decreased ROM, decreased activity tolerance, pain, participation restrictions, impaired balance/gait, and difficulty with ADL completion. Patient continues to be a good candidate for skilled PT in order to further address listed impairments. Updated HEP to reflect today's session. All questions answered.    Patient's response to session: No change in pain, Increased ROM/joint mobility, Increase motor control, and Increased knowledge and understanding    Plan:  Continue per POC.    Current Problem:   1. Right hip pain  Follow Up In Physical Therapy      2. Muscle weakness of lower extremity  Follow Up In Physical Therapy      3. Abnormality of gait  Follow Up In Physical Therapy          Subjective   Patient reports continues to improve and experiencing less pain. Reports difficulty with squatting to floor and soreness along anterolateral hip with prolonged walking.  She has been working on HEP. She is 18.2 weeks s/p R hip hemiarthroplasty on 10/23/2024.    Pain Assessment: 0-10  0-10 (Numeric) Pain Score: 0 - No pain    Precautions  Precautions Comment: none    Objective   Hip ROM (L/R)  Flexion: 125°/110°  Abduction: 45°/40°  Extension: 10°/4°  External Rotation:  45°/35°  Internal rotation: 45°/30°      Hamstring Length: WNL     Gait: mild reduction in hip ext, early heel off    2cm leg length difference    STS with UE support - 5    TTP gluteal insertion - improving    Treatments:  Therapeutic Exercise (70350): 38 minutes  Extended HEP review  Upright bike x 5 min  Ambulation around clinic  Standing hip extension/abduction*  Standing hip flexor stretch*  Figure 4 stretch supine  Body mechanics for squatting to floor    Manual Therapy (85025):  0 minutes  Not performed    Neuromuscular Re-education (02984):  0 minutes  Not performed    Education and discussion on HEP and treatment regarding the benefits related to current condition, POC, pathophysiology, and precautions    *added to HEP    Care provided under direct supervision of Rodney Mcdonough, PT, DPT, OCS, CSCS

## 2025-03-14 DIAGNOSIS — I10 PRIMARY HYPERTENSION: ICD-10-CM

## 2025-03-14 RX ORDER — LISINOPRIL 5 MG/1
5 TABLET ORAL DAILY
Qty: 30 TABLET | Refills: 11 | Status: SHIPPED | OUTPATIENT
Start: 2025-03-14

## 2025-03-14 NOTE — TELEPHONE ENCOUNTER
"Refill Request      Last OV with PCP 11/7/2024     Future Appointments       Date / Time Provider Department Dept Phone    3/28/2025 9:45 AM Rodney Mcdonough, PT [facility] [phone]          No results found for: \"HGBA1C\"  Lab Results   Component Value Date    CHOL 204 (H) 08/14/2023    CHOL 196 01/09/2023    CHOL 162 01/31/2022     Lab Results   Component Value Date    HDL 66.0 08/14/2023    HDL 75.3 01/09/2023    HDL 60.0 01/31/2022     No results found for: \"LDLCALC\"  Lab Results   Component Value Date    TRIG 105 08/14/2023    TRIG 98 01/09/2023    TRIG 84 01/31/2022     No components found for: \"CHOLHDL\"  Lab Results   Component Value Date    TSH 2.95 11/13/2024     Lab Results   Component Value Date    GLUCOSE 95 11/13/2024    CALCIUM 9.6 11/13/2024     11/13/2024    K 3.8 11/13/2024    CO2 26 11/13/2024     11/13/2024    BUN 14 11/13/2024    CREATININE 0.77 11/13/2024       " no

## 2025-03-28 ENCOUNTER — APPOINTMENT (OUTPATIENT)
Dept: PHYSICAL THERAPY | Facility: CLINIC | Age: 75
End: 2025-03-28
Payer: MEDICARE

## 2025-03-28 DIAGNOSIS — M25.551 RIGHT HIP PAIN: Primary | ICD-10-CM

## 2025-03-28 DIAGNOSIS — R26.9 ABNORMALITY OF GAIT: ICD-10-CM

## 2025-03-28 DIAGNOSIS — M62.81 MUSCLE WEAKNESS OF LOWER EXTREMITY: ICD-10-CM

## 2025-03-28 PROCEDURE — 97110 THERAPEUTIC EXERCISES: CPT | Mod: GP | Performed by: PHYSICAL THERAPIST

## 2025-03-28 ASSESSMENT — PAIN SCALES - GENERAL: PAINLEVEL_OUTOF10: 0 - NO PAIN

## 2025-03-28 ASSESSMENT — PAIN - FUNCTIONAL ASSESSMENT: PAIN_FUNCTIONAL_ASSESSMENT: 0-10

## 2025-03-28 NOTE — PROGRESS NOTES
Physical Therapy Treatment      Patient Name: Iona Hilton  MRN: [id number]  Today's Date: 3/28/2025  Visit #19  Time Calculation  Start Time: 0945  Stop Time: 1030  Time Calculation (min): 45 min    Insurance:  Visit Limit: Med [facility]  Date Range: 5/22/2025  Co-Pay: None    Assessment:  Today's session focused on strength, ROM, neuromuscular control, endurance, joint mobility, soft tissue mobilization, flexibility, gait, and balance. Patient demonstrated good tolerance to session this date. They are demonstrating good progress in skilled rehabilitation at this time, though they are still limited by decreased muscle performance, decreased ROM, decreased activity tolerance, pain, participation restrictions, impaired balance/gait, and difficulty with ADL completion. Patient continues to be a good candidate for skilled PT in order to further address listed impairments. Updated HEP to reflect today's session. All questions answered.    Patient's response to session: No change in pain, Increased ROM/joint mobility, Increase motor control, and Increased knowledge and understanding    Plan:  Continue per POC.    Current Problem:   1. Right hip pain  Follow Up In Physical Therapy      2. Muscle weakness of lower extremity  Follow Up In Physical Therapy      3. Abnormality of gait  Follow Up In Physical Therapy          Subjective   Patient reports doing well overall. No hip pain and mainly limited by knee pain.  She has been working on HEP. She is 22.2 weeks s/p R hip hemiarthroplasty on 10/23/2024.    Pain Assessment: 0-10  0-10 (Numeric) Pain Score: 0 - No pain    Precautions  Precautions Comment: none    Objective   Hip ROM (L/R)  Flexion: 125°/110°  Abduction: 45°/40°  Extension: 10°/4°  External Rotation: 45°/40°  Internal rotation: 45°/30°      Hamstring Length: WNL     Gait: mild reduction in hip ext, early heel off - improving    2cm leg length difference    TTP gluteal insertion -  improving    Treatments:  Therapeutic Exercise (02077): 40 minutes  Extended HEP review  Upright bike x 7 min  Ambulation around clinic  Standing clamshells  PB Squat  Step ups 6in*  Anterior tap downs 6in*  TKE*  Standing hip flexor stretch*    Manual Therapy (41345):  0 minutes  Not performed    Neuromuscular Re-education (81685):  0 minutes  Not performed    Education and discussion on HEP and treatment regarding the benefits related to current condition, POC, pathophysiology, and precautions    *added to HEP    Care provided under direct supervision of Rodney Mcdonough, PT, DPT, OCS, CSCS

## 2025-04-23 ENCOUNTER — APPOINTMENT (OUTPATIENT)
Dept: PHYSICAL THERAPY | Facility: CLINIC | Age: 75
End: 2025-04-23
Payer: MEDICARE

## 2025-04-23 DIAGNOSIS — M62.81 MUSCLE WEAKNESS OF LOWER EXTREMITY: ICD-10-CM

## 2025-04-23 DIAGNOSIS — R26.9 ABNORMALITY OF GAIT: ICD-10-CM

## 2025-04-23 DIAGNOSIS — M25.551 RIGHT HIP PAIN: Primary | ICD-10-CM

## 2025-04-23 PROCEDURE — 97110 THERAPEUTIC EXERCISES: CPT | Mod: GP | Performed by: PHYSICAL THERAPIST

## 2025-04-23 ASSESSMENT — PAIN - FUNCTIONAL ASSESSMENT: PAIN_FUNCTIONAL_ASSESSMENT: 0-10

## 2025-04-23 ASSESSMENT — PAIN SCALES - GENERAL: PAINLEVEL_OUTOF10: 2

## 2025-04-23 NOTE — PROGRESS NOTES
Physical Therapy Treatment      Patient Name: Iona Hilton  MRN: [id number]  Today's Date: 4/23/2025  Visit #20  Time Calculation  Start Time: 0935  Stop Time: 1015  Time Calculation (min): 40 min    Insurance:  Visit Limit: Med nec  Date Range: 5/22/2025  Co-Pay: None    Assessment:  Progressed strengthening exercises this visit. ROM improving. Patient wants to continue with therapy monthly to continue to get stronger and work on endurance. Today's session focused on strength, ROM, neuromuscular control, endurance, joint mobility, soft tissue mobilization, flexibility, gait, and balance. Patient demonstrated good tolerance to session this date. They are demonstrating good progress in skilled rehabilitation at this time, though they are still limited by decreased muscle performance, decreased ROM, decreased activity tolerance, pain, participation restrictions, impaired balance/gait, and difficulty with ADL completion. Patient continues to be a good candidate for skilled PT in order to further address listed impairments. Updated HEP to reflect today's session. All questions answered.    Patient's response to session: No change in pain, Increased ROM/joint mobility, Increase motor control, and Increased knowledge and understanding    Plan:  Continue per POC.    Current Problem:   1. Right hip pain  Follow Up In Physical Therapy      2. Muscle weakness of lower extremity  Follow Up In Physical Therapy      3. Abnormality of gait  Follow Up In Physical Therapy          Subjective   Patient reports doing well overall. Reports feeling sore following Easter and being active throughout the day.  She has been working on HEP, but took a few days off due to increased pain. Reports right lower back has been bothering her. Knee is feeling better. She is 6 months s/p R hip hemiarthroplasty on 10/23/2024.    Pain Assessment: 0-10  0-10 (Numeric) Pain Score: 2    Precautions  Precautions Comment: none    Objective   Hip ROM  (L/R)  Flexion: 125°/110°  Abduction: 45°/40°  Extension: 10°/10°  External Rotation: 45°/45°  Internal rotation: 45°/40°      Hamstring Length: WNL     Gait: mild reduction in hip ext, early heel off - improving    2cm leg length difference    4/23/2025 TTP gluteal insertion and right PSIS - improving  Iliopsoas: 4-/5, 4/5  Glut Max (prone) 3+/5, 4-/5    Treatments:  Therapeutic Exercise (08345): 40 minutes  Extended HEP review  Upright bike x 5 min  Single Leg bridge*  Hip IR seated*  Side steps, red TB*  Standing hip flexion, red TB*  TKE*  Education on rest breaks and structuring HEP    Manual Therapy (58063):  0 minutes  Not performed    Neuromuscular Re-education (77824):  0 minutes  Not performed    Education and discussion on HEP and treatment regarding the benefits related to current condition, POC, pathophysiology, and precautions    *added to HEP    Care provided under direct supervision of Rodney Mcdonough, PT, DPT, OCS, CSCS

## 2025-04-25 ENCOUNTER — APPOINTMENT (OUTPATIENT)
Dept: PHYSICAL THERAPY | Facility: CLINIC | Age: 75
End: 2025-04-25
Payer: MEDICARE

## 2025-04-25 DIAGNOSIS — M25.551 RIGHT HIP PAIN: Primary | ICD-10-CM

## 2025-04-25 DIAGNOSIS — R26.9 ABNORMALITY OF GAIT: ICD-10-CM

## 2025-04-25 DIAGNOSIS — M62.81 MUSCLE WEAKNESS OF LOWER EXTREMITY: ICD-10-CM

## 2025-05-13 DIAGNOSIS — Z12.31 ENCOUNTER FOR SCREENING MAMMOGRAM FOR BREAST CANCER: ICD-10-CM

## 2025-05-30 ENCOUNTER — OFFICE VISIT (OUTPATIENT)
Dept: PRIMARY CARE | Facility: CLINIC | Age: 75
End: 2025-05-30
Payer: MEDICARE

## 2025-05-30 ENCOUNTER — APPOINTMENT (OUTPATIENT)
Dept: PHYSICAL THERAPY | Facility: CLINIC | Age: 75
End: 2025-05-30
Payer: MEDICARE

## 2025-05-30 VITALS
WEIGHT: 127.4 LBS | HEART RATE: 113 BPM | HEIGHT: 67 IN | DIASTOLIC BLOOD PRESSURE: 93 MMHG | OXYGEN SATURATION: 99 % | TEMPERATURE: 97.7 F | BODY MASS INDEX: 20 KG/M2 | SYSTOLIC BLOOD PRESSURE: 197 MMHG

## 2025-05-30 DIAGNOSIS — M25.551 RIGHT HIP PAIN: Primary | ICD-10-CM

## 2025-05-30 DIAGNOSIS — R26.9 ABNORMALITY OF GAIT: ICD-10-CM

## 2025-05-30 DIAGNOSIS — M62.81 MUSCLE WEAKNESS OF LOWER EXTREMITY: ICD-10-CM

## 2025-05-30 DIAGNOSIS — R39.9 UTI SYMPTOMS: Primary | ICD-10-CM

## 2025-05-30 PROCEDURE — 1036F TOBACCO NON-USER: CPT | Performed by: NURSE PRACTITIONER

## 2025-05-30 PROCEDURE — 1160F RVW MEDS BY RX/DR IN RCRD: CPT | Performed by: NURSE PRACTITIONER

## 2025-05-30 PROCEDURE — 3008F BODY MASS INDEX DOCD: CPT | Performed by: NURSE PRACTITIONER

## 2025-05-30 PROCEDURE — 97110 THERAPEUTIC EXERCISES: CPT | Mod: GP | Performed by: PHYSICAL THERAPIST

## 2025-05-30 PROCEDURE — 3080F DIAST BP >= 90 MM HG: CPT | Performed by: NURSE PRACTITIONER

## 2025-05-30 PROCEDURE — 99213 OFFICE O/P EST LOW 20 MIN: CPT | Performed by: NURSE PRACTITIONER

## 2025-05-30 PROCEDURE — 1159F MED LIST DOCD IN RCRD: CPT | Performed by: NURSE PRACTITIONER

## 2025-05-30 PROCEDURE — 3077F SYST BP >= 140 MM HG: CPT | Performed by: NURSE PRACTITIONER

## 2025-05-30 RX ORDER — NITROFURANTOIN 25; 75 MG/1; MG/1
100 CAPSULE ORAL 2 TIMES DAILY
Qty: 14 CAPSULE | Refills: 0 | Status: SHIPPED | OUTPATIENT
Start: 2025-05-30 | End: 2025-06-06

## 2025-05-30 ASSESSMENT — PATIENT HEALTH QUESTIONNAIRE - PHQ9
2. FEELING DOWN, DEPRESSED OR HOPELESS: NOT AT ALL
SUM OF ALL RESPONSES TO PHQ9 QUESTIONS 1 AND 2: 0
1. LITTLE INTEREST OR PLEASURE IN DOING THINGS: NOT AT ALL

## 2025-05-30 ASSESSMENT — PAIN SCALES - GENERAL: PAINLEVEL_OUTOF10: 1

## 2025-05-30 ASSESSMENT — PAIN - FUNCTIONAL ASSESSMENT: PAIN_FUNCTIONAL_ASSESSMENT: 0-10

## 2025-05-30 NOTE — PROGRESS NOTES
Physical Therapy Treatment and Reassessment      Patient Name: Iona Hilton  MRN: [id number]  Today's Date: 5/30/2025  Visit #21  Time Calculation  Start Time: 0930  Stop Time: 1000  Time Calculation (min): 30 min    Insurance:  Visit Limit: Med nec  Date Range: 8/21/2025  Co-Pay: None    Assessment:  Patient continuing to show improvements in ROM and muscle performance. She is having a bit of trouble with getting up and down to garden and clean cupboards. Today's session focused on strength, ROM, neuromuscular control, endurance, joint mobility, soft tissue mobilization, flexibility, gait, and balance. Patient demonstrated good tolerance to session this date. They are demonstrating good progress in skilled rehabilitation at this time, though they are still limited by decreased muscle performance, decreased ROM, decreased activity tolerance, pain, participation restrictions, impaired balance/gait, and difficulty with ADL completion. Patient continues to be a good candidate for skilled PT in order to further address listed impairments. Updated HEP to reflect today's session. All questions answered.    Patient's response to session: No change in pain, Increased ROM/joint mobility, Increase motor control, and Increased knowledge and understanding    Plan:  Continue per POC.    Current Problem:   1. Right hip pain        2. Muscle weakness of lower extremity        3. Abnormality of gait            Subjective   Patient reports she continues to do well. She has been working on HEP. She is having trouble with gardening and cleaning low surfaces. She is 7 months s/p R hip hemiarthroplasty on 10/23/2024.    Pain Assessment: 0-10  0-10 (Numeric) Pain Score: 1    Precautions  Precautions Comment: None    Objective   Hip ROM (L/R)  Flexion: 125°/110°  Abduction: 45°/40°  Extension: 10°/10°  External Rotation: 45°/45°  Internal rotation: 45°/40°      Hamstring Length: WNL     Gait: unremarkable    2cm leg length  difference    Treatments:  Therapeutic Exercise (99782): 28 minutes  Extensive HEP review  Eccentric bridge march*  Side steps, green TB*  Kneeling and floor transfers*  UE supported lunges*  Figure 4 stretch*  Reassessment    Manual Therapy (18312):  0 minutes  Not performed    Neuromuscular Re-education (01131):  0 minutes  Not performed    Education and discussion on HEP and treatment regarding the benefits related to current condition, POC, pathophysiology, and precautions    *added to HEP    Goals:  Patient will improve Lower Extremity Functional Scale score to meet minimal detectable change of improvement to improve performance of ADLs. NT     Patient will be independent with home exercise program for proper self-management of condition. ONGOING     Patient will improve active range of motion in deficit areas for ADL completion. - PROGRESSING     Patient will improve strength in deficit areas so patient can work with less pain. - PROGRESSING     Patient will walk without pain or assistive device. - MET

## 2025-05-30 NOTE — PROGRESS NOTES
"Problem List Items Addressed This Visit    None  Visit Diagnoses         UTI symptoms    -  Primary    empiric macrobid  await UA, cx  prn fu IO    Relevant Medications    nitrofurantoin, macrocrystal-monohydrate, (Macrobid) 100 mg capsule    Other Relevant Orders    Urine Culture    Urinalysis with Reflex Microscopic             Subjective   Patient ID: Iona Hilton is a 75 y.o. female who presents for possible uti (Possible uti/Started several weeks ago on and off/Sx feeling pressure no frequency or urgency no burning clear urine no odor no fever).  HPI  Pressure to urinate  Feels pressure after voiding also  No hematuria    10/23/24:  Urine Culture 20,000 - 80,000 Escherichia coli Abnormal         Resulting Agency: [facility]     Susceptibility     Escherichia coli     MICROSCAN    Amoxicillin/Clavulanate Susceptible    Ampicillin Resistant    Ampicillin/Sulbactam Intermediate    Cefazolin Susceptible    Cefazolin (uncomplicated UTIs only) Susceptible    Ciprofloxacin Susceptible    Gentamicin Susceptible    Nitrofurantoin Susceptible    Piperacillin/Tazobactam Susceptible    Trimethoprim/Sulfamethoxazole Susceptible           Review of Systems   All other systems reviewed and are negative.      BP Readings from Last 3 Encounters:   05/30/25 (!) 197/93   11/21/24 100/70   11/20/24 126/72      Wt Readings from Last 3 Encounters:   05/30/25 57.8 kg (127 lb 6.4 oz)   11/13/24 56.2 kg (124 lb)   11/07/24 56.2 kg (124 lb)      BMI:   Estimated body mass index is 19.95 kg/m² as calculated from the following:    Height as of this encounter: 1.702 m (5' 7\").    Weight as of this encounter: 57.8 kg (127 lb 6.4 oz).    Objective   Physical Exam  Constitutional:       General: She is not in acute distress.  HENT:      Head: Normocephalic and atraumatic.      Nose: Nose normal.      Mouth/Throat:      Mouth: Mucous membranes are moist.   Eyes:      Extraocular Movements: Extraocular movements intact.      Conjunctiva/sclera: " Conjunctivae normal.   Neck:      Vascular: No carotid bruit.   Cardiovascular:      Rate and Rhythm: Normal rate and regular rhythm.      Pulses: Normal pulses.   Pulmonary:      Effort: Pulmonary effort is normal.      Breath sounds: Normal breath sounds.   Abdominal:      General: Bowel sounds are normal. There is no distension.      Palpations: Abdomen is soft. There is no mass.      Tenderness: There is no right CVA tenderness, left CVA tenderness or guarding.      Comments: Tender low left    Musculoskeletal:         General: Normal range of motion.      Cervical back: Normal range of motion and neck supple.   Lymphadenopathy:      Cervical: No cervical adenopathy.   Skin:     General: Skin is warm and dry.   Neurological:      General: No focal deficit present.      Mental Status: She is alert.   Psychiatric:         Mood and Affect: Mood normal.

## 2025-06-01 LAB
APPEARANCE UR: CLEAR
BACTERIA #/AREA URNS HPF: ABNORMAL /HPF
BACTERIA UR CULT: NORMAL
BILIRUB UR QL STRIP: NEGATIVE
COLOR UR: YELLOW
GLUCOSE UR QL STRIP: NEGATIVE
HGB UR QL STRIP: NEGATIVE
HYALINE CASTS #/AREA URNS LPF: ABNORMAL /LPF
KETONES UR QL STRIP: NEGATIVE
LEUKOCYTE ESTERASE UR QL STRIP: ABNORMAL
NITRITE UR QL STRIP: NEGATIVE
PH UR STRIP: 6 [PH] (ref 5–8)
PROT UR QL STRIP: NEGATIVE
RBC #/AREA URNS HPF: ABNORMAL /HPF
SERVICE CMNT-IMP: ABNORMAL
SP GR UR STRIP: 1 (ref 1–1.03)
SQUAMOUS #/AREA URNS HPF: ABNORMAL /HPF
WBC #/AREA URNS HPF: ABNORMAL /HPF

## 2025-07-01 ENCOUNTER — APPOINTMENT (OUTPATIENT)
Dept: PHYSICAL THERAPY | Facility: CLINIC | Age: 75
End: 2025-07-01
Payer: MEDICARE

## 2025-07-01 DIAGNOSIS — R26.9 ABNORMALITY OF GAIT: ICD-10-CM

## 2025-07-01 DIAGNOSIS — M25.551 RIGHT HIP PAIN: Primary | ICD-10-CM

## 2025-07-01 DIAGNOSIS — M62.81 MUSCLE WEAKNESS OF LOWER EXTREMITY: ICD-10-CM

## 2025-07-11 ENCOUNTER — APPOINTMENT (OUTPATIENT)
Dept: PHYSICAL THERAPY | Facility: CLINIC | Age: 75
End: 2025-07-11
Payer: MEDICARE

## 2025-07-11 DIAGNOSIS — R26.9 ABNORMALITY OF GAIT: ICD-10-CM

## 2025-07-11 DIAGNOSIS — M62.81 MUSCLE WEAKNESS OF LOWER EXTREMITY: ICD-10-CM

## 2025-07-11 DIAGNOSIS — M25.551 RIGHT HIP PAIN: Primary | ICD-10-CM

## 2025-07-11 PROCEDURE — 97110 THERAPEUTIC EXERCISES: CPT | Mod: GP | Performed by: PHYSICAL THERAPIST

## 2025-07-11 ASSESSMENT — PAIN - FUNCTIONAL ASSESSMENT: PAIN_FUNCTIONAL_ASSESSMENT: 0-10

## 2025-07-11 ASSESSMENT — PAIN SCALES - GENERAL: PAINLEVEL_OUTOF10: 0 - NO PAIN

## 2025-07-11 NOTE — PROGRESS NOTES
Physical Therapy Treatment     Patient Name: Iona Hilton  MRN: [id number]  Today's Date: 7/11/2025  Visit #22  Time Calculation  Start Time: 1020  Stop Time: 1050  Time Calculation (min): 30 min    Insurance:  Visit Limit: Med nec  Date Range: 8/21/2025  Co-Pay: None    Assessment:  Progressed core training today. Today's session focused on strength, neuromuscular control, and endurance. Patient demonstrated good tolerance to session this date. They are demonstrating good progress in skilled rehabilitation at this time, though they are still limited by decreased muscle performance, decreased activity tolerance, and participation restrictions. Patient continues to be a good candidate for skilled PT in order to further address listed impairments. Updated HEP to reflect today's session. All questions answered.    Patient's response to session: No change in pain, Increase motor control, and Increased knowledge and understanding    Plan:  Continue per POC.    Current Problem:   1. Right hip pain        2. Muscle weakness of lower extremity        3. Abnormality of gait            Subjective   Patient reports she continues to do well. She has been working on HEP. She is having trouble with gardening and cleaning low surfaces. She is 7 months s/p R hip hemiarthroplasty on 10/23/2024.    Pain Assessment: 0-10  0-10 (Numeric) Pain Score: 0 - No pain    Precautions  Precautions Comment: None    Objective   Hip ROM (L/R)  Flexion: 125°/110°  Abduction: 45°/40°  Extension: 10°/10°  External Rotation: 45°/45°  Internal rotation: 45°/40°      Hamstring Length: WNL     Gait: unremarkable    Treatments:  Therapeutic Exercise (35721): 29 minutes  Extensive HEP review  Pallof press, green TB*  B shoulder horiz ABD with TA, red TB*  OH DB press, 5#*    Manual Therapy (08396):  0 minutes  Not performed    Neuromuscular Re-education (51105):  0 minutes  Not performed    Education and discussion on HEP and treatment regarding the  benefits related to current condition, POC, pathophysiology, and precautions    *added to HEP

## 2025-08-11 ENCOUNTER — TELEPHONE (OUTPATIENT)
Dept: PRIMARY CARE | Facility: CLINIC | Age: 75
End: 2025-08-11
Payer: MEDICARE

## 2025-08-11 DIAGNOSIS — R39.9 UTI SYMPTOMS: ICD-10-CM

## 2025-08-13 ENCOUNTER — TELEMEDICINE (OUTPATIENT)
Dept: PRIMARY CARE | Facility: CLINIC | Age: 75
End: 2025-08-13
Payer: MEDICARE

## 2025-08-13 DIAGNOSIS — N39.0 URINARY TRACT INFECTION WITHOUT HEMATURIA, SITE UNSPECIFIED: Primary | ICD-10-CM

## 2025-08-13 LAB
APPEARANCE UR: CLEAR
BACTERIA #/AREA URNS HPF: ABNORMAL /HPF
BACTERIA UR CULT: ABNORMAL
BILIRUB UR QL STRIP: NEGATIVE
COLOR UR: YELLOW
GLUCOSE UR QL STRIP: NEGATIVE
HGB UR QL STRIP: NEGATIVE
HYALINE CASTS #/AREA URNS LPF: ABNORMAL /LPF
KETONES UR QL STRIP: ABNORMAL
LEUKOCYTE ESTERASE UR QL STRIP: ABNORMAL
NITRITE UR QL STRIP: NEGATIVE
PH UR STRIP: 6 [PH] (ref 5–8)
PROT UR QL STRIP: NEGATIVE
RBC #/AREA URNS HPF: ABNORMAL /HPF
SERVICE CMNT-IMP: ABNORMAL
SP GR UR STRIP: 1.01 (ref 1–1.03)
SQUAMOUS #/AREA URNS HPF: ABNORMAL /HPF
WBC #/AREA URNS HPF: ABNORMAL /HPF

## 2025-08-13 PROCEDURE — 1160F RVW MEDS BY RX/DR IN RCRD: CPT | Performed by: NURSE PRACTITIONER

## 2025-08-13 PROCEDURE — 99213 OFFICE O/P EST LOW 20 MIN: CPT | Performed by: NURSE PRACTITIONER

## 2025-08-13 PROCEDURE — 1159F MED LIST DOCD IN RCRD: CPT | Performed by: NURSE PRACTITIONER

## 2025-08-13 RX ORDER — CEPHALEXIN 500 MG/1
500 CAPSULE ORAL 2 TIMES DAILY
Qty: 14 CAPSULE | Refills: 0 | Status: SHIPPED | OUTPATIENT
Start: 2025-08-13 | End: 2025-08-14 | Stop reason: ALTCHOICE

## 2025-08-14 ENCOUNTER — APPOINTMENT (OUTPATIENT)
Dept: PHYSICAL THERAPY | Facility: CLINIC | Age: 75
End: 2025-08-14
Payer: MEDICARE

## 2025-08-14 DIAGNOSIS — N39.0 URINARY TRACT INFECTION WITHOUT HEMATURIA, SITE UNSPECIFIED: Primary | ICD-10-CM

## 2025-08-14 RX ORDER — AMOXICILLIN AND CLAVULANATE POTASSIUM 400; 57 MG/5ML; MG/5ML
500 POWDER, FOR SUSPENSION ORAL 2 TIMES DAILY
Qty: 88.2 ML | Refills: 0 | Status: SHIPPED | OUTPATIENT
Start: 2025-08-14 | End: 2025-08-21

## 2025-08-27 ENCOUNTER — APPOINTMENT (OUTPATIENT)
Dept: PHYSICAL THERAPY | Facility: CLINIC | Age: 75
End: 2025-08-27
Payer: MEDICARE

## 2025-08-27 DIAGNOSIS — M25.551 RIGHT HIP PAIN: Primary | ICD-10-CM

## 2025-08-27 DIAGNOSIS — M62.81 MUSCLE WEAKNESS OF LOWER EXTREMITY: ICD-10-CM

## 2025-08-27 DIAGNOSIS — R26.9 ABNORMALITY OF GAIT: ICD-10-CM

## 2025-08-27 PROCEDURE — 97110 THERAPEUTIC EXERCISES: CPT | Mod: GP

## 2025-08-27 ASSESSMENT — PAIN - FUNCTIONAL ASSESSMENT: PAIN_FUNCTIONAL_ASSESSMENT: 0-10

## 2025-08-27 ASSESSMENT — PAIN SCALES - GENERAL: PAINLEVEL_OUTOF10: 0 - NO PAIN

## 2025-09-25 ENCOUNTER — APPOINTMENT (OUTPATIENT)
Dept: PHYSICAL THERAPY | Facility: CLINIC | Age: 75
End: 2025-09-25
Payer: MEDICARE

## 2025-09-25 DIAGNOSIS — M62.81 MUSCLE WEAKNESS OF LOWER EXTREMITY: ICD-10-CM

## 2025-09-25 DIAGNOSIS — M25.551 RIGHT HIP PAIN: Primary | ICD-10-CM

## 2025-09-25 DIAGNOSIS — R26.9 ABNORMALITY OF GAIT: ICD-10-CM

## 2025-09-29 ENCOUNTER — APPOINTMENT (OUTPATIENT)
Dept: PHYSICAL THERAPY | Facility: CLINIC | Age: 75
End: 2025-09-29
Payer: MEDICARE

## 2025-09-29 DIAGNOSIS — R26.9 ABNORMALITY OF GAIT: ICD-10-CM

## 2025-09-29 DIAGNOSIS — M25.551 RIGHT HIP PAIN: Primary | ICD-10-CM

## 2025-09-29 DIAGNOSIS — M62.81 MUSCLE WEAKNESS OF LOWER EXTREMITY: ICD-10-CM

## 2025-10-09 ENCOUNTER — APPOINTMENT (OUTPATIENT)
Dept: PHYSICAL THERAPY | Facility: CLINIC | Age: 75
End: 2025-10-09
Payer: MEDICARE

## 2025-10-09 DIAGNOSIS — R26.9 ABNORMALITY OF GAIT: ICD-10-CM

## 2025-10-09 DIAGNOSIS — M25.551 RIGHT HIP PAIN: Primary | ICD-10-CM

## 2025-10-09 DIAGNOSIS — M62.81 MUSCLE WEAKNESS OF LOWER EXTREMITY: ICD-10-CM

## (undated) DEVICE — PILLOW, ABDUCTION, MEDIUM

## (undated) DEVICE — RETRIEVER, SUTURE, HEWSON

## (undated) DEVICE — GLOVE, SURGICAL, PROTEXIS PI MICRO, 8.0, PF, LF

## (undated) DEVICE — WOUND SYSTEM, DEBRIDEMENT & CLEANING, O.R DUOPAK

## (undated) DEVICE — COVER HANDLE LIGHT, STERIS, BLUE, STERILE

## (undated) DEVICE — HIGH FLOW TIP FOR INTERPULSE HANDPIECE SET

## (undated) DEVICE — BLADE, SAW, RECIPROCATING, SHORT

## (undated) DEVICE — SUTURE, MONOCRYL, 4-0, 27 IN, PS-2, UNDYED

## (undated) DEVICE — GOWN, ASTOUND, XL

## (undated) DEVICE — ADHESIVE, SKIN, DERMABOND ADVANCED, 15CM, PEN-STYLE

## (undated) DEVICE — TOWEL PACK, STERILE, 4/PACK, BLUE

## (undated) DEVICE — SOLUTION, IRRIGATION, STERILE WATER, 1000 ML, POUR BOTTLE

## (undated) DEVICE — FEMORAL CANAL TIP FOR INTERPULSE HANDPIECE SET

## (undated) DEVICE — Device

## (undated) DEVICE — CEMENT MIX KIT, REVOLUTION, W/BREAKAWAY

## (undated) DEVICE — SUTURE, VICRYL, 1, 36 IN, CT-1, UNDYED

## (undated) DEVICE — SUTURE, MONOCRYL, 3-0, 36 IN, CT-1, UNDYED

## (undated) DEVICE — GLOVE, SURGICAL, PROTEXIS PI , 7.5, PF, LF

## (undated) DEVICE — BLADE, GEN COATED 2.75, LF

## (undated) DEVICE — BLADE, SAGITTAL, THIN, SHORT, LF

## (undated) DEVICE — SOLUTION, IRRIGATION, SODIUM CHLORIDE 0.9%, 1000 ML, POUR BOTTLE

## (undated) DEVICE — HOOD, STERISHIELD T4 SYSTEM

## (undated) DEVICE — COVER, PLASTIC, MAYO STAND, 29.5IN X 55.5IN

## (undated) DEVICE — PREP KIT, BONE, BIO-PREP

## (undated) DEVICE — DRESSING, MEPILEX BORDER, POST-OP AG, 4 X 10 IN

## (undated) DEVICE — INTERPULSE HANDPIECE SET W/ 10FT SUCTION TUBING

## (undated) DEVICE — NEEDLE, SPINAL, QUINCKE, 18 G X 3.5 IN, PINK HUB

## (undated) DEVICE — SUTURE, ETHIBOND, P2, V-37, 30 IN, GREEN